# Patient Record
Sex: FEMALE | Race: WHITE | NOT HISPANIC OR LATINO | Employment: OTHER | URBAN - METROPOLITAN AREA
[De-identification: names, ages, dates, MRNs, and addresses within clinical notes are randomized per-mention and may not be internally consistent; named-entity substitution may affect disease eponyms.]

---

## 2017-01-19 PROBLEM — R06.02 SHORTNESS OF BREATH: Status: ACTIVE | Noted: 2017-01-19

## 2017-01-19 PROBLEM — Z85.3 HISTORY OF BREAST CANCER: Status: ACTIVE | Noted: 2017-01-19

## 2017-01-19 PROBLEM — I10 HYPERTENSION: Status: ACTIVE | Noted: 2017-01-19

## 2017-01-29 PROBLEM — I50.32 HEART FAILURE, DIASTOLIC, CHRONIC: Status: ACTIVE | Noted: 2017-01-29

## 2017-02-01 ENCOUNTER — HOSPITAL ENCOUNTER (OUTPATIENT)
Dept: RADIOLOGY | Facility: OTHER | Age: 62
Discharge: HOME | End: 2017-02-01
Attending: INTERNAL MEDICINE
Payer: COMMERCIAL

## 2017-02-01 DIAGNOSIS — I50.32 HEART FAILURE, DIASTOLIC, CHRONIC: ICD-10-CM

## 2017-02-01 PROCEDURE — 71020 XR CHEST PA AND LATERAL: CPT | Mod: 26,,, | Performed by: RADIOLOGY

## 2017-02-01 PROCEDURE — 71020 XR CHEST PA AND LATERAL: CPT | Mod: TC

## 2017-06-13 PROBLEM — E66.3 OVERWEIGHT: Status: ACTIVE | Noted: 2017-06-13

## 2017-11-30 ENCOUNTER — HOSPITAL ENCOUNTER (EMERGENCY)
Facility: OTHER | Age: 62
Discharge: HOME | End: 2017-11-30
Attending: EMERGENCY MEDICINE
Payer: COMMERCIAL

## 2017-11-30 VITALS
RESPIRATION RATE: 23 BRPM | HEART RATE: 58 BPM | TEMPERATURE: 94 F | WEIGHT: 145 LBS | SYSTOLIC BLOOD PRESSURE: 149 MMHG | HEIGHT: 62 IN | BODY MASS INDEX: 26.68 KG/M2 | OXYGEN SATURATION: 100 % | DIASTOLIC BLOOD PRESSURE: 74 MMHG

## 2017-11-30 DIAGNOSIS — I47.10 SVT (SUPRAVENTRICULAR TACHYCARDIA): Primary | ICD-10-CM

## 2017-11-30 DIAGNOSIS — I10 ESSENTIAL HYPERTENSION: ICD-10-CM

## 2017-11-30 LAB
ALBUMIN SERPL BCP-MCNC: 3.9 G/DL
ALP SERPL-CCNC: 102 U/L
ALT SERPL W/O P-5'-P-CCNC: 29 U/L
ANION GAP SERPL CALC-SCNC: 11 MMOL/L
AST SERPL-CCNC: 27 U/L
BASOPHILS # BLD AUTO: 0.03 K/UL
BASOPHILS NFR BLD: 0.2 %
BILIRUB SERPL-MCNC: 1 MG/DL
BNP SERPL-MCNC: 220 PG/ML
BUN SERPL-MCNC: 16 MG/DL
CALCIUM SERPL-MCNC: 9.9 MG/DL
CHLORIDE SERPL-SCNC: 105 MMOL/L
CO2 SERPL-SCNC: 26 MMOL/L
CREAT SERPL-MCNC: 0.7 MG/DL
DIFFERENTIAL METHOD: ABNORMAL
EOSINOPHIL # BLD AUTO: 0.1 K/UL
EOSINOPHIL NFR BLD: 1 %
ERYTHROCYTE [DISTWIDTH] IN BLOOD BY AUTOMATED COUNT: 13.3 %
EST. GFR  (AFRICAN AMERICAN): >60 ML/MIN/1.73 M^2
EST. GFR  (NON AFRICAN AMERICAN): >60 ML/MIN/1.73 M^2
GLUCOSE SERPL-MCNC: 107 MG/DL
HCT VFR BLD AUTO: 47.8 %
HGB BLD-MCNC: 16.3 G/DL
LYMPHOCYTES # BLD AUTO: 3.6 K/UL
LYMPHOCYTES NFR BLD: 28.4 %
MAGNESIUM SERPL-MCNC: 2 MG/DL
MCH RBC QN AUTO: 34.2 PG
MCHC RBC AUTO-ENTMCNC: 34.1 G/DL
MCV RBC AUTO: 100 FL
MONOCYTES # BLD AUTO: 1 K/UL
MONOCYTES NFR BLD: 7.6 %
NEUTROPHILS # BLD AUTO: 7.9 K/UL
NEUTROPHILS NFR BLD: 62.6 %
PLATELET # BLD AUTO: 288 K/UL
PMV BLD AUTO: 10.8 FL
POTASSIUM SERPL-SCNC: 3.8 MMOL/L
PROT SERPL-MCNC: 7.8 G/DL
RBC # BLD AUTO: 4.76 M/UL
SODIUM SERPL-SCNC: 142 MMOL/L
TSH SERPL DL<=0.005 MIU/L-ACNC: 2.1 UIU/ML
WBC # BLD AUTO: 12.69 K/UL

## 2017-11-30 PROCEDURE — 96374 THER/PROPH/DIAG INJ IV PUSH: CPT

## 2017-11-30 PROCEDURE — 85025 COMPLETE CBC W/AUTO DIFF WBC: CPT

## 2017-11-30 PROCEDURE — 83880 ASSAY OF NATRIURETIC PEPTIDE: CPT

## 2017-11-30 PROCEDURE — 84443 ASSAY THYROID STIM HORMONE: CPT

## 2017-11-30 PROCEDURE — 93005 ELECTROCARDIOGRAM TRACING: CPT

## 2017-11-30 PROCEDURE — 83735 ASSAY OF MAGNESIUM: CPT

## 2017-11-30 PROCEDURE — 80053 COMPREHEN METABOLIC PANEL: CPT

## 2017-11-30 PROCEDURE — 99284 EMERGENCY DEPT VISIT MOD MDM: CPT | Mod: 25

## 2017-11-30 PROCEDURE — 63600175 PHARM REV CODE 636 W HCPCS

## 2017-11-30 RX ORDER — ADENOSINE 3 MG/ML
INJECTION, SOLUTION INTRAVENOUS
Status: COMPLETED
Start: 2017-11-30 | End: 2017-11-30

## 2017-11-30 RX ORDER — ADENOSINE 3 MG/ML
6 INJECTION, SOLUTION INTRAVENOUS ONCE
Status: COMPLETED | OUTPATIENT
Start: 2017-11-30 | End: 2017-11-30

## 2017-11-30 RX ORDER — METOPROLOL SUCCINATE 25 MG/1
50 TABLET, EXTENDED RELEASE ORAL DAILY
Qty: 60 TABLET | Refills: 0 | Status: SHIPPED | OUTPATIENT
Start: 2017-11-30 | End: 2017-12-14 | Stop reason: SDUPTHER

## 2017-11-30 RX ADMIN — ADENOSINE 6 MG: 3 INJECTION, SOLUTION INTRAVENOUS at 11:11

## 2017-11-30 NOTE — ED NOTES
AAOx3. Pt has regular tachycardia @ rate of 165 on monitor, c/o sob , denies CP, respirations even and unlabored, lung sounds clear in all fields, no peripheral edema noted, hx of CHF. Pale complexion and diaphoretic upon arrival to ED. Abdomen is soft and non tender, ambulatory w/ steady gait

## 2017-11-30 NOTE — ED PROVIDER NOTES
"Encounter Date: 11/30/2017    SCRIBE #1 NOTE: I, Perry Griffin, am scribing for, and in the presence of, Dr. Aviles.       History     Chief Complaint   Patient presents with    Tachycardia     pt reports HR of 165 and feeling dizzy. + sob denies chest pain     10:53 AM  Patient is a 62 y.o. female with a history of CHF and breast cancer in remission who presents to the ED with complaint of sudden onset palpitations. She reports sudden onset of symptoms two hours ago while sitting at her desk working from home. She reports a "heart racing" sensation, which has been constant since onset. She reports associated shortness of breath and generalized weakness. She denies chest pain, dizziness, or leg swelling. She denies any unusual activity or caffeine intake prior to onset this morning, but does notes "I had a lot going on, I was busy." She reports experiencing a similar sensation in the past, but states "usually it doesn't last this long." She notes recent bronchitis, and states she has finished one week of antibiotic treatment,but denies use of breathing treatments. She reports compliance with metoprolol, losartan, and anastrozole. She reports past surgery for breast cancer, but denies any recent surgeries. She denies use of tobacco, alcohol, or illicit drugs.      The history is provided by the patient.     Review of patient's allergies indicates:  No Known Allergies  Past Medical History:   Diagnosis Date    Heart failure, diastolic, chronic 1/29/2017 1/23/2017: Echo: Normal left ventricular size and systolic function. Mild LVH. Moderate diastolic dysfunction. Severely dilated LA. Mild aortic valve sclerosis - 1.5 m/s. Mild AR.    History of breast cancer 1/19/2017 6/2015: Right lumpectomy. Radiation. No chemotherapy.    Shortness of breath 1/19/2017 7/2016: Began experience shortness of breath.     History reviewed. No pertinent surgical history.  History reviewed. No pertinent family " history.  Social History   Substance Use Topics    Smoking status: Never Smoker    Smokeless tobacco: Never Used    Alcohol use Not on file     Review of Systems   Constitutional: Negative for activity change, chills and fever.   HENT: Negative for congestion and sore throat.    Eyes: Negative for visual disturbance.   Respiratory: Positive for shortness of breath. Negative for cough.    Cardiovascular: Positive for palpitations. Negative for chest pain and leg swelling.   Gastrointestinal: Negative for abdominal pain, diarrhea and vomiting.   Genitourinary: Negative for decreased urine volume, dysuria and vaginal discharge.   Musculoskeletal: Negative for joint swelling, neck pain and neck stiffness.   Skin: Negative for rash and wound.   Neurological: Positive for weakness. Negative for numbness and headaches.   Psychiatric/Behavioral: Negative for confusion.       Physical Exam     Vitals:    11/30/17 1057 11/30/17 1106 11/30/17 1127 11/30/17 1158   BP: (!) 137/103 (!) 183/105 (!) 174/79 (!) 166/81   Pulse: (!) 166 (!) 160 78 66   Resp: (!) 26 (!) 24     Temp:       TempSrc:       SpO2: 100% 100% 100% 100%   Weight:       Height:        11/30/17 1228   BP: (!) 149/74   Pulse: (!) 58   Resp: (!) 23   Temp:    TempSrc:    SpO2: 100%   Weight:    Height:        Physical Exam    Nursing note and vitals reviewed.  Constitutional: She appears well-developed and well-nourished.   HENT:   Head: Normocephalic and atraumatic.   Eyes: Conjunctivae and EOM are normal. Pupils are equal, round, and reactive to light.   Neck: Normal range of motion. Neck supple.   Cardiovascular: Regular rhythm, normal heart sounds and intact distal pulses.   No murmur heard.  Tachycardic.   Pulmonary/Chest: Breath sounds normal. No respiratory distress.   Abdominal: Soft. Bowel sounds are normal. There is no tenderness.   Musculoskeletal: Normal range of motion. She exhibits no edema.   Neurological: She is alert and oriented to person,  place, and time. She has normal strength. No cranial nerve deficit or sensory deficit.   Skin: Skin is warm and dry. No rash noted.   Psychiatric: She has a normal mood and affect. Her behavior is normal.         ED Course   Critical Care  Date/Time: 11/30/2017 11:07 AM  Performed by: YASIR PAIGE  Authorized by: YASIR PAIGE   Direct patient critical care time: 16 minutes  Additional history critical care time: 3 minutes  Ordering / reviewing critical care time: 5 minutes  Documentation critical care time: 5 minutes  Consulting other physicians critical care time: 5 minutes  Total critical care time (exclusive of procedural time) : 34 minutes  Critical care time was exclusive of separately billable procedures and treating other patients.  Critical care was necessary to treat or prevent imminent or life-threatening deterioration of the following conditions: cardiac failure (SVT).  Critical care was time spent personally by me on the following activities: blood draw for specimens, development of treatment plan with patient or surrogate, discussions with consultants, interpretation of cardiac output measurements, evaluation of patient's response to treatment, examination of patient, obtaining history from patient or surrogate, ordering and performing treatments and interventions, ordering and review of laboratory studies, pulse oximetry, re-evaluation of patient's condition and review of old charts.        Labs Reviewed   CBC W/ AUTO DIFFERENTIAL - Abnormal; Notable for the following:        Result Value    Hemoglobin 16.3 (*)      (*)     MCH 34.2 (*)     Gran # 7.9 (*)     All other components within normal limits   B-TYPE NATRIURETIC PEPTIDE - Abnormal; Notable for the following:      (*)     All other components within normal limits   COMPREHENSIVE METABOLIC PANEL   TSH   MAGNESIUM     EKG Readings: (Independently Interpreted)   Initial EKG at 10:47 AM: SVT rate of 163 with ST depressions in  the inferior leads.  Repeat EKG at 11:07 AM: Normal sinus rhythm at a rate or 76. No STEMI. No abnormal T waves.          Medical Decision Making:   Independently Interpreted Test(s):   I have ordered and independently interpreted EKG Reading(s) - see prior notes  Clinical Tests:   Lab Tests: Ordered and Reviewed  Medical Tests: Ordered and Reviewed  ED Management:  11:07 AM - SVT resolved upon administration of adenosine. Patient is now in sinus rhythm at a rate of 70.  12:43 PM - Discussed with Dr. Alvarenga.  He states to increase metoprolol succinate to 50 mg daily and follow-up with him in a few weeks.    Emergent evaluation of 62-year-old female who presents with complaint of palpitations.  Vital signs reveal significant tachycardia.  Physical exam revealed tachycardia, no evidence of fluid overload or overt heart failure.  EKG shows SVT.  We attempted vagal maneuvers with a modified Valsalva without success.  She was then given 6 mg IV adenosine with conversion to normal sinus rhythm.  Patient's labs show mild elevation in BNP but no electrolyte disturbance or renal failure or profound anemia.  I discussed the case with her cardiologist and we will increase her metoprolol and she will follow-up with him.  She is discharged in improved condition and I'm comfortable with discharge home.            Scribe Attestation:   Scribe #1: I performed the above scribed service and the documentation accurately describes the services I performed. I attest to the accuracy of the note.    Attending Attestation:           Physician Attestation for Scribe:  Physician Attestation Statement for Scribe #1: I, Dr. Aviles, reviewed documentation, as scribed by Perry Griffin in my presence, and it is both accurate and complete.                 ED Course      Clinical Impression:     1. SVT (supraventricular tachycardia)    2. Essential hypertension                                 Katina Aviles MD  11/30/17 6540

## 2017-12-14 PROBLEM — I47.10 SUPRAVENTRICULAR TACHYCARDIA: Status: ACTIVE | Noted: 2017-12-14

## 2017-12-28 ENCOUNTER — CLINICAL SUPPORT (OUTPATIENT)
Dept: CARDIOLOGY | Facility: CLINIC | Age: 62
End: 2017-12-28
Payer: COMMERCIAL

## 2017-12-28 DIAGNOSIS — I47.10 SUPRAVENTRICULAR TACHYCARDIA: ICD-10-CM

## 2017-12-28 PROCEDURE — 93224 XTRNL ECG REC UP TO 48 HRS: CPT | Mod: S$GLB,,, | Performed by: INTERNAL MEDICINE

## 2018-02-06 ENCOUNTER — OFFICE VISIT (OUTPATIENT)
Dept: CARDIOLOGY | Facility: CLINIC | Age: 63
End: 2018-02-06
Attending: INTERNAL MEDICINE
Payer: COMMERCIAL

## 2018-02-06 VITALS
DIASTOLIC BLOOD PRESSURE: 62 MMHG | BODY MASS INDEX: 26.5 KG/M2 | SYSTOLIC BLOOD PRESSURE: 128 MMHG | WEIGHT: 144 LBS | HEIGHT: 62 IN | HEART RATE: 47 BPM

## 2018-02-06 DIAGNOSIS — I47.10 SUPRAVENTRICULAR TACHYCARDIA: ICD-10-CM

## 2018-02-06 DIAGNOSIS — I50.32 HEART FAILURE, DIASTOLIC, CHRONIC: ICD-10-CM

## 2018-02-06 DIAGNOSIS — I10 ESSENTIAL HYPERTENSION: ICD-10-CM

## 2018-02-06 DIAGNOSIS — E66.3 OVERWEIGHT: ICD-10-CM

## 2018-02-06 DIAGNOSIS — R06.02 SHORTNESS OF BREATH: ICD-10-CM

## 2018-02-06 DIAGNOSIS — Z85.3 HISTORY OF BREAST CANCER: ICD-10-CM

## 2018-02-06 PROCEDURE — 3008F BODY MASS INDEX DOCD: CPT | Mod: S$GLB,,, | Performed by: INTERNAL MEDICINE

## 2018-02-06 PROCEDURE — 99214 OFFICE O/P EST MOD 30 MIN: CPT | Mod: S$GLB,,, | Performed by: INTERNAL MEDICINE

## 2018-02-06 NOTE — PROGRESS NOTES
Subjective:     Stefany Dobbins is a 62 y.o. female with hypertension. She is overweight. In 7/2016 she began experience some exertional dyspnea. In about 2005 she was told she had a leaky valve. She was diagnosed with breast cancer in 6/2015 and had a lumpectomy followed by radiation. To evaluate her exertional dyspnea she had an Echocardiogram on 1/23/2017 which revealed normal left ventricular size and systolic function with mild LVH. There was moderate diastolic dysfunction with a severely dilated LA. In addition there was mild aortic valve sclerosis with a peak velocity of 1.5 m/s and mild AR. On 2/14/2017 she had a Stress ECG and was able to do 6:00 min without any CP or SOB. The ECG was negative. A CXR was normal on 2/1/2017 and a BNP level was 109. No exertional chest pain. It was felt likely that her moderate diastolic dysfunction was the cause for her exertional dyspnea. She has since begun losartan and is now breathing easier with exertion. She was on atenolol 25 mg Q24 but as that was on backorder it was changed to metoprolol 25 mg Q24. She presented with SVT on 11/30/2017 at a rate of 167 bpm. She converted to sinus with adenosine. Since then she has been taking metoprolol 25 mg Q12 that was later changed to 50 mg Q24. Occasional subtle palpitations. No weak spells. Feeling she is under a lot of stress at present. Feeling well overall.      Congestive Heart Failure   Presents for follow-up visit. Associated symptoms include palpitations and shortness of breath. Pertinent negatives include no abdominal pain, chest pain, chest pressure, claudication, edema, fatigue, muscle weakness, near-syncope, nocturia, paroxysmal nocturnal dyspnea or unexpected weight change. The symptoms have been stable.   Shortness of Breath   This is a chronic problem. The current episode started more than 1 month ago. The problem occurs every several days. The problem has been gradually improving. Pertinent negatives include no  abdominal pain, chest pain, claudication, coryza, ear pain, fever, headaches, hemoptysis, leg pain, leg swelling, neck pain, orthopnea, PND, rash, rhinorrhea, sore throat, sputum production, swollen glands, syncope, vomiting or wheezing. The treatment provided no relief.   Palpitations    This is a new problem. The current episode started more than 1 year ago. Associated symptoms include shortness of breath. Pertinent negatives include no anxiety, chest fullness, chest pain, coughing, dizziness, fever, irregular heartbeat, malaise/fatigue, nausea, near-syncope, numbness, syncope, vomiting or weakness.   Hypertension   This is a chronic problem. The current episode started more than 1 month ago. The problem is unchanged. The problem is controlled (usually 120-130/70-80 mmHg at home). Associated symptoms include palpitations and shortness of breath. Pertinent negatives include no anxiety, blurred vision, chest pain, headaches, malaise/fatigue, neck pain, orthopnea, peripheral edema, PND or sweats.   Hyperlipidemia   Associated symptoms include shortness of breath. Pertinent negatives include no chest pain, focal weakness, leg pain or myalgias.       Review of Systems   Constitution: Negative for chills, fatigue, fever, weakness, malaise/fatigue and unexpected weight change.   HENT: Negative for ear pain, nosebleeds, rhinorrhea and sore throat.    Eyes: Negative for blurred vision, double vision, vision loss in left eye and vision loss in right eye.   Cardiovascular: Positive for palpitations. Negative for chest pain, claudication, dyspnea on exertion, irregular heartbeat, leg swelling, near-syncope, orthopnea, paroxysmal nocturnal dyspnea and syncope.   Respiratory: Positive for shortness of breath. Negative for cough, hemoptysis, sputum production and wheezing.    Endocrine: Negative for cold intolerance and heat intolerance.   Hematologic/Lymphatic: Negative for bleeding problem. Does not bruise/bleed easily.  "  Skin: Negative for color change and rash.   Musculoskeletal: Negative for back pain, falls, muscle weakness, myalgias and neck pain.   Gastrointestinal: Negative for abdominal pain, heartburn, hematemesis, hematochezia, hemorrhoids, jaundice, melena, nausea and vomiting.   Genitourinary: Negative for dysuria, hematuria and nocturia.   Neurological: Negative for dizziness, focal weakness, headaches, light-headedness, loss of balance, numbness and vertigo.   Psychiatric/Behavioral: Negative for altered mental status, depression and memory loss. The patient is not nervous/anxious.    Allergic/Immunologic: Negative for hives and persistent infections.       Current Outpatient Prescriptions on File Prior to Visit   Medication Sig Dispense Refill    anastrozole (ARIMIDEX) 1 mg Tab TK 1 T PO QD  1    losartan (COZAAR) 100 MG tablet Take 1 tablet (100 mg total) by mouth once daily. 90 tablet 3    metoprolol succinate (TOPROL-XL) 25 MG 24 hr tablet Take 1 tablet (25 mg total) by mouth once daily. 90 tablet 3    ropinirole (REQUIP) 0.5 MG tablet TK 1 T PO QD HS  4     No current facility-administered medications on file prior to visit.        /62   Pulse (!) 47   Ht 5' 2" (1.575 m)   Wt 65.3 kg (144 lb)   BMI 26.34 kg/m²       Objective:     Physical Exam   Constitutional: She is oriented to person, place, and time. She appears well-developed and well-nourished.  Non-toxic appearance. No distress.   HENT:   Head: Normocephalic and atraumatic.   Nose: Nose normal.   Eyes: Right eye exhibits no discharge. Left eye exhibits no discharge. Right conjunctiva is not injected. Left conjunctiva is not injected. Right pupil is round. Left pupil is round. Pupils are equal.   Neck: Neck supple. No JVD present. Carotid bruit is not present. No thyromegaly present.   Cardiovascular: Regular rhythm, S1 normal and S2 normal.   No extrasystoles are present. Bradycardia present.  PMI is not displaced.  Exam reveals gallop and " S4. Exam reveals no S3.    Murmur heard.   Midsystolic murmur is present  at the upper right sternal border  High-pitched blowing decrescendo early diastolic murmur is present with a grade of 1/6  at the upper right sternal border radiating to the apex  Pulses:       Radial pulses are 2+ on the right side, and 2+ on the left side.        Femoral pulses are 2+ on the right side, and 2+ on the left side.       Dorsalis pedis pulses are 2+ on the right side, and 2+ on the left side.        Posterior tibial pulses are 2+ on the right side, and 2+ on the left side.   Pulmonary/Chest: Effort normal and breath sounds normal.   Abdominal: Soft. Normal appearance. There is no hepatosplenomegaly. There is no tenderness.   Musculoskeletal:        Right ankle: She exhibits no swelling, no ecchymosis and no deformity.        Left ankle: She exhibits no swelling, no ecchymosis and no deformity.   Lymphadenopathy:        Head (right side): No submandibular adenopathy present.        Head (left side): No submandibular adenopathy present.     She has no cervical adenopathy.   Neurological: She is alert and oriented to person, place, and time. She is not disoriented. No cranial nerve deficit or sensory deficit.   Skin: Skin is warm, dry and intact. No rash noted. She is not diaphoretic. No cyanosis. Nails show no clubbing.   Psychiatric: She has a normal mood and affect. Her speech is normal and behavior is normal. Judgment and thought content normal. Cognition and memory are normal.       Assessment:     1. Heart failure, diastolic, chronic    2. Shortness of breath    3. Supraventricular tachycardia    4. Essential hypertension    5. Overweight    6. History of breast cancer        Plan:     1. Heart Failure, Diastolic, Chronic   7/2016: Began experience shortness of breath.   1/23/2017: Echo: Normal left ventricular size and systolic function. Mild LVH. Moderate diastolic dysfunction. Severely dilated LA. Mild aortic valve  sclerosis - 1.5 m/s. Mild AR.   2017: Stress EC:00 min. No CP or SOB. ECG negative.    2017: CXR: Normal.    2017: .   Appears her moderate diastolic dysfunction is cause for her SOB.   On metoprolol 50 mg Q24 and losartan 100 mg Q24.   Less exertional dyspnea now.       2. Shortness of Breath   2016: Began experience shortness of breath.   2016: Tells me blood tests fine.   As above.    3. Supraventricular Tachycardia   2017:  bpm.   2017: SB 46 bpm.   Reduce metoprolol to 25 mg Q24.   2017: Holter: Sinus rhythm 60 (48-80) bpm. One 10 sec run of SVT at 150 bpm.   On metoprolol 25 mg Q24.    4. Hypertension   : Diagnosed.   On metoprolol 50 mg Q24 and losartan 100 mg Q24.   Keeping log at home.   Well controlled.    5. Overweight   2017: Weight 65 kg. BMI 26.   Encouraged to lose weight.     6. History of Breast Cancer   2015: Right lumpectomy. Radiation. No chemotherapy.     7. Primary Care   Dr. Andrés Paredes.    F/u 4 month.    Robin Rae M.D.

## 2018-06-12 ENCOUNTER — OFFICE VISIT (OUTPATIENT)
Dept: CARDIOLOGY | Facility: CLINIC | Age: 63
End: 2018-06-12
Attending: INTERNAL MEDICINE
Payer: COMMERCIAL

## 2018-06-12 VITALS
HEART RATE: 59 BPM | DIASTOLIC BLOOD PRESSURE: 62 MMHG | HEIGHT: 62 IN | BODY MASS INDEX: 26.5 KG/M2 | WEIGHT: 144 LBS | SYSTOLIC BLOOD PRESSURE: 121 MMHG

## 2018-06-12 DIAGNOSIS — R06.02 SHORTNESS OF BREATH: ICD-10-CM

## 2018-06-12 DIAGNOSIS — Z85.3 HISTORY OF BREAST CANCER: ICD-10-CM

## 2018-06-12 DIAGNOSIS — I47.10 SUPRAVENTRICULAR TACHYCARDIA: ICD-10-CM

## 2018-06-12 DIAGNOSIS — I10 ESSENTIAL HYPERTENSION: ICD-10-CM

## 2018-06-12 DIAGNOSIS — E66.3 OVERWEIGHT: ICD-10-CM

## 2018-06-12 DIAGNOSIS — I50.32 HEART FAILURE, DIASTOLIC, CHRONIC: ICD-10-CM

## 2018-06-12 PROCEDURE — 99214 OFFICE O/P EST MOD 30 MIN: CPT | Mod: S$GLB,,, | Performed by: INTERNAL MEDICINE

## 2018-06-12 PROCEDURE — 3078F DIAST BP <80 MM HG: CPT | Mod: CPTII,S$GLB,, | Performed by: INTERNAL MEDICINE

## 2018-06-12 PROCEDURE — 3074F SYST BP LT 130 MM HG: CPT | Mod: CPTII,S$GLB,, | Performed by: INTERNAL MEDICINE

## 2018-06-12 PROCEDURE — 3008F BODY MASS INDEX DOCD: CPT | Mod: CPTII,S$GLB,, | Performed by: INTERNAL MEDICINE

## 2018-06-12 RX ORDER — LOSARTAN POTASSIUM 100 MG/1
100 TABLET ORAL DAILY
Qty: 90 TABLET | Refills: 3 | Status: SHIPPED | OUTPATIENT
Start: 2018-06-12 | End: 2019-06-06 | Stop reason: SDUPTHER

## 2018-06-12 RX ORDER — CYCLOBENZAPRINE HCL 5 MG
5 TABLET ORAL 3 TIMES DAILY PRN
COMMUNITY
End: 2021-08-24

## 2018-06-12 RX ORDER — AZELASTINE 1 MG/ML
1 SPRAY, METERED NASAL 2 TIMES DAILY
COMMUNITY
End: 2021-08-24

## 2018-06-12 RX ORDER — METOPROLOL SUCCINATE 25 MG/1
25 TABLET, EXTENDED RELEASE ORAL DAILY
Qty: 90 TABLET | Refills: 3 | Status: SHIPPED | OUTPATIENT
Start: 2018-06-12 | End: 2018-12-11

## 2018-06-12 NOTE — PROGRESS NOTES
Subjective:     Stefany Dobbins is a 62 y.o. female with hypertension. She is overweight. In 7/2016 she began experience some exertional dyspnea. In about 2005 she was told she had a leaky valve. She was diagnosed with breast cancer in 6/2015 and had a lumpectomy followed by radiation. To evaluate her exertional dyspnea she had an Echocardiogram on 1/23/2017 which revealed normal left ventricular size and systolic function with mild LVH. There was moderate diastolic dysfunction with a severely dilated LA. In addition there was mild aortic valve sclerosis with a peak velocity of 1.5 m/s and mild AR. On 2/14/2017 she had a Stress ECG and was able to do 6:00 min without any CP or SOB. The ECG was negative. A CXR was normal on 2/1/2017 and a BNP level was 109. No exertional chest pain. It was felt likely that her moderate diastolic dysfunction was the cause for her exertional dyspnea. She has since begun losartan and is since breathing easier with exertion. She was on atenolol 25 mg Q24 but as that was on backorder it was changed to metoprolol 25 mg Q24. She presented with SVT on 11/30/2017 at a rate of 167 bpm. She converted to sinus with adenosine. Since then she has been taking metoprolol 25 mg Q12 that was later changed to 50 mg Q24 and then later reduced back to 25 mg Q24. Occasional subtle palpitations. No weak spells. Feeling she is under a lot of stress. No exertional chest pain. Feeling well overall.      Congestive Heart Failure   Presents for follow-up visit. Associated symptoms include palpitations and shortness of breath. Pertinent negatives include no abdominal pain, chest pain, chest pressure, claudication, edema, fatigue, muscle weakness, near-syncope, nocturia, paroxysmal nocturnal dyspnea or unexpected weight change. The symptoms have been stable.   Shortness of Breath   This is a chronic problem. The current episode started more than 1 year ago. The problem occurs every several days. The problem has  been gradually improving. Pertinent negatives include no abdominal pain, chest pain, claudication, coryza, ear pain, fever, headaches, hemoptysis, leg pain, leg swelling, neck pain, orthopnea, PND, rash, rhinorrhea, sore throat, sputum production, swollen glands, syncope, vomiting or wheezing. The treatment provided no relief.   Palpitations    This is a chronic problem. The current episode started more than 1 year ago. Associated symptoms include shortness of breath. Pertinent negatives include no anxiety, chest fullness, chest pain, coughing, diaphoresis, dizziness, fever, irregular heartbeat, malaise/fatigue, nausea, near-syncope, numbness, syncope, vomiting or weakness.   Hypertension   This is a chronic problem. The current episode started more than 1 year ago. The problem is unchanged. The problem is controlled (usually 120-130/70-80 mmHg at home). Associated symptoms include palpitations and shortness of breath. Pertinent negatives include no anxiety, blurred vision, chest pain, headaches, malaise/fatigue, neck pain, orthopnea, peripheral edema, PND or sweats. There is no history of chronic renal disease.   Hyperlipidemia   She has no history of chronic renal disease, diabetes, hypothyroidism or obesity. Associated symptoms include shortness of breath. Pertinent negatives include no chest pain, focal sensory loss, focal weakness, leg pain or myalgias.       Review of Systems   Constitution: Negative for chills, diaphoresis, fatigue, fever, weakness, malaise/fatigue and unexpected weight change.   HENT: Negative for ear pain, nosebleeds, rhinorrhea and sore throat.    Eyes: Negative for blurred vision, double vision, vision loss in left eye and vision loss in right eye.   Cardiovascular: Positive for palpitations. Negative for chest pain, claudication, dyspnea on exertion, irregular heartbeat, leg swelling, near-syncope, orthopnea, paroxysmal nocturnal dyspnea and syncope.   Respiratory: Positive for shortness  "of breath. Negative for cough, hemoptysis, sputum production and wheezing.    Endocrine: Negative for cold intolerance and heat intolerance.   Hematologic/Lymphatic: Negative for bleeding problem. Does not bruise/bleed easily.   Skin: Negative for color change and rash.   Musculoskeletal: Negative for back pain, falls, muscle weakness, myalgias and neck pain.   Gastrointestinal: Negative for abdominal pain, heartburn, hematemesis, hematochezia, hemorrhoids, jaundice, melena, nausea and vomiting.   Genitourinary: Negative for dysuria, hematuria and nocturia.   Neurological: Negative for dizziness, focal weakness, headaches, light-headedness, loss of balance, numbness and vertigo.   Psychiatric/Behavioral: Negative for altered mental status, depression and memory loss. The patient is not nervous/anxious.    Allergic/Immunologic: Negative for hives and persistent infections.       Current Outpatient Prescriptions on File Prior to Visit   Medication Sig Dispense Refill    anastrozole (ARIMIDEX) 1 mg Tab TK 1 T PO QD  1    losartan (COZAAR) 100 MG tablet Take 1 tablet (100 mg total) by mouth once daily. 90 tablet 3    metoprolol succinate (TOPROL-XL) 25 MG 24 hr tablet Take 1 tablet (25 mg total) by mouth once daily. 90 tablet 3    ropinirole (REQUIP) 0.5 MG tablet TK 1 T PO QD HS  4     No current facility-administered medications on file prior to visit.        /62   Pulse (!) 59   Ht 5' 2" (1.575 m)   Wt 65.3 kg (144 lb)   BMI 26.34 kg/m²       Objective:     Physical Exam   Constitutional: She is oriented to person, place, and time. She appears well-developed and well-nourished.  Non-toxic appearance. No distress.   HENT:   Head: Normocephalic and atraumatic.   Nose: Nose normal.   Eyes: Right eye exhibits no discharge. Left eye exhibits no discharge. Right conjunctiva is not injected. Left conjunctiva is not injected. Right pupil is round. Left pupil is round. Pupils are equal.   Neck: Neck supple. No " JVD present. Carotid bruit is not present. No thyromegaly present.   Scar from thyroidectomy.   Cardiovascular: Regular rhythm, S1 normal and S2 normal.   No extrasystoles are present. Bradycardia present.  PMI is not displaced.  Exam reveals gallop and S4. Exam reveals no S3.    Murmur heard.   Midsystolic murmur is present  at the upper right sternal border  High-pitched blowing decrescendo early diastolic murmur is present with a grade of 1/6  at the upper right sternal border radiating to the apex  Pulses:       Radial pulses are 2+ on the right side, and 2+ on the left side.        Femoral pulses are 2+ on the right side, and 2+ on the left side.       Dorsalis pedis pulses are 2+ on the right side, and 2+ on the left side.        Posterior tibial pulses are 2+ on the right side, and 2+ on the left side.   Pulmonary/Chest: Effort normal and breath sounds normal.   Abdominal: Soft. Normal appearance. There is no hepatosplenomegaly. There is no tenderness.   Musculoskeletal:        Right ankle: She exhibits no swelling, no ecchymosis and no deformity.        Left ankle: She exhibits no swelling, no ecchymosis and no deformity.   Lymphadenopathy:        Head (right side): No submandibular adenopathy present.        Head (left side): No submandibular adenopathy present.     She has no cervical adenopathy.   Neurological: She is alert and oriented to person, place, and time. She is not disoriented. No cranial nerve deficit or sensory deficit.   Skin: Skin is warm, dry and intact. No rash noted. She is not diaphoretic. Nails show no clubbing.   Psychiatric: She has a normal mood and affect. Her speech is normal and behavior is normal. Judgment and thought content normal. Cognition and memory are normal.       Assessment:     1. Heart failure, diastolic, chronic    2. Shortness of breath    3. Supraventricular tachycardia    4. Essential hypertension    5. Overweight    6. History of breast cancer        Plan:     1.  Heart Failure, Diastolic, Chronic   2016: Began experience shortness of breath.   2017: Echo: Normal left ventricular size and systolic function. Mild LVH. Moderate diastolic dysfunction. Severely dilated LA. Mild aortic valve sclerosis - 1.5 m/s. Mild AR.   2017: Stress EC:00 min. No CP or SOB. ECG negative.    2017: CXR: Normal.    2017: .   Appears her moderate diastolic dysfunction is cause for her SOB.   On metoprolol 25 mg Q24 and losartan 100 mg Q24.   Less exertional dyspnea.       2. Shortness of Breath   2016: Began experience shortness of breath.   2016: Told me blood tests fine.   As above.    3. Supraventricular Tachycardia   2017:  bpm.   2017: SB 46 bpm.   Reduce metoprolol to 25 mg Q24.   2017: Holter: Sinus rhythm 60 (48-80) bpm. One 10 sec run of SVT at 150 bpm.   On metoprolol 25 mg Q24.   No clinical recurrence.    4. Hypertension   : Diagnosed.   On metoprolol 25 mg Q24 and losartan 100 mg Q24.   Keeping log at home.   Well controlled.    5. Overweight   2017: Weight 65 kg. BMI 26.   Encouraged to lose weight.     6. History of Breast Cancer   2015: Right lumpectomy. Radiation. No chemotherapy.    7. History of Partial Thyroidectomy   : Partial thyroidectomy.     8 [facility]   Dr. Andrés Paredes.    F/u 6 months.    Robin Rae M.D.

## 2018-12-11 ENCOUNTER — OFFICE VISIT (OUTPATIENT)
Dept: CARDIOLOGY | Facility: CLINIC | Age: 63
End: 2018-12-11
Attending: INTERNAL MEDICINE
Payer: COMMERCIAL

## 2018-12-11 VITALS
BODY MASS INDEX: 26.5 KG/M2 | SYSTOLIC BLOOD PRESSURE: 131 MMHG | DIASTOLIC BLOOD PRESSURE: 78 MMHG | HEIGHT: 62 IN | HEART RATE: 55 BPM | WEIGHT: 144 LBS

## 2018-12-11 DIAGNOSIS — E66.3 OVERWEIGHT: ICD-10-CM

## 2018-12-11 DIAGNOSIS — R06.02 SHORTNESS OF BREATH: ICD-10-CM

## 2018-12-11 DIAGNOSIS — I47.10 SUPRAVENTRICULAR TACHYCARDIA: ICD-10-CM

## 2018-12-11 DIAGNOSIS — Z85.3 HISTORY OF BREAST CANCER: ICD-10-CM

## 2018-12-11 DIAGNOSIS — I50.32 HEART FAILURE, DIASTOLIC, CHRONIC: ICD-10-CM

## 2018-12-11 DIAGNOSIS — I10 ESSENTIAL HYPERTENSION: ICD-10-CM

## 2018-12-11 PROCEDURE — 3075F SYST BP GE 130 - 139MM HG: CPT | Mod: CPTII,S$GLB,, | Performed by: INTERNAL MEDICINE

## 2018-12-11 PROCEDURE — 3008F BODY MASS INDEX DOCD: CPT | Mod: CPTII,S$GLB,, | Performed by: INTERNAL MEDICINE

## 2018-12-11 PROCEDURE — 99214 OFFICE O/P EST MOD 30 MIN: CPT | Mod: S$GLB,,, | Performed by: INTERNAL MEDICINE

## 2018-12-11 PROCEDURE — 3078F DIAST BP <80 MM HG: CPT | Mod: CPTII,S$GLB,, | Performed by: INTERNAL MEDICINE

## 2018-12-11 RX ORDER — DILTIAZEM HYDROCHLORIDE 180 MG/1
180 CAPSULE, EXTENDED RELEASE ORAL DAILY
Qty: 30 CAPSULE | Refills: 11 | Status: SHIPPED | OUTPATIENT
Start: 2018-12-11 | End: 2019-09-26 | Stop reason: SDUPTHER

## 2018-12-11 NOTE — PROGRESS NOTES
Subjective:     Stefany Dobbins is a 63 y.o. female with hypertension. She is overweight. In 7/2016 she began experience some exertional dyspnea. In about 2005 she was told she had a leaky valve. She was diagnosed with breast cancer in 6/2015 and had a lumpectomy followed by radiation. To evaluate her exertional dyspnea she had an Echocardiogram on 1/23/2017 which revealed normal left ventricular size and systolic function with mild LVH. There was moderate diastolic dysfunction with a severely dilated LA. In addition there was mild aortic valve sclerosis with a peak velocity of 1.5 m/s and mild AR. On 2/14/2017 she had a Stress ECG and was able to do 6:00 min without any CP or SOB. The ECG was negative. A CXR was normal on 2/1/2017 and a BNP level was 109. No exertional chest pain. It was felt likely that her moderate diastolic dysfunction was the cause for her exertional dyspnea. She has since begun losartan and is since breathing easier with exertion. She was on atenolol 25 mg Q24 but as that was on backorder it was changed to metoprolol 25 mg Q24. She presented with SVT on 11/30/2017 at a rate of 167 bpm. She converted to sinus with adenosine. Since then she has been taking metoprolol 25 mg Q12 that was later changed to 50 mg Q24 and then later reduced back to 25 mg Q24. She had occasional subtle palpitations for years but in 2018 she has had two spells of palpitations that lasted for a few hours in in 7/2018 and one hour in 11/2018. In 7/2018 she was in an argument with her daughter but in 11/2018 she was resting when it occurred. No weak spells. Feeling she is under a lot of stress. No exertional chest pain. Feeling well overall.      Congestive Heart Failure   Presents for follow-up visit. Associated symptoms include palpitations and shortness of breath. Pertinent negatives include no abdominal pain, chest pain, chest pressure, claudication, edema, fatigue, muscle weakness, near-syncope, nocturia, paroxysmal  nocturnal dyspnea or unexpected weight change. The symptoms have been stable.   Shortness of Breath   This is a chronic problem. The current episode started more than 1 year ago. The problem occurs every several days. The problem has been gradually improving. Pertinent negatives include no abdominal pain, chest pain, claudication, coryza, ear pain, fever, headaches, hemoptysis, leg pain, leg swelling, neck pain, orthopnea, PND, rash, rhinorrhea, sore throat, sputum production, swollen glands, syncope, vomiting or wheezing. The treatment provided no relief.   Palpitations    This is a chronic problem. The current episode started more than 1 year ago. Associated symptoms include shortness of breath. Pertinent negatives include no anxiety, chest fullness, chest pain, coughing, diaphoresis, dizziness, fever, irregular heartbeat, malaise/fatigue, nausea, near-syncope, numbness, syncope, vomiting or weakness.   Hypertension   This is a chronic problem. The current episode started more than 1 year ago. The problem is unchanged. The problem is controlled (usually 120-130/70-80 mmHg at home). Associated symptoms include palpitations and shortness of breath. Pertinent negatives include no anxiety, blurred vision, chest pain, headaches, malaise/fatigue, neck pain, orthopnea, peripheral edema, PND or sweats. There is no history of chronic renal disease.   Hyperlipidemia   She has no history of chronic renal disease, diabetes, hypothyroidism or obesity. Associated symptoms include shortness of breath. Pertinent negatives include no chest pain, focal sensory loss, focal weakness, leg pain or myalgias.       Review of Systems   Constitution: Negative for chills, diaphoresis, fatigue, fever, weakness, malaise/fatigue and unexpected weight change.   HENT: Negative for ear pain, nosebleeds, rhinorrhea and sore throat.    Eyes: Negative for blurred vision, double vision, vision loss in left eye and vision loss in right eye.  "  Cardiovascular: Positive for palpitations. Negative for chest pain, claudication, dyspnea on exertion, irregular heartbeat, leg swelling, near-syncope, orthopnea, paroxysmal nocturnal dyspnea and syncope.   Respiratory: Positive for shortness of breath. Negative for cough, hemoptysis, sputum production and wheezing.    Endocrine: Negative for cold intolerance and heat intolerance.   Hematologic/Lymphatic: Negative for bleeding problem. Does not bruise/bleed easily.   Skin: Negative for color change and rash.   Musculoskeletal: Negative for back pain, falls, muscle weakness, myalgias and neck pain.   Gastrointestinal: Negative for abdominal pain, heartburn, hematemesis, hematochezia, hemorrhoids, jaundice, melena, nausea and vomiting.   Genitourinary: Negative for dysuria, hematuria and nocturia.   Neurological: Negative for dizziness, focal weakness, headaches, light-headedness, loss of balance, numbness and vertigo.   Psychiatric/Behavioral: Negative for altered mental status, depression and memory loss. The patient is not nervous/anxious.    Allergic/Immunologic: Negative for hives and persistent infections.       Current Outpatient Medications on File Prior to Visit   Medication Sig Dispense Refill    anastrozole (ARIMIDEX) 1 mg Tab TK 1 T PO QD  1    azelastine (ASTELIN) 137 mcg (0.1 %) nasal spray 1 spray by Nasal route 2 (two) times daily.      cyclobenzaprine (FLEXERIL) 5 MG tablet Take 5 mg by mouth 3 (three) times daily as needed for Muscle spasms.      losartan (COZAAR) 100 MG tablet Take 1 tablet (100 mg total) by mouth once daily. 90 tablet 3    metoprolol succinate (TOPROL-XL) 25 MG 24 hr tablet Take 1 tablet (25 mg total) by mouth once daily. 90 tablet 3    ropinirole (REQUIP) 0.5 MG tablet TK 1 T PO QD HS  4     No current facility-administered medications on file prior to visit.        /78   Pulse (!) 55   Ht 5' 2" (1.575 m)   Wt 65.3 kg (144 lb)   BMI 26.34 kg/m²       Objective:   "   Physical Exam   Constitutional: She is oriented to person, place, and time. She appears well-developed and well-nourished.  Non-toxic appearance. No distress.   HENT:   Head: Normocephalic and atraumatic.   Nose: Nose normal.   Eyes: Right eye exhibits no discharge. Left eye exhibits no discharge. Right conjunctiva is not injected. Left conjunctiva is not injected. Right pupil is round. Left pupil is round. Pupils are equal.   Neck: Neck supple. No JVD present. Carotid bruit is not present. No thyromegaly present.   Scar from thyroidectomy.   Cardiovascular: Regular rhythm, S1 normal and S2 normal.  No extrasystoles are present. Bradycardia present. PMI is not displaced. Exam reveals gallop and S4. Exam reveals no S3.   Murmur heard.   Midsystolic murmur is present at the upper right sternal border.  High-pitched blowing decrescendo early diastolic murmur is present with a grade of 1/6 at the upper right sternal border radiating to the apex.  Pulses:       Radial pulses are 2+ on the right side, and 2+ on the left side.        Femoral pulses are 2+ on the right side, and 2+ on the left side.       Dorsalis pedis pulses are 2+ on the right side, and 2+ on the left side.        Posterior tibial pulses are 2+ on the right side, and 2+ on the left side.   Pulmonary/Chest: Effort normal and breath sounds normal.   Abdominal: Soft. Normal appearance. There is no hepatosplenomegaly. There is no tenderness.   Musculoskeletal:        Right ankle: She exhibits no swelling, no ecchymosis and no deformity.        Left ankle: She exhibits no swelling, no ecchymosis and no deformity.   Lymphadenopathy:        Head (right side): No submandibular adenopathy present.        Head (left side): No submandibular adenopathy present.     She has no cervical adenopathy.   Neurological: She is alert and oriented to person, place, and time. She is not disoriented. No cranial nerve deficit or sensory deficit.   Skin: Skin is warm, dry and  intact. No rash noted. She is not diaphoretic. Nails show no clubbing.   Psychiatric: She has a normal mood and affect. Her speech is normal and behavior is normal. Judgment and thought content normal. Cognition and memory are normal.       Assessment:     1. Heart failure, diastolic, chronic    2. Shortness of breath    3. Supraventricular tachycardia    4. Essential hypertension    5. Overweight    6. History of breast cancer        Plan:     1. Heart Failure, Diastolic, Chronic   2016: Began experience shortness of breath.   2017: Echo: Normal left ventricular size and systolic function. Mild LVH. Moderate diastolic dysfunction. Severely dilated LA. Mild aortic valve sclerosis - 1.5 m/s. Mild AR.   2017: Stress EC:00 min. No CP or SOB. ECG negative.    2017: CXR: Normal.    2017: .   Appears her moderate diastolic dysfunction is cause for her SOB.   On metoprolol 25 mg Q24 and losartan 100 mg Q24.   Less exertional dyspnea.       2. Shortness of Breath   2016: Began experience shortness of breath.   2016: Told me blood tests fine.   As above.    3. Supraventricular Tachycardia   2017:  bpm.   2017: SB 46 bpm.   Reduce metoprolol to 25 mg Q24.   2017: Holter: Sinus rhythm 60 (48-80) bpm. One 10 sec run of SVT at 150 bpm.   On metoprolol 25 mg Q24.   2018: Symptomatic spell for a few hours.   2018: Symptomatic spell for one hour.   2018: Change metoprolol to diltiazem 180 mg Q24. Discussed EPS which is very reasonable especially if recurrence.     4. Hypertension   : Diagnosed.   On metoprolol 25 mg Q24 and losartan 100 mg Q24.   Keeping log at home.   Well controlled.   As above.    5. Overweight   2017: Weight 65 kg. BMI 26.   Encouraged to lose weight.     6. History of Breast Cancer   2015: Right lumpectomy. Radiation. No chemotherapy.    7. History of Partial Thyroidectomy   1973: Partial thyroidectomy.     8 [facility]     Andrés Paredes.    F/u 2 months.    Robin Rae M.D.

## 2019-06-06 DIAGNOSIS — I10 ESSENTIAL HYPERTENSION: ICD-10-CM

## 2019-06-06 RX ORDER — LOSARTAN POTASSIUM 100 MG/1
TABLET ORAL
Qty: 90 TABLET | Refills: 0 | Status: SHIPPED | OUTPATIENT
Start: 2019-06-06 | End: 2019-09-26 | Stop reason: SDUPTHER

## 2019-09-26 DIAGNOSIS — I10 ESSENTIAL HYPERTENSION: ICD-10-CM

## 2019-09-26 DIAGNOSIS — I47.10 SUPRAVENTRICULAR TACHYCARDIA: ICD-10-CM

## 2019-09-26 RX ORDER — DILTIAZEM HYDROCHLORIDE 180 MG/1
180 CAPSULE, EXTENDED RELEASE ORAL DAILY
Qty: 90 CAPSULE | Refills: 3 | Status: SHIPPED | OUTPATIENT
Start: 2019-09-26 | End: 2019-10-01 | Stop reason: SDUPTHER

## 2019-09-26 RX ORDER — LOSARTAN POTASSIUM 100 MG/1
100 TABLET ORAL DAILY
Qty: 90 TABLET | Refills: 3 | Status: SHIPPED | OUTPATIENT
Start: 2019-09-26 | End: 2019-10-01 | Stop reason: SDUPTHER

## 2019-10-01 DIAGNOSIS — I10 ESSENTIAL HYPERTENSION: ICD-10-CM

## 2019-10-01 DIAGNOSIS — I47.10 SUPRAVENTRICULAR TACHYCARDIA: ICD-10-CM

## 2019-10-01 RX ORDER — LOSARTAN POTASSIUM 100 MG/1
100 TABLET ORAL DAILY
Qty: 90 TABLET | Refills: 3 | Status: SHIPPED | OUTPATIENT
Start: 2019-10-01 | End: 2020-09-02

## 2019-10-01 RX ORDER — DILTIAZEM HYDROCHLORIDE 180 MG/1
180 CAPSULE, EXTENDED RELEASE ORAL DAILY
Qty: 90 CAPSULE | Refills: 3 | Status: SHIPPED | OUTPATIENT
Start: 2019-10-01 | End: 2020-03-24 | Stop reason: SDUPTHER

## 2020-02-01 ENCOUNTER — OFFICE VISIT (OUTPATIENT)
Dept: URGENT CARE | Facility: CLINIC | Age: 65
End: 2020-02-01
Payer: MEDICARE

## 2020-02-01 VITALS
HEIGHT: 62 IN | DIASTOLIC BLOOD PRESSURE: 80 MMHG | OXYGEN SATURATION: 100 % | TEMPERATURE: 98 F | RESPIRATION RATE: 18 BRPM | SYSTOLIC BLOOD PRESSURE: 172 MMHG | BODY MASS INDEX: 25.76 KG/M2 | HEART RATE: 68 BPM | WEIGHT: 140 LBS

## 2020-02-01 DIAGNOSIS — R09.89 CHEST CONGESTION: ICD-10-CM

## 2020-02-01 DIAGNOSIS — J02.9 SORE THROAT: Primary | ICD-10-CM

## 2020-02-01 LAB
CTP QC/QA: YES
MOLECULAR STREP A: NEGATIVE

## 2020-02-01 PROCEDURE — 99203 PR OFFICE/OUTPT VISIT, NEW, LEVL III, 30-44 MIN: ICD-10-PCS | Mod: S$GLB,,, | Performed by: NURSE PRACTITIONER

## 2020-02-01 PROCEDURE — 71046 XR CHEST PA AND LATERAL: ICD-10-PCS | Mod: S$GLB,,, | Performed by: RADIOLOGY

## 2020-02-01 PROCEDURE — 87651 STREP A DNA AMP PROBE: CPT | Mod: QW,S$GLB,, | Performed by: NURSE PRACTITIONER

## 2020-02-01 PROCEDURE — 99203 OFFICE O/P NEW LOW 30 MIN: CPT | Mod: S$GLB,,, | Performed by: NURSE PRACTITIONER

## 2020-02-01 PROCEDURE — 71046 X-RAY EXAM CHEST 2 VIEWS: CPT | Mod: S$GLB,,, | Performed by: RADIOLOGY

## 2020-02-01 PROCEDURE — 87651 POCT STREP A MOLECULAR: ICD-10-PCS | Mod: QW,S$GLB,, | Performed by: NURSE PRACTITIONER

## 2020-02-01 RX ORDER — AMOXICILLIN AND CLAVULANATE POTASSIUM 875; 125 MG/1; MG/1
1 TABLET, FILM COATED ORAL 2 TIMES DAILY
Qty: 28 TABLET | Refills: 0 | Status: SHIPPED | OUTPATIENT
Start: 2020-02-01 | End: 2020-02-11

## 2020-02-01 RX ORDER — LEVOCETIRIZINE DIHYDROCHLORIDE 5 MG/1
5 TABLET, FILM COATED ORAL NIGHTLY
Qty: 30 TABLET | Refills: 0 | Status: SHIPPED | OUTPATIENT
Start: 2020-02-01 | End: 2021-08-24

## 2020-02-01 RX ORDER — BENZONATATE 100 MG/1
200 CAPSULE ORAL 3 TIMES DAILY PRN
Qty: 30 CAPSULE | Refills: 1 | Status: SHIPPED | OUTPATIENT
Start: 2020-02-01 | End: 2021-01-31

## 2020-02-01 RX ORDER — PRAVASTATIN SODIUM 40 MG/1
40 TABLET ORAL DAILY
COMMUNITY
Start: 2019-12-01

## 2020-02-01 NOTE — PROGRESS NOTES
Subjective:       Patient ID: Stefany Dobbins is a 64 y.o. female.    Vitals:  vitals were not taken for this visit.     Chief Complaint: URI    Pt's grand child recently dx with pnumonia     URI    This is a new problem. The current episode started today. The problem has been gradually worsening. There has been no fever. Associated symptoms include congestion, coughing and a sore throat. Pertinent negatives include no ear pain, nausea, rash, sinus pain, vomiting or wheezing. She has tried nothing for the symptoms.       Constitution: Positive for chills. Negative for sweating, fatigue and fever.   HENT: Positive for congestion and sore throat. Negative for ear pain, sinus pain, sinus pressure and voice change.    Neck: Negative for painful lymph nodes.   Eyes: Negative for eye redness.   Respiratory: Positive for chest tightness and cough. Negative for sputum production, bloody sputum, COPD, shortness of breath, stridor, wheezing and asthma.    Gastrointestinal: Negative for nausea and vomiting.   Musculoskeletal: Negative for muscle ache.   Skin: Negative for rash.   Allergic/Immunologic: Negative for seasonal allergies and asthma.   Hematologic/Lymphatic: Negative for swollen lymph nodes.       Objective:      Physical Exam   Constitutional: She is oriented to person, place, and time. She appears well-developed and well-nourished. She is cooperative.  Non-toxic appearance. She does not have a sickly appearance. She does not appear ill. No distress.   HENT:   Head: Normocephalic and atraumatic.   Right Ear: Hearing, tympanic membrane, external ear and ear canal normal.   Left Ear: Hearing, tympanic membrane, external ear and ear canal normal.   Nose: Nose normal. No mucosal edema, rhinorrhea or nasal deformity. No epistaxis. Right sinus exhibits no maxillary sinus tenderness and no frontal sinus tenderness. Left sinus exhibits no maxillary sinus tenderness and no frontal sinus tenderness.   Mouth/Throat: Uvula is  midline, oropharynx is clear and moist and mucous membranes are normal. No trismus in the jaw. Normal dentition. No uvula swelling. No oropharyngeal exudate, posterior oropharyngeal edema or posterior oropharyngeal erythema.   Eyes: Conjunctivae and lids are normal. No scleral icterus.   Neck: Trachea normal, full passive range of motion without pain and phonation normal. Neck supple. No neck rigidity. No edema and no erythema present.   Cardiovascular: Normal rate, regular rhythm, normal heart sounds, intact distal pulses and normal pulses.   Pulmonary/Chest: Effort normal and breath sounds normal. No respiratory distress. She has no decreased breath sounds. She has no rhonchi.   Abdominal: Normal appearance.   Musculoskeletal: Normal range of motion. She exhibits no edema or deformity.   Neurological: She is alert and oriented to person, place, and time. She exhibits normal muscle tone. Coordination normal.   Skin: Skin is warm, dry, intact, not diaphoretic and not pale.   Psychiatric: She has a normal mood and affect. Her speech is normal and behavior is normal. Judgment and thought content normal. Cognition and memory are normal.   Nursing note and vitals reviewed.        Assessment:         URI    Plan:       Results for orders placed or performed in visit on 02/01/20   POCT Strep A, Molecular   Result Value Ref Range    Molecular Strep A, POC Negative Negative     Acceptable Yes           Chestxray:  Cardiac silhouette is normal in size.  Lungs are symmetrically expanded.  No evidence of focal consolidative process, pneumothorax, or significant effusion.  No acute osseous abnormality identified

## 2020-02-02 NOTE — PATIENT INSTRUCTIONS

## 2020-02-03 RX ORDER — LEVOCETIRIZINE DIHYDROCHLORIDE 5 MG/1
TABLET, FILM COATED ORAL
Qty: 90 TABLET | OUTPATIENT
Start: 2020-02-03

## 2020-03-24 DIAGNOSIS — I47.10 SUPRAVENTRICULAR TACHYCARDIA: ICD-10-CM

## 2020-03-24 RX ORDER — DILTIAZEM HYDROCHLORIDE 180 MG/1
180 CAPSULE, EXTENDED RELEASE ORAL DAILY
Qty: 90 CAPSULE | Refills: 3 | Status: SHIPPED | OUTPATIENT
Start: 2020-03-24 | End: 2020-11-04

## 2020-07-30 ENCOUNTER — TELEPHONE (OUTPATIENT)
Dept: CARDIOLOGY | Facility: CLINIC | Age: 65
End: 2020-07-30

## 2020-07-30 NOTE — TELEPHONE ENCOUNTER
Scheduled appointment with Dr. Norman    ----- Message from Néstor Kwong, Patient Care Assistant sent at 7/30/2020  3:38 PM CDT -----  Name of Who is Calling: KEVAN LOVETT [[id number]]    What is the request in detail:Requesting to switch over to Dr. Norman.  Please contact to further discuss and advise      Can the clinic reply by [facility]: No    What Number to Call Back if not in [facility]:   [phone]

## 2020-08-03 ENCOUNTER — OFFICE VISIT (OUTPATIENT)
Dept: CARDIOLOGY | Facility: CLINIC | Age: 65
End: 2020-08-03
Payer: MEDICARE

## 2020-08-03 VITALS
HEART RATE: 62 BPM | HEIGHT: 62 IN | DIASTOLIC BLOOD PRESSURE: 86 MMHG | BODY MASS INDEX: 25.15 KG/M2 | WEIGHT: 136.69 LBS | SYSTOLIC BLOOD PRESSURE: 144 MMHG

## 2020-08-03 DIAGNOSIS — I47.10 SUPRAVENTRICULAR TACHYCARDIA: Primary | ICD-10-CM

## 2020-08-03 DIAGNOSIS — I50.32 HEART FAILURE, DIASTOLIC, CHRONIC: ICD-10-CM

## 2020-08-03 PROCEDURE — 1101F PT FALLS ASSESS-DOCD LE1/YR: CPT | Mod: CPTII,S$GLB,, | Performed by: INTERNAL MEDICINE

## 2020-08-03 PROCEDURE — 99214 PR OFFICE/OUTPT VISIT, EST, LEVL IV, 30-39 MIN: ICD-10-PCS | Mod: 25,S$GLB,, | Performed by: INTERNAL MEDICINE

## 2020-08-03 PROCEDURE — 1101F PR PT FALLS ASSESS DOC 0-1 FALLS W/OUT INJ PAST YR: ICD-10-PCS | Mod: CPTII,S$GLB,, | Performed by: INTERNAL MEDICINE

## 2020-08-03 PROCEDURE — 93005 ELECTROCARDIOGRAM TRACING: CPT

## 2020-08-03 PROCEDURE — 99999 PR PBB SHADOW E&M-EST. PATIENT-LVL IV: CPT | Mod: PBBFAC,,, | Performed by: INTERNAL MEDICINE

## 2020-08-03 PROCEDURE — 3077F PR MOST RECENT SYSTOLIC BLOOD PRESSURE >= 140 MM HG: ICD-10-PCS | Mod: CPTII,S$GLB,, | Performed by: INTERNAL MEDICINE

## 2020-08-03 PROCEDURE — 93010 EKG 12-LEAD: ICD-10-PCS | Mod: S$GLB,,, | Performed by: INTERNAL MEDICINE

## 2020-08-03 PROCEDURE — 3079F PR MOST RECENT DIASTOLIC BLOOD PRESSURE 80-89 MM HG: ICD-10-PCS | Mod: CPTII,S$GLB,, | Performed by: INTERNAL MEDICINE

## 2020-08-03 PROCEDURE — 99214 OFFICE O/P EST MOD 30 MIN: CPT | Mod: 25,S$GLB,, | Performed by: INTERNAL MEDICINE

## 2020-08-03 PROCEDURE — 99999 PR PBB SHADOW E&M-EST. PATIENT-LVL IV: ICD-10-PCS | Mod: PBBFAC,,, | Performed by: INTERNAL MEDICINE

## 2020-08-03 PROCEDURE — 3008F BODY MASS INDEX DOCD: CPT | Mod: CPTII,S$GLB,, | Performed by: INTERNAL MEDICINE

## 2020-08-03 PROCEDURE — 93010 ELECTROCARDIOGRAM REPORT: CPT | Mod: S$GLB,,, | Performed by: INTERNAL MEDICINE

## 2020-08-03 PROCEDURE — 3077F SYST BP >= 140 MM HG: CPT | Mod: CPTII,S$GLB,, | Performed by: INTERNAL MEDICINE

## 2020-08-03 PROCEDURE — 3079F DIAST BP 80-89 MM HG: CPT | Mod: CPTII,S$GLB,, | Performed by: INTERNAL MEDICINE

## 2020-08-03 PROCEDURE — 3008F PR BODY MASS INDEX (BMI) DOCUMENTED: ICD-10-PCS | Mod: CPTII,S$GLB,, | Performed by: INTERNAL MEDICINE

## 2020-08-03 RX ORDER — MULTIVITAMIN
1 TABLET ORAL DAILY
COMMUNITY

## 2020-08-03 NOTE — PROGRESS NOTES
[facility]    Chief Complaint  Chief Complaint   Patient presents with    Palpitations       HPI:    Patient is 5 evaluation of palpitations.  In December 2017, she was diagnosed with SVT after presented to the [facility] Emergency Department.  I reviewed the electrocardiogram.  Appears to be a short RP tachycardia.  She was given a single dose of adenosine after vagal maneuvers did not work.  Sinus rhythm was restored.  She was treated with metoprolol which was then switched to diltiazem.  For some time now, she has been having palpitations at least once a month.  Most recent episode was this past Thursday.  She checks her.  She has noted to be around 170.  Other than feeling anxious, she has no associated chest discomfort, dyspnea, nausea, vomiting, diaphoresis, syncope, or presyncope.  She tries with vagal maneuvers without success.  The heart rate seems to the slowly decrease, not stop suddenly.    Medications  Current Outpatient Medications   Medication Sig Dispense Refill    anastrozole (ARIMIDEX) 1 mg Tab TK 1 T PO QD  1    azelastine (ASTELIN) 137 mcg (0.1 %) nasal spray 1 spray by Nasal route 2 (two) times daily.      Ca comb no.1/vit D3/B6/FA/B12 (VITAMIN D3, CALCIUM CIT-PHOS, ORAL) Take by mouth.      cyclobenzaprine (FLEXERIL) 5 MG tablet Take 5 mg by mouth 3 (three) times daily as needed for Muscle spasms.      diltiaZEM (DILACOR XR) 180 MG CDCR Take 1 capsule (180 mg total) by mouth once daily. 90 capsule 3    losartan (COZAAR) 100 MG tablet Take 1 tablet (100 mg total) by mouth once daily. 90 tablet 3    multivitamin (THERAGRAN) per tablet Take 1 tablet by mouth once daily.      pravastatin (PRAVACHOL) 40 MG tablet       benzonatate (TESSALON PERLES) 100 MG capsule Take 2 capsules (200 mg total) by mouth 3 (three) times daily as needed for Cough. 30 capsule 1    levocetirizine (XYZAL) 5 MG tablet Take 1 tablet (5 mg total) by mouth every evening. 30 tablet 0     No current  facility-administered medications for this visit.       Prior to Admission medications    Medication Sig Start Date End Date Taking? Authorizing Provider   anastrozole (ARIMIDEX) 1 mg Tab TK 1 T PO QD 11/2/16  Yes Historical Provider, MD   azelastine (ASTELIN) 137 mcg (0.1 %) nasal spray 1 spray by Nasal route 2 (two) times daily.   Yes Historical Provider, MD   Ca comb no.1/vit D3/B6/FA/B12 (VITAMIN D3, CALCIUM CIT-PHOS, ORAL) Take by mouth.   Yes Historical Provider, MD   cyclobenzaprine (FLEXERIL) 5 MG tablet Take 5 mg by mouth 3 (three) times daily as needed for Muscle spasms.   Yes Historical Provider, MD   diltiaZEM (DILACOR XR) 180 MG CDCR Take 1 capsule (180 mg total) by mouth once daily. 3/24/20 3/24/21 Yes Robin Rae MD   losartan (COZAAR) 100 MG tablet Take 1 tablet (100 mg total) by mouth once daily. 10/1/19  Yes Robin Rae MD   multivitamin (THERAGRAN) per tablet Take 1 tablet by mouth once daily.   Yes Historical Provider, MD   pravastatin (PRAVACHOL) 40 MG tablet  12/1/19  Yes Historical Provider, MD   benzonatate (TESSALON PERLES) 100 MG capsule Take 2 capsules (200 mg total) by mouth 3 (three) times daily as needed for Cough. 2/1/20 1/31/21  Nila Quinones NP   levocetirizine (XYZAL) 5 MG tablet Take 1 tablet (5 mg total) by mouth every evening. 2/1/20 1/31/21  Nila Quinones NP   ropinirole (REQUIP) 0.5 MG tablet TK 1 T PO QD HS 11/15/16 8/3/20  Historical Provider, MD       History  Past Medical History:   Diagnosis Date    Heart failure, diastolic, chronic 1/29/2017 1/23/2017: Echo: Normal left ventricular size and systolic function. Mild LVH. Moderate diastolic dysfunction. Severely dilated LA. Mild aortic valve sclerosis - 1.5 m/s. Mild AR.    History of breast cancer 1/19/2017 6/2015: Right lumpectomy. Radiation. No chemotherapy.    Shortness of breath 1/19/2017 7/2016: Began experience shortness of breath.     No past surgical history on file.  Social History      Socioeconomic History    Marital status:      Spouse name: Not on file    Number of children: Not on file    Years of education: Not on file    Highest education level: Not on file   Occupational History    Not on file   Social Needs    Financial resource strain: Not on file    Food insecurity     Worry: Not on file     Inability: Not on file    Transportation needs     Medical: Not on file     Non-medical: Not on file   Tobacco Use    Smoking status: Never Smoker    Smokeless tobacco: Never Used   Substance and Sexual Activity    Alcohol use: Not Currently    Drug use: Never    Sexual activity: Not on file   Lifestyle    Physical activity     Days per week: Not on file     Minutes per session: Not on file    Stress: Not on file   Relationships    Social connections     Talks on phone: Not on file     Gets together: Not on file     Attends [employer] service: Not on file     Active member of club or organization: Not on file     Attends meetings of clubs or organizations: Not on file     Relationship status: Not on file   Other Topics Concern    Not on file   Social History Narrative    Not on file       Allergies  Review of patient's allergies indicates:  No Known Allergies    Review of Systems   Review of Systems   Constitution: Negative for malaise/fatigue, weight gain and weight loss.   Eyes: Negative for visual disturbance.   Cardiovascular: Positive for palpitations. Negative for chest pain, claudication, cyanosis, dyspnea on exertion, irregular heartbeat, leg swelling, near-syncope, orthopnea, paroxysmal nocturnal dyspnea and syncope.   Respiratory: Negative for cough, hemoptysis, shortness of breath, sleep disturbances due to breathing and wheezing.    Hematologic/Lymphatic: Negative for bleeding problem. Does not bruise/bleed easily.   Skin: Negative for poor wound healing.   Musculoskeletal: Negative for muscle cramps and myalgias.   Gastrointestinal: Negative for abdominal  pain, anorexia, diarrhea, heartburn, hematemesis, hematochezia, melena, nausea and vomiting.   Genitourinary: Negative for hematuria and nocturia.   Neurological: Negative for excessive daytime sleepiness, dizziness, focal weakness, light-headedness and weakness.   Psychiatric/Behavioral: The patient is nervous/anxious.        Physical Exam  Vitals:    08/03/20 1521   BP: (!) 144/86   Pulse: 62     Wt Readings from Last 1 Encounters:   08/03/20 62 kg (136 lb 11 oz)     Physical Exam   Constitutional: She is oriented to person, place, and time. She is cooperative.  Non-toxic appearance. No distress.   HENT:   Head: Normocephalic and atraumatic.   Eyes: Conjunctivae are normal. No scleral icterus.   Neck: Neck supple. No hepatojugular reflux and no JVD present. Carotid bruit is not present. No tracheal deviation present. No thyromegaly present.   Cardiovascular: Normal rate, regular rhythm, S1 normal and S2 normal.  No extrasystoles are present. PMI is not displaced. Exam reveals no S3 and no S4.   No murmur heard.  Pulses:       Carotid pulses are 2+ on the right side and 2+ on the left side.       Radial pulses are 2+ on the right side and 2+ on the left side.        Dorsalis pedis pulses are 2+ on the right side and 2+ on the left side.        Posterior tibial pulses are 2+ on the right side and 2+ on the left side.   Pulmonary/Chest: No accessory muscle usage. No respiratory distress. She has no decreased breath sounds. She has no wheezes. She has no rhonchi. She has no rales.   Abdominal: Soft. Bowel sounds are normal. She exhibits no pulsatile liver, no abdominal bruit and no pulsatile midline mass. There is no splenomegaly or hepatomegaly. There is no abdominal tenderness.   Musculoskeletal:         General: No tenderness, deformity or edema.   Neurological: She is alert and oriented to person, place, and time. She has normal strength. No cranial nerve deficit or sensory deficit.   Skin: Skin is warm, dry and  intact. She is not diaphoretic. No cyanosis. No pallor. Nails show no clubbing.   Psychiatric: She has a normal mood and affect. Her speech is normal and behavior is normal.       Labs  No visits with results within 6 Month(s) from this visit.   Latest known visit with results is:   Office Visit on 02/01/2020   Component Date Value Ref Range Status    Molecular Strep A, POC 02/01/2020 Negative  Negative Final     Acceptable 02/01/2020 Yes   Final       Imaging  No results found.    Assessment  1. Supraventricular tachycardia  She has a history of a short RP tachycardia, likely AVNRT.  I am not convinced that her most recent symptoms are recurrence or at if she is having something else such as atrial fibrillation.  - IN OFFICE EKG 12-LEAD (to Muse)  - Echo Color Flow Doppler? Yes; Future  - Cardiac event monitor; Future  - CBC Without Differential; Future  - Comprehensive metabolic panel; Future  - TSH; Future    2. Heart failure, diastolic, chronic  Compensated  - IN OFFICE EKG 12-LEAD (to Muse)  - Echo Color Flow Doppler? Yes; Future  - CBC Without Differential; Future  - Comprehensive metabolic panel; Future  - TSH; Future      Plan and Discussion    Will get a 30 day monitor to see what her clinical tachycardia is.  Re-evaluate echocardiogram given left atrial enlargement in the past.  Check labs.    Follow Up  Follow up in about 6 weeks (around 9/14/2020).      Vargas Norman MD

## 2020-08-06 ENCOUNTER — CLINICAL SUPPORT (OUTPATIENT)
Dept: CARDIOLOGY | Facility: HOSPITAL | Age: 65
End: 2020-08-06
Attending: INTERNAL MEDICINE
Payer: MEDICARE

## 2020-08-06 DIAGNOSIS — I47.10 SUPRAVENTRICULAR TACHYCARDIA: ICD-10-CM

## 2020-08-06 PROCEDURE — 93271 ECG/MONITORING AND ANALYSIS: CPT

## 2020-08-06 PROCEDURE — 93272 CARDIAC EVENT MONITOR (CUPID ONLY): ICD-10-PCS | Mod: ,,, | Performed by: INTERNAL MEDICINE

## 2020-08-06 PROCEDURE — 93272 ECG/REVIEW INTERPRET ONLY: CPT | Mod: ,,, | Performed by: INTERNAL MEDICINE

## 2020-08-10 ENCOUNTER — HOSPITAL ENCOUNTER (OUTPATIENT)
Dept: CARDIOLOGY | Facility: OTHER | Age: 65
Discharge: HOME | End: 2020-08-10
Attending: INTERNAL MEDICINE
Payer: MEDICARE

## 2020-08-10 VITALS
HEART RATE: 62 BPM | HEIGHT: 62 IN | WEIGHT: 136 LBS | BODY MASS INDEX: 25.03 KG/M2 | DIASTOLIC BLOOD PRESSURE: 86 MMHG | SYSTOLIC BLOOD PRESSURE: 144 MMHG

## 2020-08-10 DIAGNOSIS — I50.32 HEART FAILURE, DIASTOLIC, CHRONIC: ICD-10-CM

## 2020-08-10 DIAGNOSIS — I47.10 SUPRAVENTRICULAR TACHYCARDIA: ICD-10-CM

## 2020-08-10 LAB
ASCENDING AORTA: 3.18 CM
AV INDEX (PROSTH): 0.88
AV MEAN GRADIENT: 8 MMHG
AV PEAK GRADIENT: 15 MMHG
AV REGURGITATION PRESSURE HALF TIME: 925 MS
AV VALVE AREA: 3.05 CM2
AV VELOCITY RATIO: 0.73
BSA FOR ECHO PROCEDURE: 1.64 M2
CV ECHO LV RWT: 0.29 CM
DOP CALC AO PEAK VEL: 1.91 M/S
DOP CALC AO VTI: 37.09 CM
DOP CALC LVOT AREA: 3.5 CM2
DOP CALC LVOT DIAMETER: 2.1 CM
DOP CALC LVOT PEAK VEL: 1.39 M/S
DOP CALC LVOT STROKE VOLUME: 113.17 CM3
DOP CALCLVOT PEAK VEL VTI: 32.69 CM
E WAVE DECELERATION TIME: 200.65 MSEC
E/A RATIO: 1.17
E/E' RATIO: 10.71 M/S
ECHO LV POSTERIOR WALL: 0.73 CM (ref 0.6–1.1)
FRACTIONAL SHORTENING: 38 % (ref 28–44)
INTERVENTRICULAR SEPTUM: 0.73 CM (ref 0.6–1.1)
IVRT: 125.69 MSEC
LA MAJOR: 4.98 CM
LA MINOR: 5.37 CM
LA WIDTH: 4.62 CM
LEFT ATRIUM SIZE: 3.47 CM
LEFT ATRIUM VOLUME INDEX MOD: 38.2 ML/M2
LEFT ATRIUM VOLUME INDEX: 43.4 ML/M2
LEFT ATRIUM VOLUME MOD: 62 CM3
LEFT ATRIUM VOLUME: 70.42 CM3
LEFT INTERNAL DIMENSION IN SYSTOLE: 3.14 CM (ref 2.1–4)
LEFT VENTRICLE DIASTOLIC VOLUME INDEX: 75.49 ML/M2
LEFT VENTRICLE DIASTOLIC VOLUME: 122.49 ML
LEFT VENTRICLE MASS INDEX: 77 G/M2
LEFT VENTRICLE SYSTOLIC VOLUME INDEX: 24 ML/M2
LEFT VENTRICLE SYSTOLIC VOLUME: 38.98 ML
LEFT VENTRICULAR INTERNAL DIMENSION IN DIASTOLE: 5.08 CM (ref 3.5–6)
LEFT VENTRICULAR MASS: 124.26 G
LV LATERAL E/E' RATIO: 9.38 M/S
LV SEPTAL E/E' RATIO: 12.5 M/S
MV PEAK A VEL: 0.64 M/S
MV PEAK E VEL: 0.75 M/S
MV STENOSIS PRESSURE HALF TIME: 58.19 MS
MV VALVE AREA P 1/2 METHOD: 3.78 CM2
PISA MRMAX VEL: 0.04 M/S
PISA TR MAX VEL: 2.4 M/S
PULM VEIN S/D RATIO: 1.48
PV PEAK D VEL: 0.44 M/S
PV PEAK S VEL: 0.65 M/S
PV PEAK VELOCITY: 1.04 CM/S
RA MAJOR: 4.7 CM
RA PRESSURE: 3 MMHG
RA WIDTH: 3.02 CM
RIGHT VENTRICULAR END-DIASTOLIC DIMENSION: 2.7 CM
RV TISSUE DOPPLER FREE WALL SYSTOLIC VELOCITY 1 (APICAL 4 CHAMBER VIEW): 16.24 CM/S
SINUS: 3.47 CM
STJ: 2.97 CM
TDI LATERAL: 0.08 M/S
TDI SEPTAL: 0.06 M/S
TDI: 0.07 M/S
TR MAX PG: 23 MMHG
TRICUSPID ANNULAR PLANE SYSTOLIC EXCURSION: 3.09 CM
TV REST PULMONARY ARTERY PRESSURE: 26 MMHG

## 2020-08-10 PROCEDURE — 93306 TTE W/DOPPLER COMPLETE: CPT | Mod: 26,,, | Performed by: INTERNAL MEDICINE

## 2020-08-10 PROCEDURE — 93306 TTE W/DOPPLER COMPLETE: CPT

## 2020-08-10 PROCEDURE — 93306 ECHO (CUPID ONLY): ICD-10-PCS | Mod: 26,,, | Performed by: INTERNAL MEDICINE

## 2020-09-14 ENCOUNTER — OFFICE VISIT (OUTPATIENT)
Dept: CARDIOLOGY | Facility: CLINIC | Age: 65
End: 2020-09-14
Payer: MEDICARE

## 2020-09-14 VITALS
HEART RATE: 65 BPM | BODY MASS INDEX: 24.75 KG/M2 | DIASTOLIC BLOOD PRESSURE: 74 MMHG | OXYGEN SATURATION: 97 % | HEIGHT: 62 IN | WEIGHT: 134.5 LBS | SYSTOLIC BLOOD PRESSURE: 130 MMHG

## 2020-09-14 DIAGNOSIS — I50.32 HEART FAILURE, DIASTOLIC, CHRONIC: ICD-10-CM

## 2020-09-14 DIAGNOSIS — I47.10 SUPRAVENTRICULAR TACHYCARDIA: Primary | ICD-10-CM

## 2020-09-14 PROBLEM — I50.30 DIASTOLIC HEART FAILURE: Status: ACTIVE | Noted: 2017-01-29

## 2020-09-14 PROBLEM — R06.02 SHORTNESS OF BREATH: Status: RESOLVED | Noted: 2017-01-19 | Resolved: 2020-09-14

## 2020-09-14 PROCEDURE — 1126F PR PAIN SEVERITY QUANTIFIED, NO PAIN PRESENT: ICD-10-PCS | Mod: S$GLB,,, | Performed by: INTERNAL MEDICINE

## 2020-09-14 PROCEDURE — 1159F PR MEDICATION LIST DOCUMENTED IN MEDICAL RECORD: ICD-10-PCS | Mod: S$GLB,,, | Performed by: INTERNAL MEDICINE

## 2020-09-14 PROCEDURE — 3075F SYST BP GE 130 - 139MM HG: CPT | Mod: CPTII,S$GLB,, | Performed by: INTERNAL MEDICINE

## 2020-09-14 PROCEDURE — 99214 PR OFFICE/OUTPT VISIT, EST, LEVL IV, 30-39 MIN: ICD-10-PCS | Mod: S$GLB,,, | Performed by: INTERNAL MEDICINE

## 2020-09-14 PROCEDURE — 1101F PR PT FALLS ASSESS DOC 0-1 FALLS W/OUT INJ PAST YR: ICD-10-PCS | Mod: CPTII,S$GLB,, | Performed by: INTERNAL MEDICINE

## 2020-09-14 PROCEDURE — 99999 PR PBB SHADOW E&M-EST. PATIENT-LVL IV: ICD-10-PCS | Mod: PBBFAC,,, | Performed by: INTERNAL MEDICINE

## 2020-09-14 PROCEDURE — 1159F MED LIST DOCD IN RCRD: CPT | Mod: S$GLB,,, | Performed by: INTERNAL MEDICINE

## 2020-09-14 PROCEDURE — 99999 PR PBB SHADOW E&M-EST. PATIENT-LVL IV: CPT | Mod: PBBFAC,,, | Performed by: INTERNAL MEDICINE

## 2020-09-14 PROCEDURE — 99214 OFFICE O/P EST MOD 30 MIN: CPT | Mod: S$GLB,,, | Performed by: INTERNAL MEDICINE

## 2020-09-14 PROCEDURE — 3078F DIAST BP <80 MM HG: CPT | Mod: CPTII,S$GLB,, | Performed by: INTERNAL MEDICINE

## 2020-09-14 PROCEDURE — 3008F BODY MASS INDEX DOCD: CPT | Mod: CPTII,S$GLB,, | Performed by: INTERNAL MEDICINE

## 2020-09-14 PROCEDURE — 3078F PR MOST RECENT DIASTOLIC BLOOD PRESSURE < 80 MM HG: ICD-10-PCS | Mod: CPTII,S$GLB,, | Performed by: INTERNAL MEDICINE

## 2020-09-14 PROCEDURE — 3008F PR BODY MASS INDEX (BMI) DOCUMENTED: ICD-10-PCS | Mod: CPTII,S$GLB,, | Performed by: INTERNAL MEDICINE

## 2020-09-14 PROCEDURE — 1101F PT FALLS ASSESS-DOCD LE1/YR: CPT | Mod: CPTII,S$GLB,, | Performed by: INTERNAL MEDICINE

## 2020-09-14 PROCEDURE — 1126F AMNT PAIN NOTED NONE PRSNT: CPT | Mod: S$GLB,,, | Performed by: INTERNAL MEDICINE

## 2020-09-14 PROCEDURE — 3075F PR MOST RECENT SYSTOLIC BLOOD PRESS GE 130-139MM HG: ICD-10-PCS | Mod: CPTII,S$GLB,, | Performed by: INTERNAL MEDICINE

## 2020-09-14 NOTE — PROGRESS NOTES
[facility]    Chief Complaint  Chief Complaint   Patient presents with    Palpitations       HPI:    Some trouble getting her event monitor.  So still in progress.  Had a couple of symptoms.  No reports yet.    Medications  Current Outpatient Medications   Medication Sig Dispense Refill    anastrozole (ARIMIDEX) 1 mg Tab TK 1 T PO QD  1    azelastine (ASTELIN) 137 mcg (0.1 %) nasal spray 1 spray by Nasal route 2 (two) times daily.      benzonatate (TESSALON PERLES) 100 MG capsule Take 2 capsules (200 mg total) by mouth 3 (three) times daily as needed for Cough. 30 capsule 1    Ca comb no.1/vit D3/B6/FA/B12 (VITAMIN D3, CALCIUM CIT-PHOS, ORAL) Take by mouth.      cyclobenzaprine (FLEXERIL) 5 MG tablet Take 5 mg by mouth 3 (three) times daily as needed for Muscle spasms.      diltiaZEM (DILACOR XR) 180 MG CDCR Take 1 capsule (180 mg total) by mouth once daily. 90 capsule 3    levocetirizine (XYZAL) 5 MG tablet Take 1 tablet (5 mg total) by mouth every evening. 30 tablet 0    losartan (COZAAR) 100 MG tablet TAKE 1 TABLET EVERY DAY 90 tablet 3    multivitamin (THERAGRAN) per tablet Take 1 tablet by mouth once daily.      pravastatin (PRAVACHOL) 40 MG tablet        No current facility-administered medications for this visit.       Prior to Admission medications    Medication Sig Start Date End Date Taking? Authorizing Provider   anastrozole (ARIMIDEX) 1 mg Tab TK 1 T PO QD 11/2/16   Historical Provider   azelastine (ASTELIN) 137 mcg (0.1 %) nasal spray 1 spray by Nasal route 2 (two) times daily.    Historical Provider   benzonatate (TESSALON PERLES) 100 MG capsule Take 2 capsules (200 mg total) by mouth 3 (three) times daily as needed for Cough. 2/1/20 1/31/21  Nila Quinones NP   Ca comb no.1/vit D3/B6/FA/B12 (VITAMIN D3, CALCIUM CIT-PHOS, ORAL) Take by mouth.    Historical Provider   cyclobenzaprine (FLEXERIL) 5 MG tablet Take 5 mg by mouth 3 (three) times daily as needed for Muscle  spasms.    Historical Provider   diltiaZEM (DILACOR XR) 180 MG CDCR Take 1 capsule (180 mg total) by mouth once daily. 3/24/20 3/24/21  Robin Rae MD   levocetirizine (XYZAL) 5 MG tablet Take 1 tablet (5 mg total) by mouth every evening. 2/1/20 1/31/21  Nila Quinones NP   losartan (COZAAR) 100 MG tablet TAKE 1 TABLET EVERY DAY 9/2/20   Robin Rae MD   multivitamin (THERAGRAN) per tablet Take 1 tablet by mouth once daily.    Historical Provider   pravastatin (PRAVACHOL) 40 MG tablet  12/1/19   Historical Provider       History  Past Medical History:   Diagnosis Date    Breast cancer 01/19/2017 6/2015: Right lumpectomy. Radiation. No chemotherapy.    Diastolic heart failure     Supraventricular tachycardia      Past Surgical History:   Procedure Laterality Date    BREAST LUMPECTOMY Right      Social History     Socioeconomic History    Marital status:      Spouse name: Not on file    Number of children: Not on file    Years of education: Not on file    Highest education level: Not on file   Occupational History    Not on file   Social Needs    Financial resource strain: Not on file    Food insecurity     Worry: Not on file     Inability: Not on file    Transportation needs     Medical: Not on file     Non-medical: Not on file   Tobacco Use    Smoking status: Never Smoker    Smokeless tobacco: Never Used   Substance and Sexual Activity    Alcohol use: Not Currently    Drug use: Never    Sexual activity: Not on file   Lifestyle    Physical activity     Days per week: Not on file     Minutes per session: Not on file    Stress: Not on file   Relationships    Social connections     Talks on phone: Not on file     Gets together: Not on file     Attends [employer] service: Not on file     Active member of club or organization: Not on file     Attends meetings of clubs or organizations: Not on file     Relationship status: Not on file   Other Topics Concern    Not on file   Social  History Narrative    Not on file       Allergies  Review of patient's allergies indicates:  No Known Allergies    Review of Systems   Review of Systems   Constitution: Negative for malaise/fatigue, weight gain and weight loss.   Eyes: Negative for visual disturbance.   Cardiovascular: Positive for palpitations. Negative for chest pain, claudication, cyanosis, dyspnea on exertion, irregular heartbeat, leg swelling, near-syncope, orthopnea, paroxysmal nocturnal dyspnea and syncope.   Respiratory: Negative for cough, hemoptysis, shortness of breath, sleep disturbances due to breathing and wheezing.    Hematologic/Lymphatic: Negative for bleeding problem. Does not bruise/bleed easily.   Skin: Negative for poor wound healing.   Musculoskeletal: Negative for muscle cramps and myalgias.   Gastrointestinal: Negative for abdominal pain, anorexia, diarrhea, heartburn, hematemesis, hematochezia, melena, nausea and vomiting.   Genitourinary: Negative for hematuria and nocturia.   Neurological: Negative for excessive daytime sleepiness, dizziness, focal weakness, light-headedness and weakness.   Psychiatric/Behavioral: The patient is nervous/anxious.        Physical Exam  Vitals:    09/14/20 1026   BP: 130/74   Pulse: 65     Wt Readings from Last 1 Encounters:   09/14/20 61 kg (134 lb 7.7 oz)     Physical Exam   Constitutional: She is oriented to person, place, and time. She is cooperative.  Non-toxic appearance. No distress.   HENT:   Head: Normocephalic and atraumatic.   Eyes: Conjunctivae are normal. No scleral icterus.   Neck: Neck supple. No hepatojugular reflux and no JVD present. Carotid bruit is not present. No tracheal deviation present. No thyromegaly present.   Cardiovascular: Normal rate, regular rhythm, S1 normal and S2 normal.  No extrasystoles are present. PMI is not displaced. Exam reveals no S3 and no S4.   No murmur heard.  Pulses:       Carotid pulses are 2+ on the right side and 2+ on the left side.        Radial pulses are 2+ on the right side and 2+ on the left side.        Dorsalis pedis pulses are 2+ on the right side and 2+ on the left side.        Posterior tibial pulses are 2+ on the right side and 2+ on the left side.   Pulmonary/Chest: No accessory muscle usage. No respiratory distress. She has no decreased breath sounds. She has no wheezes. She has no rhonchi. She has no rales.   Abdominal: Soft. Bowel sounds are normal. She exhibits no pulsatile liver, no abdominal bruit and no pulsatile midline mass. There is no splenomegaly or hepatomegaly. There is no abdominal tenderness.   Musculoskeletal:         General: No tenderness, deformity or edema.   Neurological: She is alert and oriented to person, place, and time. She has normal strength. No cranial nerve deficit or sensory deficit.   Skin: Skin is warm, dry and intact. She is not diaphoretic. No cyanosis. No pallor. Nails show no clubbing.   Psychiatric: She has a normal mood and affect. Her speech is normal and behavior is normal.       Labs  Hospital Outpatient Visit on 08/10/2020   Component Date Value Ref Range Status    BSA 08/10/2020 1.64  m2 Final    TDI SEPTAL 08/10/2020 0.06  m/s Final    LV LATERAL E/E' RATIO 08/10/2020 9.38  m/s Final    LV SEPTAL E/E' RATIO 08/10/2020 12.50  m/s Final    LA WIDTH 08/10/2020 4.62  cm Final    Right Atrial Pressure (from IVC) 08/10/2020 3  mmHg Final    TDI LATERAL 08/10/2020 0.08  m/s Final    PV PEAK VELOCITY 08/10/2020 1.04  cm/s Final    LVIDD 08/10/2020 5.08  3.5 - 6.0 cm Final    IVS 08/10/2020 0.73  0.6 - 1.1 cm Final    PW 08/10/2020 0.73  0.6 - 1.1 cm Final    LVIDS 08/10/2020 3.14  2.1 - 4.0 cm Final    FS 08/10/2020 38  28 - 44 % Final    LA volume 08/10/2020 70.42  cm3 Final    Sinus 08/10/2020 3.47  cm Final    STJ 08/10/2020 2.97  cm Final    Ascending aorta 08/10/2020 3.18  cm Final    LV mass 08/10/2020 124.26  g Final    LA size 08/10/2020 3.47  cm Final    RVDD 08/10/2020  2.70  cm Final    TAPSE 08/10/2020 3.09  cm Final    RV S' 08/10/2020 16.24  cm/s Final    Left Ventricle Relative Wall Thick* 08/10/2020 0.29  cm Final    AV regurgitation pressure 1/2 time 08/10/2020 925  ms Final    AV mean gradient 08/10/2020 8  mmHg Final    AV valve area 08/10/2020 3.05  cm2 Final    AV Velocity Ratio 08/10/2020 0.73   Final    AV index (prosthetic) 08/10/2020 0.88   Final    MV valve area p 1/2 method 08/10/2020 3.78  cm2 Final    E/A ratio 08/10/2020 1.17   Final    Mean e' 08/10/2020 0.07  m/s Final    E wave decelartion time 08/10/2020 200.65  msec Final    IVRT 08/10/2020 125.69  msec Final    Pulm vein S/D ratio 08/10/2020 1.48   Final    LVOT diameter 08/10/2020 2.10  cm Final    LVOT area 08/10/2020 3.5  cm2 Final    LVOT peak mark 08/10/2020 1.39  m/s Final    LVOT peak VTI 08/10/2020 32.69  cm Final    Ao peak mark 08/10/2020 1.91  m/s Final    Ao VTI 08/10/2020 37.09  cm Final    Mr max mark 08/10/2020 0.04  m/s Final    LVOT stroke volume 08/10/2020 113.17  cm3 Final    AV peak gradient 08/10/2020 15  mmHg Final    TV rest pulmonary artery pressure 08/10/2020 26  mmHg Final    E/E' ratio 08/10/2020 10.71  m/s Final    MV Peak E Mark 08/10/2020 0.75  m/s Final    TR Max Mark 08/10/2020 2.40  m/s Final    MV stenosis pressure 1/2 time 08/10/2020 58.19  ms Final    MV Peak A Mark 08/10/2020 0.64  m/s Final    PV Peak S Mark 08/10/2020 0.65  m/s Final    PV Peak D Mark 08/10/2020 0.44  m/s Final    LV Systolic Volume 08/10/2020 38.98  mL Final    LV Systolic Volume Index 08/10/2020 24.0  mL/m2 Final    LV Diastolic Volume 08/10/2020 122.49  mL Final    LV Diastolic Volume Index 08/10/2020 75.49  mL/m2 Final    LA Volume Index 08/10/2020 43.4  mL/m2 Final    LV Mass Index 08/10/2020 77  g/m2 Final    RA Major Axis 08/10/2020 4.70  cm Final    Left Atrium Minor Axis 08/10/2020 5.37  cm Final    Left Atrium Major Axis 08/10/2020 4.98  cm Final     Triscuspid Valve Regurgitation Pea* 08/10/2020 23  mmHg Final    LA Volume Index (Mod) 08/10/2020 38.2  mL/m2 Final    LA volume (mod) 08/10/2020 62.00  cm3 Final    RA Width 08/10/2020 3.02  cm Final   Lab Visit on 08/10/2020   Component Date Value Ref Range Status    WBC 08/10/2020 7.28  3.90 - 12.70 K/uL Final    RBC 08/10/2020 4.13  4.00 - 5.40 M/uL Final    Hemoglobin 08/10/2020 14.0  12.0 - 16.0 g/dL Final    Hematocrit 08/10/2020 42.2  37.0 - 48.5 % Final    Mean Corpuscular Volume 08/10/2020 102* 82 - 98 fL Final    Mean Corpuscular Hemoglobin 08/10/2020 33.9* 27.0 - 31.0 pg Final    Mean Corpuscular Hemoglobin Conc 08/10/2020 33.2  32.0 - 36.0 g/dL Final    RDW 08/10/2020 12.2  11.5 - 14.5 % Final    Platelets 08/10/2020 258  150 - 350 K/uL Final    MPV 08/10/2020 11.2  9.2 - 12.9 fL Final    Sodium 08/10/2020 141  136 - 145 mmol/L Final    Potassium 08/10/2020 4.0  3.5 - 5.1 mmol/L Final    Chloride 08/10/2020 105  95 - 110 mmol/L Final    CO2 08/10/2020 26  23 - 29 mmol/L Final    Glucose 08/10/2020 89  70 - 110 mg/dL Final    BUN, Bld 08/10/2020 17  8 - 23 mg/dL Final    Creatinine 08/10/2020 0.7  0.5 - 1.4 mg/dL Final    Calcium 08/10/2020 9.6  8.7 - 10.5 mg/dL Final    Total Protein 08/10/2020 6.9  6.0 - 8.4 g/dL Final    Albumin 08/10/2020 4.1  3.5 - 5.2 g/dL Final    Total Bilirubin 08/10/2020 0.7  0.1 - 1.0 mg/dL Final    Comment: For infants and newborns, interpretation of results should be based  on gestational age, weight and in agreement with clinical  observations.  Premature Infant recommended reference ranges:  Up to 24 hours.............<8.0 mg/dL  Up to 48 hours............<12.0 mg/dL  3-5 days..................<15.0 mg/dL  6-29 days.................<15.0 mg/dL      Alkaline Phosphatase 08/10/2020 104  55 - 135 U/L Final    AST 08/10/2020 25  10 - 40 U/L Final    ALT 08/10/2020 14  10 - 44 U/L Final    Anion Gap 08/10/2020 10  8 - 16 mmol/L Final    eGFR if   08/10/2020 >60  >60 mL/min/1.73 m^2 Final    eGFR if non African American 08/10/2020 >60  >60 mL/min/1.73 m^2 Final    Comment: Calculation used to obtain the estimated glomerular filtration  rate (eGFR) is the CKD-EPI equation.       TSH 08/10/2020 1.209  0.400 - 4.000 uIU/mL Final       Imaging  No results found.    Assessment  1. Supraventricular tachycardia  History of AVNRT.  Unclear if this is her current clinical arrhythmia.    2. Heart failure, diastolic, chronic  Compensated      Plan and Discussion    Await results of event monitor to decide about alteration in therapy.  Discussed vagal maneuvers.    Follow Up  Follow up in about 4 months (around 1/14/2021).      Vargas Nomran MD

## 2020-09-25 DIAGNOSIS — I48.0 PAROXYSMAL ATRIAL FIBRILLATION: Primary | ICD-10-CM

## 2020-10-06 ENCOUNTER — TELEPHONE (OUTPATIENT)
Dept: CARDIOLOGY | Facility: CLINIC | Age: 65
End: 2020-10-06

## 2020-10-19 ENCOUNTER — HOSPITAL ENCOUNTER (OUTPATIENT)
Dept: CARDIOLOGY | Facility: OTHER | Age: 65
Discharge: HOME | End: 2020-10-19
Attending: INTERNAL MEDICINE
Payer: MEDICARE

## 2020-10-19 VITALS
HEIGHT: 62 IN | WEIGHT: 134 LBS | HEART RATE: 71 BPM | SYSTOLIC BLOOD PRESSURE: 156 MMHG | DIASTOLIC BLOOD PRESSURE: 72 MMHG | BODY MASS INDEX: 24.66 KG/M2

## 2020-10-19 DIAGNOSIS — I48.0 PAROXYSMAL ATRIAL FIBRILLATION: ICD-10-CM

## 2020-10-19 LAB
CV STRESS BASE HR: 71 BPM
DIASTOLIC BLOOD PRESSURE: 72 MMHG
OHS CV CPX 85 PERCENT MAX PREDICTED HEART RATE MALE: 126
OHS CV CPX ESTIMATED METS: 8.4
OHS CV CPX MAX PREDICTED HEART RATE: 148
OHS CV CPX PATIENT IS FEMALE: 1
OHS CV CPX PATIENT IS MALE: 0
OHS CV CPX PEAK DIASTOLIC BLOOD PRESSURE: 79 MMHG
OHS CV CPX PEAK HEAR RATE: 136 BPM
OHS CV CPX PEAK RATE PRESSURE PRODUCT: NORMAL
OHS CV CPX PEAK SYSTOLIC BLOOD PRESSURE: 188 MMHG
OHS CV CPX PERCENT MAX PREDICTED HEART RATE ACHIEVED: 92
OHS CV CPX RATE PRESSURE PRODUCT PRESENTING: NORMAL
STRESS ECHO POST EXERCISE DUR MIN: 6 MINUTES
STRESS ECHO POST EXERCISE DUR SEC: 29 SECONDS
SYSTOLIC BLOOD PRESSURE: 156 MMHG

## 2020-10-19 PROCEDURE — 93016 CV STRESS TEST SUPVJ ONLY: CPT | Mod: ,,, | Performed by: INTERNAL MEDICINE

## 2020-10-19 PROCEDURE — 93017 CV STRESS TEST TRACING ONLY: CPT

## 2020-10-19 PROCEDURE — 93016 EXERCISE STRESS - EKG (CUPID ONLY): ICD-10-PCS | Mod: ,,, | Performed by: INTERNAL MEDICINE

## 2020-10-19 PROCEDURE — 93018 EXERCISE STRESS - EKG (CUPID ONLY): ICD-10-PCS | Mod: ,,, | Performed by: INTERNAL MEDICINE

## 2020-10-19 PROCEDURE — 93018 CV STRESS TEST I&R ONLY: CPT | Mod: ,,, | Performed by: INTERNAL MEDICINE

## 2020-10-22 ENCOUNTER — OFFICE VISIT (OUTPATIENT)
Dept: CARDIOLOGY | Facility: CLINIC | Age: 65
End: 2020-10-22
Payer: MEDICARE

## 2020-10-22 VITALS — OXYGEN SATURATION: 99 % | HEART RATE: 58 BPM | SYSTOLIC BLOOD PRESSURE: 132 MMHG | DIASTOLIC BLOOD PRESSURE: 86 MMHG

## 2020-10-22 DIAGNOSIS — I50.32 CHRONIC DIASTOLIC HEART FAILURE: ICD-10-CM

## 2020-10-22 DIAGNOSIS — I48.0 PAROXYSMAL ATRIAL FIBRILLATION: Primary | ICD-10-CM

## 2020-10-22 DIAGNOSIS — I10 ESSENTIAL HYPERTENSION: ICD-10-CM

## 2020-10-22 DIAGNOSIS — I47.10 SUPRAVENTRICULAR TACHYCARDIA: ICD-10-CM

## 2020-10-22 PROBLEM — E66.3 OVERWEIGHT: Status: RESOLVED | Noted: 2017-06-13 | Resolved: 2020-10-22

## 2020-10-22 PROCEDURE — 1101F PT FALLS ASSESS-DOCD LE1/YR: CPT | Mod: CPTII,S$GLB,, | Performed by: INTERNAL MEDICINE

## 2020-10-22 PROCEDURE — 1101F PR PT FALLS ASSESS DOC 0-1 FALLS W/OUT INJ PAST YR: ICD-10-PCS | Mod: CPTII,S$GLB,, | Performed by: INTERNAL MEDICINE

## 2020-10-22 PROCEDURE — 99214 OFFICE O/P EST MOD 30 MIN: CPT | Mod: S$GLB,,, | Performed by: INTERNAL MEDICINE

## 2020-10-22 PROCEDURE — 1159F PR MEDICATION LIST DOCUMENTED IN MEDICAL RECORD: ICD-10-PCS | Mod: S$GLB,,, | Performed by: INTERNAL MEDICINE

## 2020-10-22 PROCEDURE — 3079F DIAST BP 80-89 MM HG: CPT | Mod: CPTII,S$GLB,, | Performed by: INTERNAL MEDICINE

## 2020-10-22 PROCEDURE — 3079F PR MOST RECENT DIASTOLIC BLOOD PRESSURE 80-89 MM HG: ICD-10-PCS | Mod: CPTII,S$GLB,, | Performed by: INTERNAL MEDICINE

## 2020-10-22 PROCEDURE — 99214 PR OFFICE/OUTPT VISIT, EST, LEVL IV, 30-39 MIN: ICD-10-PCS | Mod: S$GLB,,, | Performed by: INTERNAL MEDICINE

## 2020-10-22 PROCEDURE — 3075F PR MOST RECENT SYSTOLIC BLOOD PRESS GE 130-139MM HG: ICD-10-PCS | Mod: CPTII,S$GLB,, | Performed by: INTERNAL MEDICINE

## 2020-10-22 PROCEDURE — 99999 PR PBB SHADOW E&M-EST. PATIENT-LVL III: ICD-10-PCS | Mod: PBBFAC,,, | Performed by: INTERNAL MEDICINE

## 2020-10-22 PROCEDURE — 3075F SYST BP GE 130 - 139MM HG: CPT | Mod: CPTII,S$GLB,, | Performed by: INTERNAL MEDICINE

## 2020-10-22 PROCEDURE — 99999 PR PBB SHADOW E&M-EST. PATIENT-LVL III: CPT | Mod: PBBFAC,,, | Performed by: INTERNAL MEDICINE

## 2020-10-22 PROCEDURE — 1159F MED LIST DOCD IN RCRD: CPT | Mod: S$GLB,,, | Performed by: INTERNAL MEDICINE

## 2020-10-22 RX ORDER — FLECAINIDE ACETATE 50 MG/1
50 TABLET ORAL EVERY 12 HOURS
Qty: 60 TABLET | Refills: 11 | Status: SHIPPED | OUTPATIENT
Start: 2020-10-22 | End: 2021-10-04

## 2020-10-22 NOTE — PROGRESS NOTES
[facility]    Chief Complaint  Chief Complaint   Patient presents with    Atrial Fibrillation       HPI:    Continues to have palpitations.  Using the [employer] tyra.  But, has not captured any arrhythmias.    Medications  Current Outpatient Medications   Medication Sig Dispense Refill    anastrozole (ARIMIDEX) 1 mg Tab TK 1 T PO QD  1    azelastine (ASTELIN) 137 mcg (0.1 %) nasal spray 1 spray by Nasal route 2 (two) times daily.      benzonatate (TESSALON PERLES) 100 MG capsule Take 2 capsules (200 mg total) by mouth 3 (three) times daily as needed for Cough. 30 capsule 1    Ca comb no.1/vit D3/B6/FA/B12 (VITAMIN D3, CALCIUM CIT-PHOS, ORAL) Take by mouth.      cyclobenzaprine (FLEXERIL) 5 MG tablet Take 5 mg by mouth 3 (three) times daily as needed for Muscle spasms.      diltiaZEM (DILACOR XR) 180 MG CDCR Take 1 capsule (180 mg total) by mouth once daily. 90 capsule 3    levocetirizine (XYZAL) 5 MG tablet Take 1 tablet (5 mg total) by mouth every evening. 30 tablet 0    losartan (COZAAR) 100 MG tablet TAKE 1 TABLET EVERY DAY 90 tablet 3    multivitamin (THERAGRAN) per tablet Take 1 tablet by mouth once daily.      pravastatin (PRAVACHOL) 40 MG tablet Take 40 mg by mouth once daily.       apixaban (ELIQUIS) 5 mg Tab Take 1 tablet (5 mg total) by mouth 2 (two) times daily. 180 tablet 3    flecainide (TAMBOCOR) 50 MG Tab Take 1 tablet (50 mg total) by mouth every 12 (twelve) hours. 60 tablet 11     No current facility-administered medications for this visit.         History  Past Medical History:   Diagnosis Date    Breast cancer 01/19/2017 6/2015: Right lumpectomy. Radiation. No chemotherapy.    Diastolic heart failure     Supraventricular tachycardia      Past Surgical History:   Procedure Laterality Date    BREAST LUMPECTOMY Right      Social History     Socioeconomic History    Marital status:      Spouse name: Not on file    Number of children: Not on file    Years of  education: Not on file    Highest education level: Not on file   Occupational History    Not on file   Social Needs    Financial resource strain: Not on file    Food insecurity     Worry: Not on file     Inability: Not on file    Transportation needs     Medical: Not on file     Non-medical: Not on file   Tobacco Use    Smoking status: Never Smoker    Smokeless tobacco: Never Used   Substance and Sexual Activity    Alcohol use: Not Currently    Drug use: Never    Sexual activity: Not on file   Lifestyle    Physical activity     Days per week: Not on file     Minutes per session: Not on file    Stress: Not on file   Relationships    Social connections     Talks on phone: Not on file     Gets together: Not on file     Attends Mu-ism service: Not on file     Active member of club or organization: Not on file     Attends meetings of clubs or organizations: Not on file     Relationship status: Not on file   Other Topics Concern    Not on file   Social History Narrative    Not on file     History reviewed. No pertinent family history.     Allergies  Review of patient's allergies indicates:  No Known Allergies    Review of Systems   Review of Systems   Constitution: Negative for malaise/fatigue, weight gain and weight loss.   Eyes: Negative for visual disturbance.   Cardiovascular: Positive for palpitations. Negative for chest pain, claudication, cyanosis, dyspnea on exertion, irregular heartbeat, leg swelling, near-syncope, orthopnea, paroxysmal nocturnal dyspnea and syncope.   Respiratory: Negative for cough, hemoptysis, shortness of breath, sleep disturbances due to breathing and wheezing.    Hematologic/Lymphatic: Negative for bleeding problem. Does not bruise/bleed easily.   Skin: Negative for poor wound healing.   Musculoskeletal: Negative for muscle cramps and myalgias.   Gastrointestinal: Negative for abdominal pain, anorexia, diarrhea, heartburn, hematemesis, hematochezia, melena, nausea and  vomiting.   Genitourinary: Negative for hematuria and nocturia.   Neurological: Negative for excessive daytime sleepiness, dizziness, focal weakness, light-headedness and weakness.   Psychiatric/Behavioral: The patient is nervous/anxious.        Physical Exam  Vitals:    10/22/20 1126   BP: 132/86   Pulse: (!) 58     Wt Readings from Last 1 Encounters:   10/19/20 60.8 kg (134 lb)     Physical Exam   Constitutional: She is oriented to person, place, and time. She is cooperative.  Non-toxic appearance. No distress.   HENT:   Head: Normocephalic and atraumatic.   Eyes: Conjunctivae are normal. No scleral icterus.   Neck: Neck supple. No hepatojugular reflux and no JVD present. Carotid bruit is not present. No tracheal deviation present. No thyromegaly present.   Cardiovascular: Normal rate, regular rhythm, S1 normal and S2 normal.  No extrasystoles are present. PMI is not displaced. Exam reveals no S3 and no S4.   No murmur heard.  Pulses:       Carotid pulses are 2+ on the right side and 2+ on the left side.       Radial pulses are 2+ on the right side and 2+ on the left side.        Dorsalis pedis pulses are 2+ on the right side and 2+ on the left side.        Posterior tibial pulses are 2+ on the right side and 2+ on the left side.   Pulmonary/Chest: No accessory muscle usage. No respiratory distress. She has no decreased breath sounds. She has no wheezes. She has no rhonchi. She has no rales.   Abdominal: Soft. Bowel sounds are normal. She exhibits no pulsatile liver, no abdominal bruit and no pulsatile midline mass. There is no splenomegaly or hepatomegaly. There is no abdominal tenderness.   Musculoskeletal:         General: No tenderness, deformity or edema.   Neurological: She is alert and oriented to person, place, and time. She has normal strength. No cranial nerve deficit or sensory deficit.   Skin: Skin is warm, dry and intact. She is not diaphoretic. No cyanosis. No pallor. Nails show no clubbing.    Psychiatric: She has a normal mood and affect. Her speech is normal and behavior is normal.       Labs  Hospital Outpatient Visit on 10/19/2020   Component Date Value Ref Range Status    Systolic blood pressure 10/19/2020 156.0  mmHg Final    Diastolic blood pressure 10/19/2020 72.0  mmHg Final    HR at rest 10/19/2020 71.0  bpm Final    85% Max Predicted HR 10/19/2020 126   Final    RPP 10/19/2020 11,076   Final    Max Predicted HR 10/19/2020 148   Final    OHS CV CPX PATIENT IS MALE 10/19/2020 0   Final    [facility] CV CPX PATIENT IS FEMALE 10/19/2020 1   Final    Exercise duration (min) 10/19/2020 6  minutes Final    Exercise duration (sec) 10/19/2020 29  seconds Final    Peak Systolic BP 10/19/2020 188.0  mmHg Final    Peak Diatolic BP 10/19/2020 79.0  mmHg Final    Peak HR 10/19/2020 136.0  bpm Final    Estimated METs 10/19/2020 8.4   Final    % Max HR Achieved 10/19/2020 92   Final    Peak RPP 10/19/2020 25,568   Final   Hospital Outpatient Visit on 08/10/2020   Component Date Value Ref Range Status    BSA 08/10/2020 1.64  m2 Final    TDI SEPTAL 08/10/2020 0.06  m/s Final    LV LATERAL E/E' RATIO 08/10/2020 9.38  m/s Final    LV SEPTAL E/E' RATIO 08/10/2020 12.50  m/s Final    LA WIDTH 08/10/2020 4.62  cm Final    Right Atrial Pressure (from IVC) 08/10/2020 3  mmHg Final    TDI LATERAL 08/10/2020 0.08  m/s Final    PV PEAK VELOCITY 08/10/2020 1.04  cm/s Final    LVIDd 08/10/2020 5.08  3.5 - 6.0 cm Final    IVS 08/10/2020 0.73  0.6 - 1.1 cm Final    Posterior Wall 08/10/2020 0.73  0.6 - 1.1 cm Final    LVIDs 08/10/2020 3.14  2.1 - 4.0 cm Final    FS 08/10/2020 38  28 - 44 % Final    LA volume 08/10/2020 70.42  cm3 Final    Sinus 08/10/2020 3.47  cm Final    STJ 08/10/2020 2.97  cm Final    Ascending aorta 08/10/2020 3.18  cm Final    LV mass 08/10/2020 124.26  g Final    LA size 08/10/2020 3.47  cm Final    RVDD 08/10/2020 2.70  cm Final    TAPSE 08/10/2020 3.09  cm Final    RV  S' 08/10/2020 16.24  cm/s Final    Left Ventricle Relative Wall Thick* 08/10/2020 0.29  cm Final    AV regurgitation pressure 1/2 time 08/10/2020 925  ms Final    AV mean gradient 08/10/2020 8  mmHg Final    AV valve area 08/10/2020 3.05  cm2 Final    AV Velocity Ratio 08/10/2020 0.73   Final    AV index (prosthetic) 08/10/2020 0.88   Final    MV valve area p 1/2 method 08/10/2020 3.78  cm2 Final    E/A ratio 08/10/2020 1.17   Final    Mean e' 08/10/2020 0.07  m/s Final    E wave decelartion time 08/10/2020 200.65  msec Final    IVRT 08/10/2020 125.69  msec Final    Pulm vein S/D ratio 08/10/2020 1.48   Final    LVOT diameter 08/10/2020 2.10  cm Final    LVOT area 08/10/2020 3.5  cm2 Final    LVOT peak mark 08/10/2020 1.39  m/s Final    LVOT peak VTI 08/10/2020 32.69  cm Final    Ao peak mark 08/10/2020 1.91  m/s Final    Ao VTI 08/10/2020 37.09  cm Final    Mr max mark 08/10/2020 0.04  m/s Final    LVOT stroke volume 08/10/2020 113.17  cm3 Final    AV peak gradient 08/10/2020 15  mmHg Final    TV rest pulmonary artery pressure 08/10/2020 26  mmHg Final    E/E' ratio 08/10/2020 10.71  m/s Final    MV Peak E Mark 08/10/2020 0.75  m/s Final    TR Max Mark 08/10/2020 2.40  m/s Final    MV stenosis pressure 1/2 time 08/10/2020 58.19  ms Final    MV Peak A Mark 08/10/2020 0.64  m/s Final    PV Peak S Mark 08/10/2020 0.65  m/s Final    PV Peak D Mark 08/10/2020 0.44  m/s Final    LV Systolic Volume 08/10/2020 38.98  mL Final    LV Systolic Volume Index 08/10/2020 24.0  mL/m2 Final    LV Diastolic Volume 08/10/2020 122.49  mL Final    LV Diastolic Volume Index 08/10/2020 75.49  mL/m2 Final    LA Volume Index 08/10/2020 43.4  mL/m2 Final    LV Mass Index 08/10/2020 77  g/m2 Final    RA Major Axis 08/10/2020 4.70  cm Final    Left Atrium Minor Axis 08/10/2020 5.37  cm Final    Left Atrium Major Axis 08/10/2020 4.98  cm Final    Triscuspid Valve Regurgitation Pea* 08/10/2020 23  mmHg Final     LA Volume Index (Mod) 08/10/2020 38.2  mL/m2 Final    LA volume (mod) 08/10/2020 62.00  cm3 Final    RA Width 08/10/2020 3.02  cm Final   Lab Visit on 08/10/2020   Component Date Value Ref Range Status    WBC 08/10/2020 7.28  3.90 - 12.70 K/uL Final    RBC 08/10/2020 4.13  4.00 - 5.40 M/uL Final    Hemoglobin 08/10/2020 14.0  12.0 - 16.0 g/dL Final    Hematocrit 08/10/2020 42.2  37.0 - 48.5 % Final    Mean Corpuscular Volume 08/10/2020 102* 82 - 98 fL Final    Mean Corpuscular Hemoglobin 08/10/2020 33.9* 27.0 - 31.0 pg Final    Mean Corpuscular Hemoglobin Conc 08/10/2020 33.2  32.0 - 36.0 g/dL Final    RDW 08/10/2020 12.2  11.5 - 14.5 % Final    Platelets 08/10/2020 258  150 - 350 K/uL Final    MPV 08/10/2020 11.2  9.2 - 12.9 fL Final    Sodium 08/10/2020 141  136 - 145 mmol/L Final    Potassium 08/10/2020 4.0  3.5 - 5.1 mmol/L Final    Chloride 08/10/2020 105  95 - 110 mmol/L Final    CO2 08/10/2020 26  23 - 29 mmol/L Final    Glucose 08/10/2020 89  70 - 110 mg/dL Final    BUN, Bld 08/10/2020 17  8 - 23 mg/dL Final    Creatinine 08/10/2020 0.7  0.5 - 1.4 mg/dL Final    Calcium 08/10/2020 9.6  8.7 - 10.5 mg/dL Final    Total Protein 08/10/2020 6.9  6.0 - 8.4 g/dL Final    Albumin 08/10/2020 4.1  3.5 - 5.2 g/dL Final    Total Bilirubin 08/10/2020 0.7  0.1 - 1.0 mg/dL Final    Comment: For infants and newborns, interpretation of results should be based  on gestational age, weight and in agreement with clinical  observations.  Premature Infant recommended reference ranges:  Up to 24 hours.............<8.0 mg/dL  Up to 48 hours............<12.0 mg/dL  3-5 days..................<15.0 mg/dL  6-29 days.................<15.0 mg/dL      Alkaline Phosphatase 08/10/2020 104  55 - 135 U/L Final    AST 08/10/2020 25  10 - 40 U/L Final    ALT 08/10/2020 14  10 - 44 U/L Final    Anion Gap 08/10/2020 10  8 - 16 mmol/L Final    eGFR if African American 08/10/2020 >60  >60 mL/min/1.73 m^2 Final    eGFR if  non  08/10/2020 >60  >60 mL/min/1.73 m^2 Final    Comment: Calculation used to obtain the estimated glomerular filtration  rate (eGFR) is the CKD-EPI equation.       TSH 08/10/2020 1.209  0.400 - 4.000 uIU/mL Final       Imaging  No results found.    Assessment  1. Paroxysmal atrial fibrillation  Has symptomatic, rapid, paroxysmal atrial fibrillation.  Will need rhythm control for her symptoms.  - flecainide (TAMBOCOR) 50 MG Tab; Take 1 tablet (50 mg total) by mouth every 12 (twelve) hours.  Dispense: 60 tablet; Refill: 11  - apixaban (ELIQUIS) 5 mg Tab; Take 1 tablet (5 mg total) by mouth 2 (two) times daily.  Dispense: 180 tablet; Refill: 3  - EKG 12-lead; Future  - CBC auto differential; Future  - Basic Metabolic Panel; Future  - Magnesium; Future    2. Supraventricular tachycardia  No evidence that this is playing any role in her symptoms    3. Chronic diastolic heart failure  Compensated    4. Essential hypertension  Controlled      Plan and Discussion    Will start anticoagulation and flecainide for rhythm control.  Discussed risks, benefits, and alternatives to each agent.     Follow Up  Follow up in about 6 weeks (around 12/3/2020).      Vargas Norman MD

## 2020-11-04 DIAGNOSIS — I47.10 SUPRAVENTRICULAR TACHYCARDIA: ICD-10-CM

## 2020-11-04 NOTE — TELEPHONE ENCOUNTER
----- Message from Radha Prater sent at 2020 11:35 AM CST -----  Regarding: new prescription  Can the clinic reply in [facility]: no Please refill the medication(s) listed below. Please call the patient when the prescription(s) is ready for  at this phone number [phone]. Patient stating the insurance states that its  and need a new prescription since she changed doctors.Patient stated the Dr. Robin Rae was her former doctor who filled it last. Medication #1 diltiaZEM (DILACOR XR) 180 MG CDCR Medication #2  Preferred Pharmacy: [facility] - [facility] [id number]

## 2020-11-05 RX ORDER — DILTIAZEM HYDROCHLORIDE 180 MG/1
180 CAPSULE, EXTENDED RELEASE ORAL DAILY
Qty: 90 CAPSULE | Refills: 3 | Status: SHIPPED | OUTPATIENT
Start: 2020-11-05 | End: 2021-08-26

## 2020-11-10 ENCOUNTER — HOSPITAL ENCOUNTER (OUTPATIENT)
Dept: CARDIOLOGY | Facility: OTHER | Age: 65
Discharge: HOME | End: 2020-11-10
Attending: INTERNAL MEDICINE
Payer: MEDICARE

## 2020-11-10 DIAGNOSIS — I48.0 PAROXYSMAL ATRIAL FIBRILLATION: ICD-10-CM

## 2020-11-10 PROCEDURE — 93005 ELECTROCARDIOGRAM TRACING: CPT

## 2020-11-10 PROCEDURE — 93010 EKG 12-LEAD: ICD-10-PCS | Mod: ,,, | Performed by: INTERNAL MEDICINE

## 2020-11-10 PROCEDURE — 93010 ELECTROCARDIOGRAM REPORT: CPT | Mod: ,,, | Performed by: INTERNAL MEDICINE

## 2020-11-24 ENCOUNTER — OFFICE VISIT (OUTPATIENT)
Dept: CARDIOLOGY | Facility: CLINIC | Age: 65
End: 2020-11-24
Payer: MEDICARE

## 2020-11-24 VITALS
SYSTOLIC BLOOD PRESSURE: 132 MMHG | DIASTOLIC BLOOD PRESSURE: 84 MMHG | WEIGHT: 136.44 LBS | BODY MASS INDEX: 24.96 KG/M2

## 2020-11-24 DIAGNOSIS — I50.32 CHRONIC DIASTOLIC HEART FAILURE: ICD-10-CM

## 2020-11-24 DIAGNOSIS — I48.0 PAROXYSMAL ATRIAL FIBRILLATION: Primary | ICD-10-CM

## 2020-11-24 DIAGNOSIS — I47.10 SUPRAVENTRICULAR TACHYCARDIA: ICD-10-CM

## 2020-11-24 DIAGNOSIS — I10 ESSENTIAL HYPERTENSION: ICD-10-CM

## 2020-11-24 PROCEDURE — 1126F AMNT PAIN NOTED NONE PRSNT: CPT | Mod: S$GLB,,, | Performed by: INTERNAL MEDICINE

## 2020-11-24 PROCEDURE — 93010 EKG 12-LEAD: ICD-10-PCS | Mod: S$GLB,,, | Performed by: INTERNAL MEDICINE

## 2020-11-24 PROCEDURE — 99999 PR PBB SHADOW E&M-EST. PATIENT-LVL III: CPT | Mod: PBBFAC,,, | Performed by: INTERNAL MEDICINE

## 2020-11-24 PROCEDURE — 3075F SYST BP GE 130 - 139MM HG: CPT | Mod: CPTII,S$GLB,, | Performed by: INTERNAL MEDICINE

## 2020-11-24 PROCEDURE — 3008F BODY MASS INDEX DOCD: CPT | Mod: CPTII,S$GLB,, | Performed by: INTERNAL MEDICINE

## 2020-11-24 PROCEDURE — 99214 PR OFFICE/OUTPT VISIT, EST, LEVL IV, 30-39 MIN: ICD-10-PCS | Mod: S$GLB,,, | Performed by: INTERNAL MEDICINE

## 2020-11-24 PROCEDURE — 99214 OFFICE O/P EST MOD 30 MIN: CPT | Mod: S$GLB,,, | Performed by: INTERNAL MEDICINE

## 2020-11-24 PROCEDURE — 3008F PR BODY MASS INDEX (BMI) DOCUMENTED: ICD-10-PCS | Mod: CPTII,S$GLB,, | Performed by: INTERNAL MEDICINE

## 2020-11-24 PROCEDURE — 3075F PR MOST RECENT SYSTOLIC BLOOD PRESS GE 130-139MM HG: ICD-10-PCS | Mod: CPTII,S$GLB,, | Performed by: INTERNAL MEDICINE

## 2020-11-24 PROCEDURE — 1159F MED LIST DOCD IN RCRD: CPT | Mod: S$GLB,,, | Performed by: INTERNAL MEDICINE

## 2020-11-24 PROCEDURE — 3079F DIAST BP 80-89 MM HG: CPT | Mod: CPTII,S$GLB,, | Performed by: INTERNAL MEDICINE

## 2020-11-24 PROCEDURE — 1126F PR PAIN SEVERITY QUANTIFIED, NO PAIN PRESENT: ICD-10-PCS | Mod: S$GLB,,, | Performed by: INTERNAL MEDICINE

## 2020-11-24 PROCEDURE — 1159F PR MEDICATION LIST DOCUMENTED IN MEDICAL RECORD: ICD-10-PCS | Mod: S$GLB,,, | Performed by: INTERNAL MEDICINE

## 2020-11-24 PROCEDURE — 99999 PR PBB SHADOW E&M-EST. PATIENT-LVL III: ICD-10-PCS | Mod: PBBFAC,,, | Performed by: INTERNAL MEDICINE

## 2020-11-24 PROCEDURE — 93010 ELECTROCARDIOGRAM REPORT: CPT | Mod: S$GLB,,, | Performed by: INTERNAL MEDICINE

## 2020-11-24 PROCEDURE — 3079F PR MOST RECENT DIASTOLIC BLOOD PRESSURE 80-89 MM HG: ICD-10-PCS | Mod: CPTII,S$GLB,, | Performed by: INTERNAL MEDICINE

## 2020-11-24 PROCEDURE — 93005 ELECTROCARDIOGRAM TRACING: CPT

## 2020-11-24 NOTE — PROGRESS NOTES
[facility]    Chief Complaint  Chief Complaint   Patient presents with    Atrial Fibrillation       HPI:    She has been doing well on her current medical regimen.  Palpitations have essentially resolved.  No bleeding on Eliquis.    Medications  Current Outpatient Medications   Medication Sig Dispense Refill    anastrozole (ARIMIDEX) 1 mg Tab TK 1 T PO QD  1    apixaban (ELIQUIS) 5 mg Tab Take 1 tablet (5 mg total) by mouth 2 (two) times daily. 180 tablet 3    azelastine (ASTELIN) 137 mcg (0.1 %) nasal spray 1 spray by Nasal route 2 (two) times daily.      benzonatate (TESSALON PERLES) 100 MG capsule Take 2 capsules (200 mg total) by mouth 3 (three) times daily as needed for Cough. 30 capsule 1    Ca comb no.1/vit D3/B6/FA/B12 (VITAMIN D3, CALCIUM CIT-PHOS, ORAL) Take by mouth.      cyclobenzaprine (FLEXERIL) 5 MG tablet Take 5 mg by mouth 3 (three) times daily as needed for Muscle spasms.      diltiaZEM (DILACOR XR) 180 MG CDCR Take 1 capsule (180 mg total) by mouth once daily. 90 capsule 3    flecainide (TAMBOCOR) 50 MG Tab Take 1 tablet (50 mg total) by mouth every 12 (twelve) hours. 60 tablet 11    levocetirizine (XYZAL) 5 MG tablet Take 1 tablet (5 mg total) by mouth every evening. 30 tablet 0    losartan (COZAAR) 100 MG tablet TAKE 1 TABLET EVERY DAY 90 tablet 3    multivitamin (THERAGRAN) per tablet Take 1 tablet by mouth once daily.      pravastatin (PRAVACHOL) 40 MG tablet Take 40 mg by mouth once daily.        No current facility-administered medications for this visit.         History  Past Medical History:   Diagnosis Date    Breast cancer 01/19/2017 6/2015: Right lumpectomy. Radiation. No chemotherapy.    Diastolic heart failure     Supraventricular tachycardia      Past Surgical History:   Procedure Laterality Date    BREAST LUMPECTOMY Right      Social History     Socioeconomic History    Marital status:      Spouse name: Not on file    Number of children:  Not on file    Years of education: Not on file    Highest education level: Not on file   Occupational History    Not on file   Social Needs    Financial resource strain: Not on file    Food insecurity     Worry: Not on file     Inability: Not on file    Transportation needs     Medical: Not on file     Non-medical: Not on file   Tobacco Use    Smoking status: Never Smoker    Smokeless tobacco: Never Used   Substance and Sexual Activity    Alcohol use: Not Currently    Drug use: Never    Sexual activity: Not on file   Lifestyle    Physical activity     Days per week: Not on file     Minutes per session: Not on file    Stress: Not on file   Relationships    Social connections     Talks on phone: Not on file     Gets together: Not on file     Attends Restorationism service: Not on file     Active member of club or organization: Not on file     Attends meetings of clubs or organizations: Not on file     Relationship status: Not on file   Other Topics Concern    Not on file   Social History Narrative    Not on file     History reviewed. No pertinent family history.     Allergies  Review of patient's allergies indicates:  No Known Allergies    Review of Systems   Review of Systems   Constitution: Negative for malaise/fatigue, weight gain and weight loss.   Eyes: Negative for visual disturbance.   Cardiovascular: Negative for chest pain, claudication, cyanosis, dyspnea on exertion, irregular heartbeat, leg swelling, near-syncope, orthopnea, palpitations, paroxysmal nocturnal dyspnea and syncope.   Respiratory: Negative for cough, hemoptysis, shortness of breath, sleep disturbances due to breathing and wheezing.    Hematologic/Lymphatic: Negative for bleeding problem. Does not bruise/bleed easily.   Skin: Negative for poor wound healing.   Musculoskeletal: Negative for muscle cramps and myalgias.   Gastrointestinal: Negative for abdominal pain, anorexia, diarrhea, heartburn, hematemesis, hematochezia, melena,  nausea and vomiting.   Genitourinary: Negative for hematuria and nocturia.   Neurological: Negative for excessive daytime sleepiness, dizziness, focal weakness, light-headedness and weakness.       Physical Exam  Vitals:    11/24/20 1126   BP: 132/84     Wt Readings from Last 1 Encounters:   11/24/20 61.9 kg (136 lb 7.4 oz)     Physical Exam   Constitutional: She is oriented to person, place, and time. She is cooperative.  Non-toxic appearance. No distress.   HENT:   Head: Normocephalic and atraumatic.   Eyes: Conjunctivae are normal. No scleral icterus.   Neck: Neck supple. No hepatojugular reflux and no JVD present. Carotid bruit is not present. No tracheal deviation present. No thyromegaly present.   Cardiovascular: Normal rate, regular rhythm, S1 normal and S2 normal.  No extrasystoles are present. PMI is not displaced. Exam reveals no S3 and no S4.   No murmur heard.  Pulses:       Carotid pulses are 2+ on the right side and 2+ on the left side.       Radial pulses are 2+ on the right side and 2+ on the left side.        Dorsalis pedis pulses are 2+ on the right side and 2+ on the left side.        Posterior tibial pulses are 2+ on the right side and 2+ on the left side.   Pulmonary/Chest: No accessory muscle usage. No respiratory distress. She has no decreased breath sounds. She has no wheezes. She has no rhonchi. She has no rales.   Abdominal: Soft. Bowel sounds are normal. She exhibits no pulsatile liver, no abdominal bruit and no pulsatile midline mass. There is no splenomegaly or hepatomegaly. There is no abdominal tenderness.   Musculoskeletal:         General: No tenderness, deformity or edema.   Neurological: She is alert and oriented to person, place, and time. She has normal strength. No cranial nerve deficit or sensory deficit.   Skin: Skin is warm, dry and intact. She is not diaphoretic. No cyanosis. No pallor. Nails show no clubbing.   Psychiatric: She has a normal mood and affect. Her speech is  normal and behavior is normal.       Labs  Lab Visit on 11/10/2020   Component Date Value Ref Range Status    Prothrombin Time 11/10/2020 10.7  9.0 - 12.5 sec Final    INR 11/10/2020 1.0  0.8 - 1.2 Final    Comment: Coumadin Therapy:  2.0 - 3.0 for INR for all indicators except mechanical heart valves  and antiphospholipid syndromes which should use 2.5 - 3.5.      aPTT 11/10/2020 31.4  21.0 - 32.0 sec Final    aPTT therapeutic range = 39-69 seconds    WBC 11/10/2020 8.19  3.90 - 12.70 K/uL Final    RBC 11/10/2020 4.08  4.00 - 5.40 M/uL Final    Hemoglobin 11/10/2020 13.8  12.0 - 16.0 g/dL Final    Hematocrit 11/10/2020 41.6  37.0 - 48.5 % Final    MCV 11/10/2020 102* 82 - 98 fL Final    MCH 11/10/2020 33.8* 27.0 - 31.0 pg Final    MCHC 11/10/2020 33.2  32.0 - 36.0 g/dL Final    RDW 11/10/2020 12.6  11.5 - 14.5 % Final    Platelets 11/10/2020 254  150 - 350 K/uL Final    MPV 11/10/2020 10.9  9.2 - 12.9 fL Final    Immature Granulocytes 11/10/2020 0.4  0.0 - 0.5 % Final    Gran # (ANC) 11/10/2020 5.1  1.8 - 7.7 K/uL Final    Immature Grans (Abs) 11/10/2020 0.03  0.00 - 0.04 K/uL Final    Comment: Mild elevation in immature granulocytes is non specific and   can be seen in a variety of conditions including stress response,   acute inflammation, trauma and pregnancy. Correlation with other   laboratory and clinical findings is essential.      Lymph # 11/10/2020 2.0  1.0 - 4.8 K/uL Final    Mono # 11/10/2020 0.9  0.3 - 1.0 K/uL Final    Eos # 11/10/2020 0.1  0.0 - 0.5 K/uL Final    Baso # 11/10/2020 0.06  0.00 - 0.20 K/uL Final    nRBC 11/10/2020 0  0 /100 WBC Final    Gran % 11/10/2020 62.5  38.0 - 73.0 % Final    Lymph % 11/10/2020 24.5  18.0 - 48.0 % Final    Mono % 11/10/2020 10.9  4.0 - 15.0 % Final    Eosinophil % 11/10/2020 1.0  0.0 - 8.0 % Final    Basophil % 11/10/2020 0.7  0.0 - 1.9 % Final    Differential Method 11/10/2020 Automated   Final    Sodium 11/10/2020 141  136 - 145  mmol/L Final    Potassium 11/10/2020 4.3  3.5 - 5.1 mmol/L Final    Chloride 11/10/2020 106  95 - 110 mmol/L Final    CO2 11/10/2020 25  23 - 29 mmol/L Final    Glucose 11/10/2020 91  70 - 110 mg/dL Final    BUN 11/10/2020 20  8 - 23 mg/dL Final    Creatinine 11/10/2020 0.7  0.5 - 1.4 mg/dL Final    Calcium 11/10/2020 9.5  8.7 - 10.5 mg/dL Final    Anion Gap 11/10/2020 10  8 - 16 mmol/L Final    eGFR if African American 11/10/2020 >60  >60 mL/min/1.73 m^2 Final    eGFR if non African American 11/10/2020 >60  >60 mL/min/1.73 m^2 Final    Comment: Calculation used to obtain the estimated glomerular filtration  rate (eGFR) is the CKD-EPI equation.       Magnesium 11/10/2020 1.9  1.6 - 2.6 mg/dL Final   Hospital Outpatient Visit on 10/19/2020   Component Date Value Ref Range Status    Systolic blood pressure 10/19/2020 156.0  mmHg Final    Diastolic blood pressure 10/19/2020 72.0  mmHg Final    HR at rest 10/19/2020 71.0  bpm Final    85% Max Predicted HR 10/19/2020 126   Final    RPP 10/19/2020 11,076   Final    Max Predicted HR 10/19/2020 148   Final    OHS CV CPX PATIENT IS MALE 10/19/2020 0   Final    [facility] CV CPX PATIENT IS FEMALE 10/19/2020 1   Final    Exercise duration (min) 10/19/2020 6  minutes Final    Exercise duration (sec) 10/19/2020 29  seconds Final    Peak Systolic BP 10/19/2020 188.0  mmHg Final    Peak Diatolic BP 10/19/2020 79.0  mmHg Final    Peak HR 10/19/2020 136.0  bpm Final    Estimated METs 10/19/2020 8.4   Final    % Max HR Achieved 10/19/2020 92   Final    Peak RPP 10/19/2020 25,568   Final   Hospital Outpatient Visit on 08/10/2020   Component Date Value Ref Range Status    BSA 08/10/2020 1.64  m2 Final    TDI SEPTAL 08/10/2020 0.06  m/s Final    LV LATERAL E/E' RATIO 08/10/2020 9.38  m/s Final    LV SEPTAL E/E' RATIO 08/10/2020 12.50  m/s Final    LA WIDTH 08/10/2020 4.62  cm Final    Right Atrial Pressure (from IVC) 08/10/2020 3  mmHg Final    TDI LATERAL  08/10/2020 0.08  m/s Final    PV PEAK VELOCITY 08/10/2020 1.04  cm/s Final    LVIDd 08/10/2020 5.08  3.5 - 6.0 cm Final    IVS 08/10/2020 0.73  0.6 - 1.1 cm Final    Posterior Wall 08/10/2020 0.73  0.6 - 1.1 cm Final    LVIDs 08/10/2020 3.14  2.1 - 4.0 cm Final    FS 08/10/2020 38  28 - 44 % Final    LA volume 08/10/2020 70.42  cm3 Final    Sinus 08/10/2020 3.47  cm Final    STJ 08/10/2020 2.97  cm Final    Ascending aorta 08/10/2020 3.18  cm Final    LV mass 08/10/2020 124.26  g Final    LA size 08/10/2020 3.47  cm Final    RVDD 08/10/2020 2.70  cm Final    TAPSE 08/10/2020 3.09  cm Final    RV S' 08/10/2020 16.24  cm/s Final    Left Ventricle Relative Wall Thick* 08/10/2020 0.29  cm Final    AV regurgitation pressure 1/2 time 08/10/2020 925  ms Final    AV mean gradient 08/10/2020 8  mmHg Final    AV valve area 08/10/2020 3.05  cm2 Final    AV Velocity Ratio 08/10/2020 0.73   Final    AV index (prosthetic) 08/10/2020 0.88   Final    MV valve area p 1/2 method 08/10/2020 3.78  cm2 Final    E/A ratio 08/10/2020 1.17   Final    Mean e' 08/10/2020 0.07  m/s Final    E wave decelartion time 08/10/2020 200.65  msec Final    IVRT 08/10/2020 125.69  msec Final    Pulm vein S/D ratio 08/10/2020 1.48   Final    LVOT diameter 08/10/2020 2.10  cm Final    LVOT area 08/10/2020 3.5  cm2 Final    LVOT peak mark 08/10/2020 1.39  m/s Final    LVOT peak VTI 08/10/2020 32.69  cm Final    Ao peak mark 08/10/2020 1.91  m/s Final    Ao VTI 08/10/2020 37.09  cm Final    Mr max mark 08/10/2020 0.04  m/s Final    LVOT stroke volume 08/10/2020 113.17  cm3 Final    AV peak gradient 08/10/2020 15  mmHg Final    TV rest pulmonary artery pressure 08/10/2020 26  mmHg Final    E/E' ratio 08/10/2020 10.71  m/s Final    MV Peak E Mark 08/10/2020 0.75  m/s Final    TR Max Mark 08/10/2020 2.40  m/s Final    MV stenosis pressure 1/2 time 08/10/2020 58.19  ms Final    MV Peak A Mark 08/10/2020 0.64  m/s Final    PV  Peak S Mark 08/10/2020 0.65  m/s Final    PV Peak D Mark 08/10/2020 0.44  m/s Final    LV Systolic Volume 08/10/2020 38.98  mL Final    LV Systolic Volume Index 08/10/2020 24.0  mL/m2 Final    LV Diastolic Volume 08/10/2020 122.49  mL Final    LV Diastolic Volume Index 08/10/2020 75.49  mL/m2 Final    LA Volume Index 08/10/2020 43.4  mL/m2 Final    LV Mass Index 08/10/2020 77  g/m2 Final    RA Major Axis 08/10/2020 4.70  cm Final    Left Atrium Minor Axis 08/10/2020 5.37  cm Final    Left Atrium Major Axis 08/10/2020 4.98  cm Final    Triscuspid Valve Regurgitation Pea* 08/10/2020 23  mmHg Final    LA Volume Index (Mod) 08/10/2020 38.2  mL/m2 Final    LA volume (mod) 08/10/2020 62.00  cm3 Final    RA Width 08/10/2020 3.02  cm Final   Lab Visit on 08/10/2020   Component Date Value Ref Range Status    WBC 08/10/2020 7.28  3.90 - 12.70 K/uL Final    RBC 08/10/2020 4.13  4.00 - 5.40 M/uL Final    Hemoglobin 08/10/2020 14.0  12.0 - 16.0 g/dL Final    Hematocrit 08/10/2020 42.2  37.0 - 48.5 % Final    MCV 08/10/2020 102* 82 - 98 fL Final    MCH 08/10/2020 33.9* 27.0 - 31.0 pg Final    MCHC 08/10/2020 33.2  32.0 - 36.0 g/dL Final    RDW 08/10/2020 12.2  11.5 - 14.5 % Final    Platelets 08/10/2020 258  150 - 350 K/uL Final    MPV 08/10/2020 11.2  9.2 - 12.9 fL Final    Sodium 08/10/2020 141  136 - 145 mmol/L Final    Potassium 08/10/2020 4.0  3.5 - 5.1 mmol/L Final    Chloride 08/10/2020 105  95 - 110 mmol/L Final    CO2 08/10/2020 26  23 - 29 mmol/L Final    Glucose 08/10/2020 89  70 - 110 mg/dL Final    BUN 08/10/2020 17  8 - 23 mg/dL Final    Creatinine 08/10/2020 0.7  0.5 - 1.4 mg/dL Final    Calcium 08/10/2020 9.6  8.7 - 10.5 mg/dL Final    Total Protein 08/10/2020 6.9  6.0 - 8.4 g/dL Final    Albumin 08/10/2020 4.1  3.5 - 5.2 g/dL Final    Total Bilirubin 08/10/2020 0.7  0.1 - 1.0 mg/dL Final    Comment: For infants and newborns, interpretation of results should be based  on  gestational age, weight and in agreement with clinical  observations.  Premature Infant recommended reference ranges:  Up to 24 hours.............<8.0 mg/dL  Up to 48 hours............<12.0 mg/dL  3-5 days..................<15.0 mg/dL  6-29 days.................<15.0 mg/dL      Alkaline Phosphatase 08/10/2020 104  55 - 135 U/L Final    AST 08/10/2020 25  10 - 40 U/L Final    ALT 08/10/2020 14  10 - 44 U/L Final    Anion Gap 08/10/2020 10  8 - 16 mmol/L Final    eGFR if African American 08/10/2020 >60  >60 mL/min/1.73 m^2 Final    eGFR if non African American 08/10/2020 >60  >60 mL/min/1.73 m^2 Final    Comment: Calculation used to obtain the estimated glomerular filtration  rate (eGFR) is the CKD-EPI equation.       TSH 08/10/2020 1.209  0.400 - 4.000 uIU/mL Final       Imaging  No results found.    Assessment  1. Paroxysmal atrial fibrillation  Controlled    2. Supraventricular tachycardia  Remote history of AVNRT    3. Essential hypertension  Controlled    4. Chronic diastolic heart failure  Compensated      Plan and Discussion    Continue current guideline directed medical therapy.  Encouraged to start exercising again.      Follow Up  Follow up in about 3 months (around 2/24/2021).      Vargas Norman MD

## 2021-02-23 ENCOUNTER — OFFICE VISIT (OUTPATIENT)
Dept: CARDIOLOGY | Facility: CLINIC | Age: 66
End: 2021-02-23
Payer: MEDICARE

## 2021-02-23 VITALS
WEIGHT: 141.56 LBS | BODY MASS INDEX: 25.89 KG/M2 | SYSTOLIC BLOOD PRESSURE: 134 MMHG | DIASTOLIC BLOOD PRESSURE: 72 MMHG

## 2021-02-23 DIAGNOSIS — I10 ESSENTIAL HYPERTENSION: ICD-10-CM

## 2021-02-23 DIAGNOSIS — Z79.01 LONG TERM CURRENT USE OF ANTICOAGULANT: ICD-10-CM

## 2021-02-23 DIAGNOSIS — I50.32 CHRONIC DIASTOLIC HEART FAILURE: ICD-10-CM

## 2021-02-23 DIAGNOSIS — I48.0 PAROXYSMAL ATRIAL FIBRILLATION: Primary | ICD-10-CM

## 2021-02-23 DIAGNOSIS — I47.10 SUPRAVENTRICULAR TACHYCARDIA: ICD-10-CM

## 2021-02-23 PROCEDURE — 1126F AMNT PAIN NOTED NONE PRSNT: CPT | Mod: S$GLB,,, | Performed by: INTERNAL MEDICINE

## 2021-02-23 PROCEDURE — 99999 PR PBB SHADOW E&M-EST. PATIENT-LVL III: ICD-10-PCS | Mod: PBBFAC,,, | Performed by: INTERNAL MEDICINE

## 2021-02-23 PROCEDURE — 99214 OFFICE O/P EST MOD 30 MIN: CPT | Mod: S$GLB,,, | Performed by: INTERNAL MEDICINE

## 2021-02-23 PROCEDURE — 3008F PR BODY MASS INDEX (BMI) DOCUMENTED: ICD-10-PCS | Mod: CPTII,S$GLB,, | Performed by: INTERNAL MEDICINE

## 2021-02-23 PROCEDURE — 99999 PR PBB SHADOW E&M-EST. PATIENT-LVL III: CPT | Mod: PBBFAC,,, | Performed by: INTERNAL MEDICINE

## 2021-02-23 PROCEDURE — 93010 ELECTROCARDIOGRAM REPORT: CPT | Mod: S$GLB,,, | Performed by: INTERNAL MEDICINE

## 2021-02-23 PROCEDURE — 1159F PR MEDICATION LIST DOCUMENTED IN MEDICAL RECORD: ICD-10-PCS | Mod: S$GLB,,, | Performed by: INTERNAL MEDICINE

## 2021-02-23 PROCEDURE — 3075F PR MOST RECENT SYSTOLIC BLOOD PRESS GE 130-139MM HG: ICD-10-PCS | Mod: CPTII,S$GLB,, | Performed by: INTERNAL MEDICINE

## 2021-02-23 PROCEDURE — 3078F DIAST BP <80 MM HG: CPT | Mod: CPTII,S$GLB,, | Performed by: INTERNAL MEDICINE

## 2021-02-23 PROCEDURE — 3078F PR MOST RECENT DIASTOLIC BLOOD PRESSURE < 80 MM HG: ICD-10-PCS | Mod: CPTII,S$GLB,, | Performed by: INTERNAL MEDICINE

## 2021-02-23 PROCEDURE — 1126F PR PAIN SEVERITY QUANTIFIED, NO PAIN PRESENT: ICD-10-PCS | Mod: S$GLB,,, | Performed by: INTERNAL MEDICINE

## 2021-02-23 PROCEDURE — 3075F SYST BP GE 130 - 139MM HG: CPT | Mod: CPTII,S$GLB,, | Performed by: INTERNAL MEDICINE

## 2021-02-23 PROCEDURE — 1159F MED LIST DOCD IN RCRD: CPT | Mod: S$GLB,,, | Performed by: INTERNAL MEDICINE

## 2021-02-23 PROCEDURE — 99214 PR OFFICE/OUTPT VISIT, EST, LEVL IV, 30-39 MIN: ICD-10-PCS | Mod: S$GLB,,, | Performed by: INTERNAL MEDICINE

## 2021-02-23 PROCEDURE — 3008F BODY MASS INDEX DOCD: CPT | Mod: CPTII,S$GLB,, | Performed by: INTERNAL MEDICINE

## 2021-02-23 PROCEDURE — 93010 EKG 12-LEAD: ICD-10-PCS | Mod: S$GLB,,, | Performed by: INTERNAL MEDICINE

## 2021-02-23 PROCEDURE — 93005 ELECTROCARDIOGRAM TRACING: CPT

## 2021-02-23 RX ORDER — TOPIRAMATE 25 MG/1
TABLET ORAL DAILY
COMMUNITY
Start: 2021-02-10

## 2021-04-16 ENCOUNTER — PATIENT MESSAGE (OUTPATIENT)
Dept: RESEARCH | Facility: HOSPITAL | Age: 66
End: 2021-04-16

## 2021-06-02 ENCOUNTER — TELEPHONE (OUTPATIENT)
Dept: CARDIOLOGY | Facility: CLINIC | Age: 66
End: 2021-06-02

## 2021-06-03 ENCOUNTER — TELEPHONE (OUTPATIENT)
Dept: CARDIOLOGY | Facility: CLINIC | Age: 66
End: 2021-06-03

## 2021-08-17 ENCOUNTER — LAB VISIT (OUTPATIENT)
Dept: LAB | Facility: OTHER | Age: 66
End: 2021-08-17
Attending: INTERNAL MEDICINE
Payer: MEDICARE

## 2021-08-17 DIAGNOSIS — Z79.01 LONG TERM CURRENT USE OF ANTICOAGULANT: ICD-10-CM

## 2021-08-17 LAB
BASOPHILS # BLD AUTO: 0.08 K/UL (ref 0–0.2)
BASOPHILS NFR BLD: 0.9 % (ref 0–1.9)
DIFFERENTIAL METHOD: ABNORMAL
EOSINOPHIL # BLD AUTO: 0.1 K/UL (ref 0–0.5)
EOSINOPHIL NFR BLD: 1 % (ref 0–8)
ERYTHROCYTE [DISTWIDTH] IN BLOOD BY AUTOMATED COUNT: 12.5 % (ref 11.5–14.5)
HCT VFR BLD AUTO: 39.5 % (ref 37–48.5)
HGB BLD-MCNC: 13.2 G/DL (ref 12–16)
IMM GRANULOCYTES # BLD AUTO: 0.03 K/UL (ref 0–0.04)
IMM GRANULOCYTES NFR BLD AUTO: 0.3 % (ref 0–0.5)
LYMPHOCYTES # BLD AUTO: 3.2 K/UL (ref 1–4.8)
LYMPHOCYTES NFR BLD: 35.2 % (ref 18–48)
MCH RBC QN AUTO: 34.3 PG (ref 27–31)
MCHC RBC AUTO-ENTMCNC: 33.4 G/DL (ref 32–36)
MCV RBC AUTO: 103 FL (ref 82–98)
MONOCYTES # BLD AUTO: 1 K/UL (ref 0.3–1)
MONOCYTES NFR BLD: 11.1 % (ref 4–15)
NEUTROPHILS # BLD AUTO: 4.8 K/UL (ref 1.8–7.7)
NEUTROPHILS NFR BLD: 51.5 % (ref 38–73)
NRBC BLD-RTO: 0 /100 WBC
PLATELET # BLD AUTO: 258 K/UL (ref 150–450)
PMV BLD AUTO: 11.2 FL (ref 9.2–12.9)
RBC # BLD AUTO: 3.85 M/UL (ref 4–5.4)
WBC # BLD AUTO: 9.21 K/UL (ref 3.9–12.7)

## 2021-08-17 PROCEDURE — 85025 COMPLETE CBC W/AUTO DIFF WBC: CPT | Performed by: INTERNAL MEDICINE

## 2021-08-17 PROCEDURE — 36415 COLL VENOUS BLD VENIPUNCTURE: CPT | Performed by: INTERNAL MEDICINE

## 2021-08-19 ENCOUNTER — TELEPHONE (OUTPATIENT)
Dept: CARDIOLOGY | Facility: CLINIC | Age: 66
End: 2021-08-19

## 2021-08-24 ENCOUNTER — OFFICE VISIT (OUTPATIENT)
Dept: CARDIOLOGY | Facility: CLINIC | Age: 66
End: 2021-08-24
Payer: MEDICARE

## 2021-08-24 VITALS
WEIGHT: 131.69 LBS | DIASTOLIC BLOOD PRESSURE: 70 MMHG | SYSTOLIC BLOOD PRESSURE: 138 MMHG | HEART RATE: 55 BPM | OXYGEN SATURATION: 97 % | BODY MASS INDEX: 24.09 KG/M2

## 2021-08-24 DIAGNOSIS — I10 ESSENTIAL HYPERTENSION: ICD-10-CM

## 2021-08-24 DIAGNOSIS — I50.32 CHRONIC DIASTOLIC HEART FAILURE: ICD-10-CM

## 2021-08-24 DIAGNOSIS — I48.0 PAROXYSMAL ATRIAL FIBRILLATION: Primary | ICD-10-CM

## 2021-08-24 DIAGNOSIS — I47.10 SUPRAVENTRICULAR TACHYCARDIA: ICD-10-CM

## 2021-08-24 PROCEDURE — 1101F PT FALLS ASSESS-DOCD LE1/YR: CPT | Mod: CPTII,S$GLB,, | Performed by: INTERNAL MEDICINE

## 2021-08-24 PROCEDURE — 3288F FALL RISK ASSESSMENT DOCD: CPT | Mod: CPTII,S$GLB,, | Performed by: INTERNAL MEDICINE

## 2021-08-24 PROCEDURE — 93005 ELECTROCARDIOGRAM TRACING: CPT

## 2021-08-24 PROCEDURE — 3075F PR MOST RECENT SYSTOLIC BLOOD PRESS GE 130-139MM HG: ICD-10-PCS | Mod: CPTII,S$GLB,, | Performed by: INTERNAL MEDICINE

## 2021-08-24 PROCEDURE — 3008F PR BODY MASS INDEX (BMI) DOCUMENTED: ICD-10-PCS | Mod: CPTII,S$GLB,, | Performed by: INTERNAL MEDICINE

## 2021-08-24 PROCEDURE — 1101F PR PT FALLS ASSESS DOC 0-1 FALLS W/OUT INJ PAST YR: ICD-10-PCS | Mod: CPTII,S$GLB,, | Performed by: INTERNAL MEDICINE

## 2021-08-24 PROCEDURE — 93010 ELECTROCARDIOGRAM REPORT: CPT | Mod: S$GLB,,, | Performed by: INTERNAL MEDICINE

## 2021-08-24 PROCEDURE — 1159F PR MEDICATION LIST DOCUMENTED IN MEDICAL RECORD: ICD-10-PCS | Mod: CPTII,S$GLB,, | Performed by: INTERNAL MEDICINE

## 2021-08-24 PROCEDURE — 3078F PR MOST RECENT DIASTOLIC BLOOD PRESSURE < 80 MM HG: ICD-10-PCS | Mod: CPTII,S$GLB,, | Performed by: INTERNAL MEDICINE

## 2021-08-24 PROCEDURE — 1126F AMNT PAIN NOTED NONE PRSNT: CPT | Mod: CPTII,S$GLB,, | Performed by: INTERNAL MEDICINE

## 2021-08-24 PROCEDURE — 99999 PR PBB SHADOW E&M-EST. PATIENT-LVL III: CPT | Mod: PBBFAC,,, | Performed by: INTERNAL MEDICINE

## 2021-08-24 PROCEDURE — 3078F DIAST BP <80 MM HG: CPT | Mod: CPTII,S$GLB,, | Performed by: INTERNAL MEDICINE

## 2021-08-24 PROCEDURE — 99214 OFFICE O/P EST MOD 30 MIN: CPT | Mod: 25,S$GLB,, | Performed by: INTERNAL MEDICINE

## 2021-08-24 PROCEDURE — 3075F SYST BP GE 130 - 139MM HG: CPT | Mod: CPTII,S$GLB,, | Performed by: INTERNAL MEDICINE

## 2021-08-24 PROCEDURE — 3008F BODY MASS INDEX DOCD: CPT | Mod: CPTII,S$GLB,, | Performed by: INTERNAL MEDICINE

## 2021-08-24 PROCEDURE — 1159F MED LIST DOCD IN RCRD: CPT | Mod: CPTII,S$GLB,, | Performed by: INTERNAL MEDICINE

## 2021-08-24 PROCEDURE — 1126F PR PAIN SEVERITY QUANTIFIED, NO PAIN PRESENT: ICD-10-PCS | Mod: CPTII,S$GLB,, | Performed by: INTERNAL MEDICINE

## 2021-08-24 PROCEDURE — 99999 PR PBB SHADOW E&M-EST. PATIENT-LVL III: ICD-10-PCS | Mod: PBBFAC,,, | Performed by: INTERNAL MEDICINE

## 2021-08-24 PROCEDURE — 3288F PR FALLS RISK ASSESSMENT DOCUMENTED: ICD-10-PCS | Mod: CPTII,S$GLB,, | Performed by: INTERNAL MEDICINE

## 2021-08-24 PROCEDURE — 93010 EKG 12-LEAD: ICD-10-PCS | Mod: S$GLB,,, | Performed by: INTERNAL MEDICINE

## 2021-08-24 PROCEDURE — 99214 PR OFFICE/OUTPT VISIT, EST, LEVL IV, 30-39 MIN: ICD-10-PCS | Mod: 25,S$GLB,, | Performed by: INTERNAL MEDICINE

## 2022-02-14 DIAGNOSIS — I48.0 PAROXYSMAL ATRIAL FIBRILLATION: ICD-10-CM

## 2022-02-14 NOTE — TELEPHONE ENCOUNTER
----- Message from Tisha Cisco sent at 2/14/2022  8:10 AM CST -----  Contact: KEVAN LOVETT [[id number]]  Type: Call Back    Who called:JABENPRASANNA KEVAN PALMER [[id number]]    What is the request in detail: Patient is requesting a call back in regards to her ELIQUIS 5 mg Tab. She states that she needs an emergency supply sent to the local pharmacy. She states that [employer] will take about 5 days to deliver and she is out of medication. Please advise.     Can the clinic reply by [facility]? No    Would the patient rather a call back or a response via [facility]? Call back     Best call back number: [phone] (mobile)    Additional Information: [facility] - [place], [place] - [address] [address]

## 2022-02-15 ENCOUNTER — TELEPHONE (OUTPATIENT)
Dept: CARDIOLOGY | Facility: CLINIC | Age: 67
End: 2022-02-15
Payer: MEDICARE

## 2022-02-15 NOTE — TELEPHONE ENCOUNTER
Returned call to patient. No answer. Left a message that her Eliquis was not canceled.    ----- Message from Radha Prater sent at 2/15/2022  4:09 PM CST -----  Regarding: medication concerns  Name of Who is Calling: Stefany           What is the request in detail: Patient is requesting a call back to find out why her mail order for apixaban (ELIQUIS) 5 mg Tab was canceled. She only received an emergency supply to last until the mail order arrives.           Can the clinic reply by [facility]: No           What Number to Call Back if not in [facility]: [phone]

## 2022-02-18 ENCOUNTER — OFFICE VISIT (OUTPATIENT)
Dept: CARDIOLOGY | Facility: CLINIC | Age: 67
End: 2022-02-18
Payer: MEDICARE

## 2022-02-18 VITALS
SYSTOLIC BLOOD PRESSURE: 142 MMHG | DIASTOLIC BLOOD PRESSURE: 78 MMHG | HEART RATE: 59 BPM | OXYGEN SATURATION: 98 % | BODY MASS INDEX: 22.34 KG/M2 | WEIGHT: 122.13 LBS

## 2022-02-18 DIAGNOSIS — I47.10 SUPRAVENTRICULAR TACHYCARDIA: Primary | ICD-10-CM

## 2022-02-18 DIAGNOSIS — I10 ESSENTIAL HYPERTENSION: ICD-10-CM

## 2022-02-18 DIAGNOSIS — I48.0 PAROXYSMAL ATRIAL FIBRILLATION: ICD-10-CM

## 2022-02-18 DIAGNOSIS — I50.32 CHRONIC DIASTOLIC HEART FAILURE: ICD-10-CM

## 2022-02-18 PROCEDURE — 1159F PR MEDICATION LIST DOCUMENTED IN MEDICAL RECORD: ICD-10-PCS | Mod: CPTII,S$GLB,, | Performed by: INTERNAL MEDICINE

## 2022-02-18 PROCEDURE — 3008F PR BODY MASS INDEX (BMI) DOCUMENTED: ICD-10-PCS | Mod: CPTII,S$GLB,, | Performed by: INTERNAL MEDICINE

## 2022-02-18 PROCEDURE — 1101F PT FALLS ASSESS-DOCD LE1/YR: CPT | Mod: CPTII,S$GLB,, | Performed by: INTERNAL MEDICINE

## 2022-02-18 PROCEDURE — 99215 PR OFFICE/OUTPT VISIT, EST, LEVL V, 40-54 MIN: ICD-10-PCS | Mod: S$GLB,,, | Performed by: INTERNAL MEDICINE

## 2022-02-18 PROCEDURE — 3288F PR FALLS RISK ASSESSMENT DOCUMENTED: ICD-10-PCS | Mod: CPTII,S$GLB,, | Performed by: INTERNAL MEDICINE

## 2022-02-18 PROCEDURE — 99999 PR PBB SHADOW E&M-EST. PATIENT-LVL III: CPT | Mod: PBBFAC,,, | Performed by: INTERNAL MEDICINE

## 2022-02-18 PROCEDURE — 3008F BODY MASS INDEX DOCD: CPT | Mod: CPTII,S$GLB,, | Performed by: INTERNAL MEDICINE

## 2022-02-18 PROCEDURE — 3077F PR MOST RECENT SYSTOLIC BLOOD PRESSURE >= 140 MM HG: ICD-10-PCS | Mod: CPTII,S$GLB,, | Performed by: INTERNAL MEDICINE

## 2022-02-18 PROCEDURE — 3078F PR MOST RECENT DIASTOLIC BLOOD PRESSURE < 80 MM HG: ICD-10-PCS | Mod: CPTII,S$GLB,, | Performed by: INTERNAL MEDICINE

## 2022-02-18 PROCEDURE — 1159F MED LIST DOCD IN RCRD: CPT | Mod: CPTII,S$GLB,, | Performed by: INTERNAL MEDICINE

## 2022-02-18 PROCEDURE — 1126F PR PAIN SEVERITY QUANTIFIED, NO PAIN PRESENT: ICD-10-PCS | Mod: CPTII,S$GLB,, | Performed by: INTERNAL MEDICINE

## 2022-02-18 PROCEDURE — 3077F SYST BP >= 140 MM HG: CPT | Mod: CPTII,S$GLB,, | Performed by: INTERNAL MEDICINE

## 2022-02-18 PROCEDURE — 99999 PR PBB SHADOW E&M-EST. PATIENT-LVL III: ICD-10-PCS | Mod: PBBFAC,,, | Performed by: INTERNAL MEDICINE

## 2022-02-18 PROCEDURE — 3288F FALL RISK ASSESSMENT DOCD: CPT | Mod: CPTII,S$GLB,, | Performed by: INTERNAL MEDICINE

## 2022-02-18 PROCEDURE — 99215 OFFICE O/P EST HI 40 MIN: CPT | Mod: S$GLB,,, | Performed by: INTERNAL MEDICINE

## 2022-02-18 PROCEDURE — 1101F PR PT FALLS ASSESS DOC 0-1 FALLS W/OUT INJ PAST YR: ICD-10-PCS | Mod: CPTII,S$GLB,, | Performed by: INTERNAL MEDICINE

## 2022-02-18 PROCEDURE — 1126F AMNT PAIN NOTED NONE PRSNT: CPT | Mod: CPTII,S$GLB,, | Performed by: INTERNAL MEDICINE

## 2022-02-18 PROCEDURE — 3078F DIAST BP <80 MM HG: CPT | Mod: CPTII,S$GLB,, | Performed by: INTERNAL MEDICINE

## 2022-02-18 NOTE — PROGRESS NOTES
[facility]    Chief Complaint  Chief Complaint   Patient presents with    Atrial Fibrillation       HPI:    A lot of stress at home.  Feeling more palpitations which she believes is related to stress.  Has done some Kardia tracings.  A lot of artifact but does not appear to be atrial fibrillation.    Medications  Current Outpatient Medications   Medication Sig Dispense Refill    apixaban (ELIQUIS) 5 mg Tab Take 1 tablet (5 mg total) by mouth 2 (two) times daily. 180 tablet 3    apixaban (ELIQUIS) 5 mg Tab Take 1 tablet (5 mg total) by mouth 2 (two) times daily. 28 tablet 0    Ca comb no.1/vit D3/B6/FA/B12 (VITAMIN D3, CALCIUM CIT-PHOS, ORAL) Take by mouth.      DILT- mg CDCR TAKE 1 CAPSULE EVERY DAY 90 capsule 3    flecainide (TAMBOCOR) 50 MG Tab Take 1 tablet (50 mg total) by mouth every 12 (twelve) hours. 180 tablet 3    losartan (COZAAR) 100 MG tablet TAKE 1 TABLET EVERY DAY 90 tablet 3    multivitamin (THERAGRAN) per tablet Take 1 tablet by mouth once daily.      pravastatin (PRAVACHOL) 40 MG tablet Take 40 mg by mouth once daily.       topiramate (TOPAMAX) 25 MG tablet Take by mouth once daily.       No current facility-administered medications for this visit.        History  Past Medical History:   Diagnosis Date    Breast cancer 01/19/2017 6/2015: Right lumpectomy. Radiation. No chemotherapy.    Diastolic heart failure     Familial tremor     Supraventricular tachycardia      Past Surgical History:   Procedure Laterality Date    BREAST LUMPECTOMY Right      Social History     Socioeconomic History    Marital status:    Tobacco Use    Smoking status: Never Smoker    Smokeless tobacco: Never Used   Substance and Sexual Activity    Alcohol use: Not Currently    Drug use: Never     History reviewed. No pertinent family history.     Allergies  Review of patient's allergies indicates:  No Known Allergies    Review of Systems   Review of Systems   Constitutional:  Negative for malaise/fatigue, weight gain and weight loss.   Eyes: Negative for visual disturbance.   Cardiovascular: Positive for palpitations. Negative for chest pain, claudication, cyanosis, dyspnea on exertion, irregular heartbeat, leg swelling, near-syncope, orthopnea, paroxysmal nocturnal dyspnea and syncope.   Respiratory: Negative for cough, hemoptysis, shortness of breath, sleep disturbances due to breathing and wheezing.    Hematologic/Lymphatic: Negative for bleeding problem. Does not bruise/bleed easily.   Skin: Negative for poor wound healing.   Musculoskeletal: Negative for muscle cramps and myalgias.   Gastrointestinal: Negative for abdominal pain, anorexia, diarrhea, heartburn, hematemesis, hematochezia, melena, nausea and vomiting.   Genitourinary: Negative for hematuria and nocturia.   Neurological: Negative for excessive daytime sleepiness, dizziness, focal weakness, light-headedness and weakness.   Psychiatric/Behavioral: The patient is nervous/anxious.        Physical Exam  Vitals:    02/18/22 1150   BP: (!) 142/78   Pulse: (!) 59     Wt Readings from Last 1 Encounters:   02/18/22 55.4 kg (122 lb 2.2 oz)     Physical Exam  Constitutional:       General: She is not in acute distress.     Appearance: She is not toxic-appearing or diaphoretic.   HENT:      Head: Normocephalic and atraumatic.   Eyes:      General: No scleral icterus.     Conjunctiva/sclera: Conjunctivae normal.   Neck:      Thyroid: No thyromegaly.      Vascular: No carotid bruit, hepatojugular reflux or JVD.      Trachea: Trachea normal.   Cardiovascular:      Rate and Rhythm: Normal rate and regular rhythm.  No extrasystoles are present.     Chest Wall: PMI is not displaced.      Pulses:           Carotid pulses are 2+ on the right side and 2+ on the left side.       Radial pulses are 2+ on the right side and 2+ on the left side.        Dorsalis pedis pulses are 2+ on the right side and 2+ on the left side.        Posterior tibial  pulses are 2+ on the right side and 2+ on the left side.      Heart sounds: S1 normal and S2 normal. No murmur heard.  No S3 or S4 sounds.    Pulmonary:      Effort: No accessory muscle usage or respiratory distress.      Breath sounds: No decreased breath sounds, wheezing, rhonchi or rales.   Abdominal:      General: Bowel sounds are normal. There is no abdominal bruit.      Palpations: Abdomen is soft. There is no hepatomegaly, splenomegaly or pulsatile mass.      Tenderness: There is no abdominal tenderness.   Musculoskeletal:         General: No tenderness or deformity.      Right lower leg: No edema.      Left lower leg: No edema.   Skin:     General: Skin is warm and dry.      Coloration: Skin is not cyanotic or pale.      Nails: There is no clubbing.   Neurological:      General: No focal deficit present.      Mental Status: She is alert and oriented to person, place, and time.      Motor: Tremor present.   Psychiatric:         Speech: Speech normal.         Behavior: Behavior normal. Behavior is cooperative.         Labs  No visits with results within 6 Month(s) from this visit.   Latest known visit with results is:   Lab Visit on 08/17/2021   Component Date Value Ref Range Status    WBC 08/17/2021 9.21  3.90 - 12.70 K/uL Final    RBC 08/17/2021 3.85* 4.00 - 5.40 M/uL Final    Hemoglobin 08/17/2021 13.2  12.0 - 16.0 g/dL Final    Hematocrit 08/17/2021 39.5  37.0 - 48.5 % Final    MCV 08/17/2021 103* 82 - 98 fL Final    MCH 08/17/2021 34.3* 27.0 - 31.0 pg Final    MCHC 08/17/2021 33.4  32.0 - 36.0 g/dL Final    RDW 08/17/2021 12.5  11.5 - 14.5 % Final    Platelets 08/17/2021 258  150 - 450 K/uL Final    MPV 08/17/2021 11.2  9.2 - 12.9 fL Final    Immature Granulocytes 08/17/2021 0.3  0.0 - 0.5 % Final    Gran # (ANC) 08/17/2021 4.8  1.8 - 7.7 K/uL Final    Immature Grans (Abs) 08/17/2021 0.03  0.00 - 0.04 K/uL Final    Comment: Mild elevation in immature granulocytes is non specific and   can be  seen in a variety of conditions including stress response,   acute inflammation, trauma and pregnancy. Correlation with other   laboratory and clinical findings is essential.      Lymph # 08/17/2021 3.2  1.0 - 4.8 K/uL Final    Mono # 08/17/2021 1.0  0.3 - 1.0 K/uL Final    Eos # 08/17/2021 0.1  0.0 - 0.5 K/uL Final    Baso # 08/17/2021 0.08  0.00 - 0.20 K/uL Final    nRBC 08/17/2021 0  0 /100 WBC Final    Gran % 08/17/2021 51.5  38.0 - 73.0 % Final    Lymph % 08/17/2021 35.2  18.0 - 48.0 % Final    Mono % 08/17/2021 11.1  4.0 - 15.0 % Final    Eosinophil % 08/17/2021 1.0  0.0 - 8.0 % Final    Basophil % 08/17/2021 0.9  0.0 - 1.9 % Final    Differential Method 08/17/2021 Automated   Final       Imaging  No results found.    Assessment  1. Paroxysmal atrial fibrillation  Tolerating anticoagulation  - apixaban (ELIQUIS) 5 mg Tab; Take 1 tablet (5 mg total) by mouth 2 (two) times daily.  Dispense: 180 tablet; Refill: 3  - apixaban (ELIQUIS) 5 mg Tab; Take 1 tablet (5 mg total) by mouth 2 (two) times daily.  Dispense: 28 tablet; Refill: 0    2. Supraventricular tachycardia  Controlled    3. Chronic diastolic heart failure  Compensated    4. Essential hypertension  Controlled      Plan and Discussion    Continue current guideline directed medical therapy.    She is going to discuss anxiolytics with Dr. Nugent at an upcoming appointment.  I told her that it would be safe.  Need to avoid QT prolonging agents.    Follow Up  Follow up in about 6 months (around 8/18/2022).      Vargas Norman MD

## 2022-05-12 ENCOUNTER — OFFICE VISIT (OUTPATIENT)
Dept: CARDIOLOGY | Facility: CLINIC | Age: 67
End: 2022-05-12
Payer: MEDICARE

## 2022-05-12 VITALS
WEIGHT: 119.81 LBS | SYSTOLIC BLOOD PRESSURE: 132 MMHG | BODY MASS INDEX: 21.91 KG/M2 | HEART RATE: 55 BPM | DIASTOLIC BLOOD PRESSURE: 74 MMHG | OXYGEN SATURATION: 98 %

## 2022-05-12 DIAGNOSIS — I48.0 PAROXYSMAL ATRIAL FIBRILLATION: Primary | ICD-10-CM

## 2022-05-12 DIAGNOSIS — I10 ESSENTIAL HYPERTENSION: ICD-10-CM

## 2022-05-12 DIAGNOSIS — I47.10 SUPRAVENTRICULAR TACHYCARDIA: ICD-10-CM

## 2022-05-12 DIAGNOSIS — I50.32 CHRONIC DIASTOLIC HEART FAILURE: ICD-10-CM

## 2022-05-12 PROCEDURE — 3008F BODY MASS INDEX DOCD: CPT | Mod: CPTII,S$GLB,, | Performed by: INTERNAL MEDICINE

## 2022-05-12 PROCEDURE — 3008F PR BODY MASS INDEX (BMI) DOCUMENTED: ICD-10-PCS | Mod: CPTII,S$GLB,, | Performed by: INTERNAL MEDICINE

## 2022-05-12 PROCEDURE — 4010F PR ACE/ARB THEARPY RXD/TAKEN: ICD-10-PCS | Mod: CPTII,S$GLB,, | Performed by: INTERNAL MEDICINE

## 2022-05-12 PROCEDURE — 99999 PR PBB SHADOW E&M-EST. PATIENT-LVL III: CPT | Mod: PBBFAC,,, | Performed by: INTERNAL MEDICINE

## 2022-05-12 PROCEDURE — 99999 PR PBB SHADOW E&M-EST. PATIENT-LVL III: ICD-10-PCS | Mod: PBBFAC,,, | Performed by: INTERNAL MEDICINE

## 2022-05-12 PROCEDURE — 1101F PR PT FALLS ASSESS DOC 0-1 FALLS W/OUT INJ PAST YR: ICD-10-PCS | Mod: CPTII,S$GLB,, | Performed by: INTERNAL MEDICINE

## 2022-05-12 PROCEDURE — 99214 PR OFFICE/OUTPT VISIT, EST, LEVL IV, 30-39 MIN: ICD-10-PCS | Mod: S$GLB,,, | Performed by: INTERNAL MEDICINE

## 2022-05-12 PROCEDURE — 3078F PR MOST RECENT DIASTOLIC BLOOD PRESSURE < 80 MM HG: ICD-10-PCS | Mod: CPTII,S$GLB,, | Performed by: INTERNAL MEDICINE

## 2022-05-12 PROCEDURE — 3078F DIAST BP <80 MM HG: CPT | Mod: CPTII,S$GLB,, | Performed by: INTERNAL MEDICINE

## 2022-05-12 PROCEDURE — 3075F SYST BP GE 130 - 139MM HG: CPT | Mod: CPTII,S$GLB,, | Performed by: INTERNAL MEDICINE

## 2022-05-12 PROCEDURE — 4010F ACE/ARB THERAPY RXD/TAKEN: CPT | Mod: CPTII,S$GLB,, | Performed by: INTERNAL MEDICINE

## 2022-05-12 PROCEDURE — 99214 OFFICE O/P EST MOD 30 MIN: CPT | Mod: S$GLB,,, | Performed by: INTERNAL MEDICINE

## 2022-05-12 PROCEDURE — 1101F PT FALLS ASSESS-DOCD LE1/YR: CPT | Mod: CPTII,S$GLB,, | Performed by: INTERNAL MEDICINE

## 2022-05-12 PROCEDURE — 1159F PR MEDICATION LIST DOCUMENTED IN MEDICAL RECORD: ICD-10-PCS | Mod: CPTII,S$GLB,, | Performed by: INTERNAL MEDICINE

## 2022-05-12 PROCEDURE — 1160F RVW MEDS BY RX/DR IN RCRD: CPT | Mod: CPTII,S$GLB,, | Performed by: INTERNAL MEDICINE

## 2022-05-12 PROCEDURE — 1126F AMNT PAIN NOTED NONE PRSNT: CPT | Mod: CPTII,S$GLB,, | Performed by: INTERNAL MEDICINE

## 2022-05-12 PROCEDURE — 3075F PR MOST RECENT SYSTOLIC BLOOD PRESS GE 130-139MM HG: ICD-10-PCS | Mod: CPTII,S$GLB,, | Performed by: INTERNAL MEDICINE

## 2022-05-12 PROCEDURE — 1126F PR PAIN SEVERITY QUANTIFIED, NO PAIN PRESENT: ICD-10-PCS | Mod: CPTII,S$GLB,, | Performed by: INTERNAL MEDICINE

## 2022-05-12 PROCEDURE — 3288F PR FALLS RISK ASSESSMENT DOCUMENTED: ICD-10-PCS | Mod: CPTII,S$GLB,, | Performed by: INTERNAL MEDICINE

## 2022-05-12 PROCEDURE — 1160F PR REVIEW ALL MEDS BY PRESCRIBER/CLIN PHARMACIST DOCUMENTED: ICD-10-PCS | Mod: CPTII,S$GLB,, | Performed by: INTERNAL MEDICINE

## 2022-05-12 PROCEDURE — 3288F FALL RISK ASSESSMENT DOCD: CPT | Mod: CPTII,S$GLB,, | Performed by: INTERNAL MEDICINE

## 2022-05-12 PROCEDURE — 1159F MED LIST DOCD IN RCRD: CPT | Mod: CPTII,S$GLB,, | Performed by: INTERNAL MEDICINE

## 2022-05-12 NOTE — PROGRESS NOTES
[facility]    Chief Complaint  Chief Complaint   Patient presents with    Atrial Fibrillation       HPI:    Comes in early because of upcoming procedure for kidney stones.  Sounds like she is going to have cystoscopy not lithotripsy.  Some minor bleeding noted.  No pain.  Continues to have more palpitations than in the past.  Has been prescribed fluoxetine but has not yet started taking it for stress.    Medications  Current Outpatient Medications   Medication Sig Dispense Refill    apixaban (ELIQUIS) 5 mg Tab Take 1 tablet (5 mg total) by mouth 2 (two) times daily. 180 tablet 3    Ca comb no.1/vit D3/B6/FA/B12 (VITAMIN D3, CALCIUM CIT-PHOS, ORAL) Take by mouth.      DILT- mg CDCR TAKE 1 CAPSULE EVERY DAY 90 capsule 3    flecainide (TAMBOCOR) 50 MG Tab Take 1 tablet (50 mg total) by mouth every 12 (twelve) hours. 180 tablet 3    losartan (COZAAR) 100 MG tablet TAKE 1 TABLET EVERY DAY 90 tablet 3    multivitamin (THERAGRAN) per tablet Take 1 tablet by mouth once daily.      pravastatin (PRAVACHOL) 40 MG tablet Take 40 mg by mouth once daily.       topiramate (TOPAMAX) 25 MG tablet Take by mouth once daily.       No current facility-administered medications for this visit.        History  Past Medical History:   Diagnosis Date    Breast cancer 01/19/2017 6/2015: Right lumpectomy. Radiation. No chemotherapy.    Diastolic heart failure     Familial tremor     Paroxysmal atrial fibrillation     Supraventricular tachycardia      Past Surgical History:   Procedure Laterality Date    BREAST LUMPECTOMY Right      Social History     Socioeconomic History    Marital status:    Tobacco Use    Smoking status: Never Smoker    Smokeless tobacco: Never Used   Substance and Sexual Activity    Alcohol use: Not Currently    Drug use: Never     History reviewed. No pertinent family history.     Allergies  Review of patient's allergies indicates:  No Known Allergies    Review of  Systems   Review of Systems   Constitutional: Negative for malaise/fatigue, weight gain and weight loss.   Eyes: Negative for visual disturbance.   Cardiovascular: Positive for palpitations. Negative for chest pain, claudication, cyanosis, dyspnea on exertion, irregular heartbeat, leg swelling, near-syncope, orthopnea, paroxysmal nocturnal dyspnea and syncope.   Respiratory: Negative for cough, hemoptysis, shortness of breath, sleep disturbances due to breathing and wheezing.    Hematologic/Lymphatic: Negative for bleeding problem. Does not bruise/bleed easily.   Skin: Negative for poor wound healing.   Musculoskeletal: Negative for muscle cramps and myalgias.   Gastrointestinal: Negative for abdominal pain, anorexia, diarrhea, heartburn, hematemesis, hematochezia, melena, nausea and vomiting.   Genitourinary: Negative for hematuria and nocturia.   Neurological: Negative for excessive daytime sleepiness, dizziness, focal weakness, light-headedness and weakness.   Psychiatric/Behavioral: The patient is nervous/anxious.        Physical Exam  Vitals:    05/12/22 1151   BP: 132/74   Pulse: (!) 55     Wt Readings from Last 1 Encounters:   05/12/22 54.3 kg (119 lb 12.8 oz)     Physical Exam  Constitutional:       General: She is not in acute distress.     Appearance: She is not toxic-appearing or diaphoretic.   HENT:      Head: Normocephalic and atraumatic.   Eyes:      General: No scleral icterus.     Conjunctiva/sclera: Conjunctivae normal.   Neck:      Thyroid: No thyromegaly.      Vascular: No carotid bruit, hepatojugular reflux or JVD.      Trachea: Trachea normal.   Cardiovascular:      Rate and Rhythm: Normal rate and regular rhythm.  No extrasystoles are present.     Chest Wall: PMI is not displaced.      Pulses:           Carotid pulses are 2+ on the right side and 2+ on the left side.       Radial pulses are 2+ on the right side and 2+ on the left side.        Dorsalis pedis pulses are 2+ on the right side and  2+ on the left side.        Posterior tibial pulses are 2+ on the right side and 2+ on the left side.      Heart sounds: S1 normal and S2 normal. No murmur heard.    No S3 or S4 sounds.   Pulmonary:      Effort: No accessory muscle usage or respiratory distress.      Breath sounds: No decreased breath sounds, wheezing, rhonchi or rales.   Abdominal:      General: Bowel sounds are normal. There is no abdominal bruit.      Palpations: Abdomen is soft. There is no hepatomegaly, splenomegaly or pulsatile mass.      Tenderness: There is no abdominal tenderness.   Musculoskeletal:         General: No tenderness or deformity.      Right lower leg: No edema.      Left lower leg: No edema.   Skin:     General: Skin is warm and dry.      Coloration: Skin is not cyanotic or pale.      Nails: There is no clubbing.   Neurological:      General: No focal deficit present.      Mental Status: She is alert and oriented to person, place, and time.      Motor: Tremor present.   Psychiatric:         Speech: Speech normal.         Behavior: Behavior normal. Behavior is cooperative.         Labs  No visits with results within 6 Month(s) from this visit.   Latest known visit with results is:   Lab Visit on 08/17/2021   Component Date Value Ref Range Status    WBC 08/17/2021 9.21  3.90 - 12.70 K/uL Final    RBC 08/17/2021 3.85 (A) 4.00 - 5.40 M/uL Final    Hemoglobin 08/17/2021 13.2  12.0 - 16.0 g/dL Final    Hematocrit 08/17/2021 39.5  37.0 - 48.5 % Final    MCV 08/17/2021 103 (A) 82 - 98 fL Final    MCH 08/17/2021 34.3 (A) 27.0 - 31.0 pg Final    MCHC 08/17/2021 33.4  32.0 - 36.0 g/dL Final    RDW 08/17/2021 12.5  11.5 - 14.5 % Final    Platelets 08/17/2021 258  150 - 450 K/uL Final    MPV 08/17/2021 11.2  9.2 - 12.9 fL Final    Immature Granulocytes 08/17/2021 0.3  0.0 - 0.5 % Final    Gran # (ANC) 08/17/2021 4.8  1.8 - 7.7 K/uL Final    Immature Grans (Abs) 08/17/2021 0.03  0.00 - 0.04 K/uL Final    Comment: Mild elevation  in immature granulocytes is non specific and   can be seen in a variety of conditions including stress response,   acute inflammation, trauma and pregnancy. Correlation with other   laboratory and clinical findings is essential.      Lymph # 08/17/2021 3.2  1.0 - 4.8 K/uL Final    Mono # 08/17/2021 1.0  0.3 - 1.0 K/uL Final    Eos # 08/17/2021 0.1  0.0 - 0.5 K/uL Final    Baso # 08/17/2021 0.08  0.00 - 0.20 K/uL Final    nRBC 08/17/2021 0  0 /100 WBC Final    Gran % 08/17/2021 51.5  38.0 - 73.0 % Final    Lymph % 08/17/2021 35.2  18.0 - 48.0 % Final    Mono % 08/17/2021 11.1  4.0 - 15.0 % Final    Eosinophil % 08/17/2021 1.0  0.0 - 8.0 % Final    Basophil % 08/17/2021 0.9  0.0 - 1.9 % Final    Differential Method 08/17/2021 Automated   Final       Imaging  No results found.    Assessment  1. Paroxysmal atrial fibrillation  Attributes her symptomatic episodes distress.  Tolerating anticoagulation    2. Supraventricular tachycardia  Controlled    3. Chronic diastolic heart failure  Compensated    4. Essential hypertension  Controlled      Plan and Discussion    Will continue with present management.  Hopefully stress and renal stones can be treated soon.  Will see if her symptoms improve once toes a controlled.  If not, discussed the possibility of altering her medical regimen or referring for ablation.  She will think about that.  Reassured her that is okay to proceed with whatever is needed for her kidney stone.  She will let me know when definitive plans are made so that appropriate clearance can be sent.    The ASCVD Risk score (Deirdre DC Jr., et al., 2013) failed to calculate for the following reasons:    Cannot find a previous HDL lab    Cannot find a previous total cholesterol lab     Follow Up  Follow up in about 3 months (around 8/12/2022).      Vargas Norman MD

## 2022-08-26 ENCOUNTER — OFFICE VISIT (OUTPATIENT)
Dept: CARDIOLOGY | Facility: CLINIC | Age: 67
End: 2022-08-26
Payer: MEDICARE

## 2022-08-26 VITALS
HEART RATE: 52 BPM | SYSTOLIC BLOOD PRESSURE: 124 MMHG | DIASTOLIC BLOOD PRESSURE: 70 MMHG | WEIGHT: 121.06 LBS | BODY MASS INDEX: 22.14 KG/M2 | OXYGEN SATURATION: 98 %

## 2022-08-26 DIAGNOSIS — I72.8 SUPERIOR MESENTERIC ARTERY ANEURYSM: ICD-10-CM

## 2022-08-26 DIAGNOSIS — I10 ESSENTIAL HYPERTENSION: ICD-10-CM

## 2022-08-26 DIAGNOSIS — I48.0 PAROXYSMAL ATRIAL FIBRILLATION: Primary | ICD-10-CM

## 2022-08-26 DIAGNOSIS — I47.10 SUPRAVENTRICULAR TACHYCARDIA: ICD-10-CM

## 2022-08-26 DIAGNOSIS — I50.32 CHRONIC DIASTOLIC HEART FAILURE: ICD-10-CM

## 2022-08-26 PROCEDURE — 1101F PR PT FALLS ASSESS DOC 0-1 FALLS W/OUT INJ PAST YR: ICD-10-PCS | Mod: CPTII,S$GLB,, | Performed by: INTERNAL MEDICINE

## 2022-08-26 PROCEDURE — 1126F PR PAIN SEVERITY QUANTIFIED, NO PAIN PRESENT: ICD-10-PCS | Mod: CPTII,S$GLB,, | Performed by: INTERNAL MEDICINE

## 2022-08-26 PROCEDURE — 3008F PR BODY MASS INDEX (BMI) DOCUMENTED: ICD-10-PCS | Mod: CPTII,S$GLB,, | Performed by: INTERNAL MEDICINE

## 2022-08-26 PROCEDURE — 1160F RVW MEDS BY RX/DR IN RCRD: CPT | Mod: CPTII,S$GLB,, | Performed by: INTERNAL MEDICINE

## 2022-08-26 PROCEDURE — 99215 PR OFFICE/OUTPT VISIT, EST, LEVL V, 40-54 MIN: ICD-10-PCS | Mod: S$GLB,,, | Performed by: INTERNAL MEDICINE

## 2022-08-26 PROCEDURE — 1101F PT FALLS ASSESS-DOCD LE1/YR: CPT | Mod: CPTII,S$GLB,, | Performed by: INTERNAL MEDICINE

## 2022-08-26 PROCEDURE — 99215 OFFICE O/P EST HI 40 MIN: CPT | Mod: S$GLB,,, | Performed by: INTERNAL MEDICINE

## 2022-08-26 PROCEDURE — 3008F BODY MASS INDEX DOCD: CPT | Mod: CPTII,S$GLB,, | Performed by: INTERNAL MEDICINE

## 2022-08-26 PROCEDURE — 3288F FALL RISK ASSESSMENT DOCD: CPT | Mod: CPTII,S$GLB,, | Performed by: INTERNAL MEDICINE

## 2022-08-26 PROCEDURE — 4010F ACE/ARB THERAPY RXD/TAKEN: CPT | Mod: CPTII,S$GLB,, | Performed by: INTERNAL MEDICINE

## 2022-08-26 PROCEDURE — 99999 PR PBB SHADOW E&M-EST. PATIENT-LVL III: ICD-10-PCS | Mod: PBBFAC,,, | Performed by: INTERNAL MEDICINE

## 2022-08-26 PROCEDURE — 1160F PR REVIEW ALL MEDS BY PRESCRIBER/CLIN PHARMACIST DOCUMENTED: ICD-10-PCS | Mod: CPTII,S$GLB,, | Performed by: INTERNAL MEDICINE

## 2022-08-26 PROCEDURE — 4010F PR ACE/ARB THEARPY RXD/TAKEN: ICD-10-PCS | Mod: CPTII,S$GLB,, | Performed by: INTERNAL MEDICINE

## 2022-08-26 PROCEDURE — 3074F PR MOST RECENT SYSTOLIC BLOOD PRESSURE < 130 MM HG: ICD-10-PCS | Mod: CPTII,S$GLB,, | Performed by: INTERNAL MEDICINE

## 2022-08-26 PROCEDURE — 99999 PR PBB SHADOW E&M-EST. PATIENT-LVL III: CPT | Mod: PBBFAC,,, | Performed by: INTERNAL MEDICINE

## 2022-08-26 PROCEDURE — 1126F AMNT PAIN NOTED NONE PRSNT: CPT | Mod: CPTII,S$GLB,, | Performed by: INTERNAL MEDICINE

## 2022-08-26 PROCEDURE — 3074F SYST BP LT 130 MM HG: CPT | Mod: CPTII,S$GLB,, | Performed by: INTERNAL MEDICINE

## 2022-08-26 PROCEDURE — 3078F PR MOST RECENT DIASTOLIC BLOOD PRESSURE < 80 MM HG: ICD-10-PCS | Mod: CPTII,S$GLB,, | Performed by: INTERNAL MEDICINE

## 2022-08-26 PROCEDURE — 1159F MED LIST DOCD IN RCRD: CPT | Mod: CPTII,S$GLB,, | Performed by: INTERNAL MEDICINE

## 2022-08-26 PROCEDURE — 3078F DIAST BP <80 MM HG: CPT | Mod: CPTII,S$GLB,, | Performed by: INTERNAL MEDICINE

## 2022-08-26 PROCEDURE — 1159F PR MEDICATION LIST DOCUMENTED IN MEDICAL RECORD: ICD-10-PCS | Mod: CPTII,S$GLB,, | Performed by: INTERNAL MEDICINE

## 2022-08-26 PROCEDURE — 3288F PR FALLS RISK ASSESSMENT DOCUMENTED: ICD-10-PCS | Mod: CPTII,S$GLB,, | Performed by: INTERNAL MEDICINE

## 2022-08-26 RX ORDER — FLECAINIDE ACETATE 100 MG/1
100 TABLET ORAL EVERY 12 HOURS
Qty: 180 TABLET | Refills: 3 | Status: SHIPPED | OUTPATIENT
Start: 2022-08-26 | End: 2023-08-11

## 2022-08-26 RX ORDER — METOPROLOL TARTRATE 25 MG/1
25 TABLET, FILM COATED ORAL 2 TIMES DAILY PRN
Qty: 60 TABLET | Refills: 11 | Status: SHIPPED | OUTPATIENT
Start: 2022-08-26 | End: 2023-08-26

## 2022-08-26 RX ORDER — DILTIAZEM HYDROCHLORIDE 180 MG/1
180 CAPSULE, EXTENDED RELEASE ORAL DAILY
Qty: 90 CAPSULE | Refills: 3 | Status: SHIPPED | OUTPATIENT
Start: 2022-08-26 | End: 2023-01-06

## 2022-08-26 RX ORDER — FLUOXETINE 10 MG/1
10 CAPSULE ORAL DAILY
COMMUNITY
End: 2023-03-31

## 2022-08-26 NOTE — PROGRESS NOTES
[facility]    Chief Complaint  Chief Complaint   Patient presents with    Atrial Fibrillation       HPI:    Started therapy and fluoxetine.  Better from that aspect.  Still having episodes of atrial fibrillation but not as frequently.  Reports that her heart rate can get up into the 170s.  May last 1-2 hours.  Resolves without any specific intervention.  No symptoms other than the palpitations during these episodes.    Medications  Current Outpatient Medications   Medication Sig Dispense Refill    apixaban (ELIQUIS) 5 mg Tab Take 1 tablet (5 mg total) by mouth 2 (two) times daily. 180 tablet 3    Ca comb no.1/vit D3/B6/FA/B12 (VITAMIN D3, CALCIUM CIT-PHOS, ORAL) Take by mouth.      FLUoxetine 10 MG capsule Take 10 mg by mouth once daily.      losartan (COZAAR) 100 MG tablet TAKE 1 TABLET EVERY DAY 90 tablet 3    multivitamin (THERAGRAN) per tablet Take 1 tablet by mouth once daily.      pravastatin (PRAVACHOL) 40 MG tablet Take 40 mg by mouth once daily.       topiramate (TOPAMAX) 25 MG tablet Take by mouth once daily.      diltiaZEM (DILT-XR) 180 MG CDCR Take 1 capsule (180 mg total) by mouth once daily. 90 capsule 3    flecainide (TAMBOCOR) 100 MG Tab Take 1 tablet (100 mg total) by mouth every 12 (twelve) hours. 180 tablet 3    metoprolol tartrate (LOPRESSOR) 25 MG tablet Take 1 tablet (25 mg total) by mouth 2 (two) times daily as needed (elevated heart rate). 60 tablet 11     No current facility-administered medications for this visit.        History  Past Medical History:   Diagnosis Date    Breast cancer 01/19/2017 6/2015: Right lumpectomy. Radiation. No chemotherapy.    Diastolic heart failure     Familial tremor     Paroxysmal atrial fibrillation     Superior mesenteric artery aneurysm 12/2021    incidentally noted on CT    Supraventricular tachycardia      Past Surgical History:   Procedure Laterality Date    BREAST LUMPECTOMY Right      Social History     Socioeconomic  History    Marital status:    Tobacco Use    Smoking status: Never Smoker    Smokeless tobacco: Never Used   Substance and Sexual Activity    Alcohol use: Not Currently    Drug use: Never     History reviewed. No pertinent family history.     Allergies  Review of patient's allergies indicates:  No Known Allergies    Review of Systems   Review of Systems   Constitutional: Negative for malaise/fatigue, weight gain and weight loss.   Eyes: Negative for visual disturbance.   Cardiovascular: Positive for palpitations. Negative for chest pain, claudication, cyanosis, dyspnea on exertion, irregular heartbeat, leg swelling, near-syncope, orthopnea, paroxysmal nocturnal dyspnea and syncope.   Respiratory: Negative for cough, hemoptysis, shortness of breath, sleep disturbances due to breathing and wheezing.    Hematologic/Lymphatic: Negative for bleeding problem. Does not bruise/bleed easily.   Skin: Negative for poor wound healing.   Musculoskeletal: Negative for muscle cramps and myalgias.   Gastrointestinal: Negative for abdominal pain, anorexia, diarrhea, heartburn, hematemesis, hematochezia, melena, nausea and vomiting.   Genitourinary: Negative for hematuria and nocturia.   Neurological: Negative for excessive daytime sleepiness, dizziness, focal weakness, light-headedness and weakness.   Psychiatric/Behavioral: The patient is nervous/anxious.        Physical Exam  Vitals:    08/26/22 1143   BP: 124/70   Pulse: (!) 52     Wt Readings from Last 1 Encounters:   08/26/22 54.9 kg (121 lb 0.5 oz)     Physical Exam  Constitutional:       General: She is not in acute distress.     Appearance: She is not toxic-appearing or diaphoretic.   HENT:      Head: Normocephalic and atraumatic.   Eyes:      General: No scleral icterus.     Conjunctiva/sclera: Conjunctivae normal.   Neck:      Thyroid: No thyromegaly.      Vascular: No carotid bruit, hepatojugular reflux or JVD.      Trachea: Trachea normal.   Cardiovascular:       Rate and Rhythm: Normal rate and regular rhythm.  No extrasystoles are present.     Chest Wall: PMI is not displaced.      Pulses:           Carotid pulses are 2+ on the right side and 2+ on the left side.       Radial pulses are 2+ on the right side and 2+ on the left side.        Dorsalis pedis pulses are 2+ on the right side and 2+ on the left side.        Posterior tibial pulses are 2+ on the right side and 2+ on the left side.      Heart sounds: S1 normal and S2 normal. No murmur heard.    No S3 or S4 sounds.   Pulmonary:      Effort: No accessory muscle usage or respiratory distress.      Breath sounds: No decreased breath sounds, wheezing, rhonchi or rales.   Abdominal:      General: Bowel sounds are normal. There is no abdominal bruit.      Palpations: Abdomen is soft. There is no hepatomegaly, splenomegaly or pulsatile mass.      Tenderness: There is no abdominal tenderness.   Musculoskeletal:         General: No tenderness or deformity.      Right lower leg: No edema.      Left lower leg: No edema.   Skin:     General: Skin is warm and dry.      Coloration: Skin is not cyanotic or pale.      Nails: There is no clubbing.   Neurological:      General: No focal deficit present.      Mental Status: She is alert and oriented to person, place, and time.      Motor: Tremor present.   Psychiatric:         Speech: Speech normal.         Behavior: Behavior normal. Behavior is cooperative.         Labs  No visits with results within 6 Month(s) from this visit.   Latest known visit with results is:   Lab Visit on 08/17/2021   Component Date Value Ref Range Status    WBC 08/17/2021 9.21  3.90 - 12.70 K/uL Final    RBC 08/17/2021 3.85 (A) 4.00 - 5.40 M/uL Final    Hemoglobin 08/17/2021 13.2  12.0 - 16.0 g/dL Final    Hematocrit 08/17/2021 39.5  37.0 - 48.5 % Final    MCV 08/17/2021 103 (A) 82 - 98 fL Final    MCH 08/17/2021 34.3 (A) 27.0 - 31.0 pg Final    MCHC 08/17/2021 33.4  32.0 - 36.0 g/dL Final    RDW  08/17/2021 12.5  11.5 - 14.5 % Final    Platelets 08/17/2021 258  150 - 450 K/uL Final    MPV 08/17/2021 11.2  9.2 - 12.9 fL Final    Immature Granulocytes 08/17/2021 0.3  0.0 - 0.5 % Final    Gran # (ANC) 08/17/2021 4.8  1.8 - 7.7 K/uL Final    Immature Grans (Abs) 08/17/2021 0.03  0.00 - 0.04 K/uL Final    Comment: Mild elevation in immature granulocytes is non specific and   can be seen in a variety of conditions including stress response,   acute inflammation, trauma and pregnancy. Correlation with other   laboratory and clinical findings is essential.      Lymph # 08/17/2021 3.2  1.0 - 4.8 K/uL Final    Mono # 08/17/2021 1.0  0.3 - 1.0 K/uL Final    Eos # 08/17/2021 0.1  0.0 - 0.5 K/uL Final    Baso # 08/17/2021 0.08  0.00 - 0.20 K/uL Final    nRBC 08/17/2021 0  0 /100 WBC Final    Gran % 08/17/2021 51.5  38.0 - 73.0 % Final    Lymph % 08/17/2021 35.2  18.0 - 48.0 % Final    Mono % 08/17/2021 11.1  4.0 - 15.0 % Final    Eosinophil % 08/17/2021 1.0  0.0 - 8.0 % Final    Basophil % 08/17/2021 0.9  0.0 - 1.9 % Final    Differential Method 08/17/2021 Automated   Final       Imaging  No results found.    Assessment  1. Supraventricular tachycardia  Controlled    2. Paroxysmal atrial fibrillation  Continues to have symptomatic episodes  - Comprehensive Metabolic Panel; Future  - CBC Auto Differential; Future  - TSH; Future  - Magnesium; Future    3. Chronic diastolic heart failure  Compensated  - Lipid Panel; Future    4. Essential hypertension  Controlled    5. Superior mesenteric artery aneurysm  Followed by Dr. Oliveira.  No intervention needed      Plan and Discussion    Will increase flecainide to 100 mg twice daily.  Check electrocardiogram and baseline blood work next week.  Lopressor p.r.n..    The ASCVD Risk score ([place] YAHIR Jr., et al., 2013) failed to calculate for the following reasons:    Cannot find a previous HDL lab    Cannot find a previous total cholesterol lab     Follow Up  Follow  up in about 3 months (around 11/26/2022).      Vargas Norman MD

## 2022-09-28 LAB
ALBUMIN SERPL-MCNC: 3.8 G/DL (ref 3.6–5.1)
ALBUMIN/GLOB SERPL: 1.4 (CALC) (ref 1–2.5)
ALP SERPL-CCNC: 81 U/L (ref 37–153)
ALT SERPL-CCNC: 11 U/L (ref 6–29)
AST SERPL-CCNC: 18 U/L (ref 10–35)
BASOPHILS # BLD AUTO: 50 CELLS/UL (ref 0–200)
BASOPHILS NFR BLD AUTO: 0.9 %
BILIRUB SERPL-MCNC: 0.4 MG/DL (ref 0.2–1.2)
BUN SERPL-MCNC: 21 MG/DL (ref 7–25)
BUN/CREAT SERPL: NORMAL (CALC) (ref 6–22)
CALCIUM SERPL-MCNC: 8.9 MG/DL (ref 8.6–10.4)
CHLORIDE SERPL-SCNC: 108 MMOL/L (ref 98–110)
CHOLEST SERPL-MCNC: 130 MG/DL
CHOLEST/HDLC SERPL: 2.7 (CALC)
CO2 SERPL-SCNC: 26 MMOL/L (ref 20–32)
CREAT SERPL-MCNC: 0.84 MG/DL (ref 0.5–1.05)
EGFR: 76 ML/MIN/1.73M2
EOSINOPHIL # BLD AUTO: 72 CELLS/UL (ref 15–500)
EOSINOPHIL NFR BLD AUTO: 1.3 %
ERYTHROCYTE [DISTWIDTH] IN BLOOD BY AUTOMATED COUNT: 12.1 % (ref 11–15)
GLOBULIN SER CALC-MCNC: 2.8 G/DL (CALC) (ref 1.9–3.7)
GLUCOSE SERPL-MCNC: 89 MG/DL (ref 65–99)
HCT VFR BLD AUTO: 38.7 % (ref 35–45)
HDLC SERPL-MCNC: 48 MG/DL
HGB BLD-MCNC: 12.9 G/DL (ref 11.7–15.5)
LDLC SERPL CALC-MCNC: 68 MG/DL (CALC)
LYMPHOCYTES # BLD AUTO: 1612 CELLS/UL (ref 850–3900)
LYMPHOCYTES NFR BLD AUTO: 29.3 %
MAGNESIUM SERPL-MCNC: 2.1 MG/DL (ref 1.5–2.5)
MCH RBC QN AUTO: 34.1 PG (ref 27–33)
MCHC RBC AUTO-ENTMCNC: 33.3 G/DL (ref 32–36)
MCV RBC AUTO: 102.4 FL (ref 80–100)
MONOCYTES # BLD AUTO: 484 CELLS/UL (ref 200–950)
MONOCYTES NFR BLD AUTO: 8.8 %
NEUTROPHILS # BLD AUTO: 3284 CELLS/UL (ref 1500–7800)
NEUTROPHILS NFR BLD AUTO: 59.7 %
NONHDLC SERPL-MCNC: 82 MG/DL (CALC)
PLATELET # BLD AUTO: 237 THOUSAND/UL (ref 140–400)
PMV BLD REES-ECKER: 10.9 FL (ref 7.5–12.5)
POTASSIUM SERPL-SCNC: 3.8 MMOL/L (ref 3.5–5.3)
PROT SERPL-MCNC: 6.6 G/DL (ref 6.1–8.1)
RBC # BLD AUTO: 3.78 MILLION/UL (ref 3.8–5.1)
SODIUM SERPL-SCNC: 141 MMOL/L (ref 135–146)
TRIGL SERPL-MCNC: 61 MG/DL
TSH SERPL-ACNC: 1.42 MIU/L (ref 0.4–4.5)
WBC # BLD AUTO: 5.5 THOUSAND/UL (ref 3.8–10.8)

## 2022-11-18 ENCOUNTER — LAB VISIT (OUTPATIENT)
Dept: LAB | Facility: OTHER | Age: 67
End: 2022-11-18
Attending: INTERNAL MEDICINE
Payer: MEDICARE

## 2022-11-18 ENCOUNTER — OFFICE VISIT (OUTPATIENT)
Dept: CARDIOLOGY | Facility: CLINIC | Age: 67
End: 2022-11-18
Payer: MEDICARE

## 2022-11-18 VITALS
OXYGEN SATURATION: 97 % | HEART RATE: 55 BPM | WEIGHT: 113 LBS | BODY MASS INDEX: 20.67 KG/M2 | SYSTOLIC BLOOD PRESSURE: 172 MMHG | DIASTOLIC BLOOD PRESSURE: 80 MMHG

## 2022-11-18 DIAGNOSIS — I10 PRIMARY HYPERTENSION: ICD-10-CM

## 2022-11-18 DIAGNOSIS — I47.10 SUPRAVENTRICULAR TACHYCARDIA: ICD-10-CM

## 2022-11-18 DIAGNOSIS — I48.0 PAROXYSMAL ATRIAL FIBRILLATION: Primary | ICD-10-CM

## 2022-11-18 DIAGNOSIS — I48.0 PAROXYSMAL ATRIAL FIBRILLATION: ICD-10-CM

## 2022-11-18 DIAGNOSIS — I72.8 SUPERIOR MESENTERIC ARTERY ANEURYSM: ICD-10-CM

## 2022-11-18 DIAGNOSIS — I50.32 CHRONIC DIASTOLIC HEART FAILURE: ICD-10-CM

## 2022-11-18 LAB
ALBUMIN SERPL BCP-MCNC: 3.8 G/DL (ref 3.5–5.2)
ALP SERPL-CCNC: 81 U/L (ref 55–135)
ALT SERPL W/O P-5'-P-CCNC: 11 U/L (ref 10–44)
ANION GAP SERPL CALC-SCNC: 10 MMOL/L (ref 8–16)
AST SERPL-CCNC: 19 U/L (ref 10–40)
BASOPHILS # BLD AUTO: 0.05 K/UL (ref 0–0.2)
BASOPHILS NFR BLD: 0.8 % (ref 0–1.9)
BILIRUB SERPL-MCNC: 0.4 MG/DL (ref 0.1–1)
BUN SERPL-MCNC: 15 MG/DL (ref 8–23)
CALCIUM SERPL-MCNC: 9.5 MG/DL (ref 8.7–10.5)
CHLORIDE SERPL-SCNC: 111 MMOL/L (ref 95–110)
CO2 SERPL-SCNC: 23 MMOL/L (ref 23–29)
CREAT SERPL-MCNC: 0.8 MG/DL (ref 0.5–1.4)
DIFFERENTIAL METHOD: ABNORMAL
EOSINOPHIL # BLD AUTO: 0.1 K/UL (ref 0–0.5)
EOSINOPHIL NFR BLD: 1 % (ref 0–8)
ERYTHROCYTE [DISTWIDTH] IN BLOOD BY AUTOMATED COUNT: 12.8 % (ref 11.5–14.5)
EST. GFR  (NO RACE VARIABLE): >60 ML/MIN/1.73 M^2
GLUCOSE SERPL-MCNC: 96 MG/DL (ref 70–110)
HCT VFR BLD AUTO: 40 % (ref 37–48.5)
HGB BLD-MCNC: 13.2 G/DL (ref 12–16)
IMM GRANULOCYTES # BLD AUTO: 0.01 K/UL (ref 0–0.04)
IMM GRANULOCYTES NFR BLD AUTO: 0.2 % (ref 0–0.5)
LYMPHOCYTES # BLD AUTO: 1.9 K/UL (ref 1–4.8)
LYMPHOCYTES NFR BLD: 30.5 % (ref 18–48)
MAGNESIUM SERPL-MCNC: 1.9 MG/DL (ref 1.6–2.6)
MCH RBC QN AUTO: 34.2 PG (ref 27–31)
MCHC RBC AUTO-ENTMCNC: 33 G/DL (ref 32–36)
MCV RBC AUTO: 104 FL (ref 82–98)
MONOCYTES # BLD AUTO: 0.6 K/UL (ref 0.3–1)
MONOCYTES NFR BLD: 9.5 % (ref 4–15)
NEUTROPHILS # BLD AUTO: 3.5 K/UL (ref 1.8–7.7)
NEUTROPHILS NFR BLD: 58 % (ref 38–73)
NRBC BLD-RTO: 0 /100 WBC
PLATELET # BLD AUTO: 237 K/UL (ref 150–450)
PMV BLD AUTO: 10.7 FL (ref 9.2–12.9)
POTASSIUM SERPL-SCNC: 4.1 MMOL/L (ref 3.5–5.1)
PROT SERPL-MCNC: 7 G/DL (ref 6–8.4)
RBC # BLD AUTO: 3.86 M/UL (ref 4–5.4)
SODIUM SERPL-SCNC: 144 MMOL/L (ref 136–145)
WBC # BLD AUTO: 6.09 K/UL (ref 3.9–12.7)

## 2022-11-18 PROCEDURE — 3008F BODY MASS INDEX DOCD: CPT | Mod: CPTII,S$GLB,, | Performed by: INTERNAL MEDICINE

## 2022-11-18 PROCEDURE — 99999 PR PBB SHADOW E&M-EST. PATIENT-LVL IV: CPT | Mod: PBBFAC,,, | Performed by: INTERNAL MEDICINE

## 2022-11-18 PROCEDURE — 1159F PR MEDICATION LIST DOCUMENTED IN MEDICAL RECORD: ICD-10-PCS | Mod: CPTII,S$GLB,, | Performed by: INTERNAL MEDICINE

## 2022-11-18 PROCEDURE — 3079F DIAST BP 80-89 MM HG: CPT | Mod: CPTII,S$GLB,, | Performed by: INTERNAL MEDICINE

## 2022-11-18 PROCEDURE — 1101F PT FALLS ASSESS-DOCD LE1/YR: CPT | Mod: CPTII,S$GLB,, | Performed by: INTERNAL MEDICINE

## 2022-11-18 PROCEDURE — 4010F PR ACE/ARB THEARPY RXD/TAKEN: ICD-10-PCS | Mod: CPTII,S$GLB,, | Performed by: INTERNAL MEDICINE

## 2022-11-18 PROCEDURE — 99999 PR PBB SHADOW E&M-EST. PATIENT-LVL IV: ICD-10-PCS | Mod: PBBFAC,,, | Performed by: INTERNAL MEDICINE

## 2022-11-18 PROCEDURE — 3077F SYST BP >= 140 MM HG: CPT | Mod: CPTII,S$GLB,, | Performed by: INTERNAL MEDICINE

## 2022-11-18 PROCEDURE — 1126F AMNT PAIN NOTED NONE PRSNT: CPT | Mod: CPTII,S$GLB,, | Performed by: INTERNAL MEDICINE

## 2022-11-18 PROCEDURE — 3077F PR MOST RECENT SYSTOLIC BLOOD PRESSURE >= 140 MM HG: ICD-10-PCS | Mod: CPTII,S$GLB,, | Performed by: INTERNAL MEDICINE

## 2022-11-18 PROCEDURE — 93005 ELECTROCARDIOGRAM TRACING: CPT

## 2022-11-18 PROCEDURE — 93010 ELECTROCARDIOGRAM REPORT: CPT | Mod: S$GLB,,, | Performed by: INTERNAL MEDICINE

## 2022-11-18 PROCEDURE — 99215 PR OFFICE/OUTPT VISIT, EST, LEVL V, 40-54 MIN: ICD-10-PCS | Mod: S$GLB,,, | Performed by: INTERNAL MEDICINE

## 2022-11-18 PROCEDURE — 4010F ACE/ARB THERAPY RXD/TAKEN: CPT | Mod: CPTII,S$GLB,, | Performed by: INTERNAL MEDICINE

## 2022-11-18 PROCEDURE — 1101F PR PT FALLS ASSESS DOC 0-1 FALLS W/OUT INJ PAST YR: ICD-10-PCS | Mod: CPTII,S$GLB,, | Performed by: INTERNAL MEDICINE

## 2022-11-18 PROCEDURE — 99215 OFFICE O/P EST HI 40 MIN: CPT | Mod: S$GLB,,, | Performed by: INTERNAL MEDICINE

## 2022-11-18 PROCEDURE — 36415 COLL VENOUS BLD VENIPUNCTURE: CPT | Performed by: INTERNAL MEDICINE

## 2022-11-18 PROCEDURE — 3008F PR BODY MASS INDEX (BMI) DOCUMENTED: ICD-10-PCS | Mod: CPTII,S$GLB,, | Performed by: INTERNAL MEDICINE

## 2022-11-18 PROCEDURE — 1160F RVW MEDS BY RX/DR IN RCRD: CPT | Mod: CPTII,S$GLB,, | Performed by: INTERNAL MEDICINE

## 2022-11-18 PROCEDURE — 1160F PR REVIEW ALL MEDS BY PRESCRIBER/CLIN PHARMACIST DOCUMENTED: ICD-10-PCS | Mod: CPTII,S$GLB,, | Performed by: INTERNAL MEDICINE

## 2022-11-18 PROCEDURE — 3079F PR MOST RECENT DIASTOLIC BLOOD PRESSURE 80-89 MM HG: ICD-10-PCS | Mod: CPTII,S$GLB,, | Performed by: INTERNAL MEDICINE

## 2022-11-18 PROCEDURE — 1159F MED LIST DOCD IN RCRD: CPT | Mod: CPTII,S$GLB,, | Performed by: INTERNAL MEDICINE

## 2022-11-18 PROCEDURE — 3288F PR FALLS RISK ASSESSMENT DOCUMENTED: ICD-10-PCS | Mod: CPTII,S$GLB,, | Performed by: INTERNAL MEDICINE

## 2022-11-18 PROCEDURE — 85025 COMPLETE CBC W/AUTO DIFF WBC: CPT | Performed by: INTERNAL MEDICINE

## 2022-11-18 PROCEDURE — 1126F PR PAIN SEVERITY QUANTIFIED, NO PAIN PRESENT: ICD-10-PCS | Mod: CPTII,S$GLB,, | Performed by: INTERNAL MEDICINE

## 2022-11-18 PROCEDURE — 3288F FALL RISK ASSESSMENT DOCD: CPT | Mod: CPTII,S$GLB,, | Performed by: INTERNAL MEDICINE

## 2022-11-18 PROCEDURE — 83735 ASSAY OF MAGNESIUM: CPT | Performed by: INTERNAL MEDICINE

## 2022-11-18 PROCEDURE — 93010 EKG 12-LEAD: ICD-10-PCS | Mod: S$GLB,,, | Performed by: INTERNAL MEDICINE

## 2022-11-18 PROCEDURE — 80053 COMPREHEN METABOLIC PANEL: CPT | Performed by: INTERNAL MEDICINE

## 2022-11-18 RX ORDER — HYDROCHLOROTHIAZIDE 12.5 MG/1
12.5 TABLET ORAL DAILY
Qty: 90 TABLET | Refills: 3 | Status: SHIPPED | OUTPATIENT
Start: 2022-11-18 | End: 2023-03-31

## 2022-11-18 NOTE — PROGRESS NOTES
[facility]    Chief Complaint  Chief Complaint   Patient presents with    Atrial Fibrillation       HPI:    Presents for routine follow-up.  In the 2nd half of October, she had been feeling bad for about 3 days.  She then woke up 1 morning with numbness and clumsiness of her left hand.  She also had a funny feeling in her left leg.  She was able to walk.  She was dropping things when she tried to pick them up with her left hand.  She did not seek medical attention.  Slowly those symptoms have improved.  But she does not feel back to baseline.  Palpitations related atrial fibrillation have been well controlled since increasing flecainide dose.  Has not had to take any metoprolol which she uses on an as-needed basis.    Medications  Current Outpatient Medications   Medication Sig Dispense Refill    apixaban (ELIQUIS) 5 mg Tab Take 1 tablet (5 mg total) by mouth 2 (two) times daily. 180 tablet 3    apixaban (ELIQUIS) 5 mg Tab Take 1 tablet (5 mg total) by mouth 2 (two) times daily. 60 tablet 0    Ca comb no.1/vit D3/B6/FA/B12 (VITAMIN D3, CALCIUM CIT-PHOS, ORAL) Take by mouth.      diltiaZEM (DILT-XR) 180 MG CDCR Take 1 capsule (180 mg total) by mouth once daily. 90 capsule 3    flecainide (TAMBOCOR) 100 MG Tab Take 1 tablet (100 mg total) by mouth every 12 (twelve) hours. 180 tablet 3    FLUoxetine 10 MG capsule Take 10 mg by mouth once daily.      hydroCHLOROthiazide (HYDRODIURIL) 12.5 MG Tab Take 1 tablet (12.5 mg total) by mouth once daily. 90 tablet 3    losartan (COZAAR) 100 MG tablet TAKE 1 TABLET EVERY DAY 90 tablet 3    metoprolol tartrate (LOPRESSOR) 25 MG tablet Take 1 tablet (25 mg total) by mouth 2 (two) times daily as needed (elevated heart rate). 60 tablet 11    multivitamin (THERAGRAN) per tablet Take 1 tablet by mouth once daily.      pravastatin (PRAVACHOL) 40 MG tablet Take 40 mg by mouth once daily.       topiramate (TOPAMAX) 25 MG tablet Take by mouth once daily.       No current  facility-administered medications for this visit.        History  Past Medical History:   Diagnosis Date    Breast cancer 01/19/2017 6/2015: Right lumpectomy. Radiation. No chemotherapy.    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 No pertinent family history.     Allergies  Review of patient's allergies indicates:  No Known Allergies    Review of Systems   Review of Systems   Constitutional: Negative for malaise/fatigue, weight gain and weight loss.   Eyes:  Negative for visual disturbance.   Cardiovascular:  Negative for chest pain, claudication, cyanosis, dyspnea on exertion, irregular heartbeat, leg swelling, near-syncope, orthopnea, palpitations, paroxysmal nocturnal dyspnea and syncope.   Respiratory:  Negative for cough, hemoptysis, shortness of breath, sleep disturbances due to breathing and wheezing.    Hematologic/Lymphatic: Negative for bleeding problem. Does not bruise/bleed easily.   Skin:  Negative for poor wound healing.   Musculoskeletal:  Negative for muscle cramps and myalgias.   Gastrointestinal:  Negative for abdominal pain, anorexia, diarrhea, heartburn, hematemesis, hematochezia, melena, nausea and vomiting.   Genitourinary:  Negative for hematuria and nocturia.   Neurological:  Positive for focal weakness. Negative for excessive daytime sleepiness, dizziness, light-headedness and weakness.   Psychiatric/Behavioral:  The patient is nervous/anxious.      Physical Exam  Vitals:    11/18/22 1133   BP: (!) 172/80   Pulse: (!) 55      Wt Readings from Last 1 Encounters:   11/18/22 51.3 kg (113 lb)     Physical Exam  Constitutional:       General: She is not in acute distress.     Appearance: She is not toxic-appearing or diaphoretic.   HENT:      Head: Normocephalic and atraumatic.   Eyes:      General: No scleral icterus.     Conjunctiva/sclera: Conjunctivae normal.   Neck:      Thyroid: No thyromegaly.      Vascular: No carotid bruit, hepatojugular reflux or JVD.      Trachea: Trachea normal.   Cardiovascular:      Rate and Rhythm: Normal rate and regular rhythm. No extrasystoles are present.     Chest Wall: PMI is not displaced.      Pulses:           Carotid pulses are 2+ on the right side and 2+ on the left side.       Radial pulses are 2+ on the right side and 2+ on the left side.        Dorsalis pedis pulses are 2+ on the right side and 2+ on the left side.        Posterior tibial pulses are 2+ on the right side and 2+ on the left side.      Heart sounds: S1 normal and S2 normal. No murmur heard.    No S3 or S4 sounds.   Pulmonary:      Effort: No accessory muscle usage or respiratory distress.      Breath sounds: No decreased breath sounds, wheezing, rhonchi or rales.   Abdominal:      General: Bowel sounds are normal. There is no abdominal bruit.      Palpations: Abdomen is soft. There is no hepatomegaly, splenomegaly or pulsatile mass.      Tenderness: There is no abdominal tenderness.   Musculoskeletal:         General: No tenderness or deformity.      Right lower leg: No edema.      Left lower leg: No edema.   Skin:     General: Skin is warm and dry.      Coloration: Skin is not cyanotic or pale.      Nails: There is no clubbing.   Neurological:      General: No focal deficit present.      Mental Status: She is alert and oriented to person, place, and time.      Motor: Tremor present.   Psychiatric:         Speech: Speech normal.         Behavior: Behavior normal. Behavior is cooperative.       Labs  Lab Visit on 11/18/2022    Component Date Value Ref Range Status    Sodium 11/18/2022 144  136 - 145 mmol/L Final    Potassium 11/18/2022 4.1  3.5 - 5.1 mmol/L Final    Chloride 11/18/2022 111 (H)  95 - 110 mmol/L Final    CO2 11/18/2022 23  23 - 29 mmol/L Final    Glucose 11/18/2022 96  70 - 110 mg/dL Final    BUN 11/18/2022 15  8 - 23 mg/dL Final    Creatinine 11/18/2022 0.8  0.5 - 1.4 mg/dL Final    Calcium 11/18/2022 9.5  8.7 - 10.5 mg/dL Final    Total Protein 11/18/2022 7.0  6.0 - 8.4 g/dL Final    Albumin 11/18/2022 3.8  3.5 - 5.2 g/dL Final    Total Bilirubin 11/18/2022 0.4  0.1 - 1.0 mg/dL Final    Comment: For infants and newborns, interpretation of results should be based  on gestational age, weight and in agreement with clinical  observations.    Premature Infant recommended reference ranges:  Up to 24 hours.............<8.0 mg/dL  Up to 48 hours............<12.0 mg/dL  3-5 days..................<15.0 mg/dL  6-29 days.................<15.0 mg/dL      Alkaline Phosphatase 11/18/2022 81  55 - 135 U/L Final    AST 11/18/2022 19  10 - 40 U/L Final    ALT 11/18/2022 11  10 - 44 U/L Final    Anion Gap 11/18/2022 10  8 - 16 mmol/L Final    eGFR 11/18/2022 >60  >60 mL/min/1.73 m^2 Final    Magnesium 11/18/2022 1.9  1.6 - 2.6 mg/dL Final    WBC 11/18/2022 6.09  3.90 - 12.70 K/uL Final    RBC 11/18/2022 3.86 (L)  4.00 - 5.40 M/uL Final    Hemoglobin 11/18/2022 13.2  12.0 - 16.0 g/dL Final    Hematocrit 11/18/2022 40.0  37.0 - 48.5 % Final    MCV 11/18/2022 104 (H)  82 - 98 fL Final    MCH 11/18/2022 34.2 (H)  27.0 - 31.0 pg Final    MCHC 11/18/2022 33.0  32.0 - 36.0 g/dL Final    RDW 11/18/2022 12.8  11.5 - 14.5 % Final    Platelets 11/18/2022 237  150 - 450 K/uL Final    MPV 11/18/2022 10.7  9.2 - 12.9 fL Final    Immature Granulocytes 11/18/2022 0.2  0.0 - 0.5 % Final    Gran # (ANC) 11/18/2022 3.5  1.8 - 7.7 K/uL Final    Immature Grans (Abs) 11/18/2022 0.01  0.00 - 0.04 K/uL Final    Comment: Mild elevation in immature granulocytes is  non specific and   can be seen in a variety of conditions including stress response,   acute inflammation, trauma and pregnancy. Correlation with other   laboratory and clinical findings is essential.      Lymph # 11/18/2022 1.9  1.0 - 4.8 K/uL Final    Mono # 11/18/2022 0.6  0.3 - 1.0 K/uL Final    Eos # 11/18/2022 0.1  0.0 - 0.5 K/uL Final    Baso # 11/18/2022 0.05  0.00 - 0.20 K/uL Final    nRBC 11/18/2022 0  0 /100 WBC Final    Gran % 11/18/2022 58.0  38.0 - 73.0 % Final    Lymph % 11/18/2022 30.5  18.0 - 48.0 % Final    Mono % 11/18/2022 9.5  4.0 - 15.0 % Final    Eosinophil % 11/18/2022 1.0  0.0 - 8.0 % Final    Basophil % 11/18/2022 0.8  0.0 - 1.9 % Final    Differential Method 11/18/2022 Automated   Final   Orders Only on 09/27/2022   Component Date Value Ref Range Status    Cholesterol 09/27/2022 130  <200 mg/dL Final    HDL 09/27/2022 48 (L)  > OR = 50 mg/dL Final    Triglycerides 09/27/2022 61  <150 mg/dL Final    LDL Cholesterol 09/27/2022 68  mg/dL (calc) Final    Comment: Reference range: <100     Desirable range <100 mg/dL for primary prevention;    <70 mg/dL for patients with CHD or diabetic patients   with > or = 2 CHD risk factors.     LDL-C is now calculated using the Eren-Rao   calculation, which is a validated novel method providing   better accuracy than the Friedewald equation in the   estimation of LDL-C.   Eren GAUTAM et al. JALEN. 2013;310(19): 2166-7462   (http://education.[employer]/faq/HNG571)      HDL/Cholesterol Ratio 09/27/2022 2.7  <5.0 (calc) Final    Non HDL Chol. (LDL+VLDL) 09/27/2022 82  <130 mg/dL (calc) Final    Comment: For patients with diabetes plus 1 major ASCVD risk   factor, treating to a non-HDL-C goal of <100 mg/dL   (LDL-C of <70 mg/dL) is considered a therapeutic   option.      Magnesium 09/27/2022 2.1  1.5 - 2.5 mg/dL Final    Glucose 09/27/2022 89  65 - 99 mg/dL Final    Comment:               Fasting reference interval         BUN 09/27/2022 21  7 - 25  mg/dL Final    Creatinine 09/27/2022 0.84  0.50 - 1.05 mg/dL Final    eGFR 09/27/2022 76  > OR = 60 mL/min/1.73m2 Final    Comment: The eGFR is based on the CKD-EPI 2021 equation. To calculate   the new eGFR from a previous Creatinine or Cystatin C  result, go to https://www.kidney.org/professionals/  kdoqi/gfr%5Fcalculator      BUN/Creatinine Ratio 09/27/2022 NOT APPLICABLE  6 - 22 (calc) Final    Sodium 09/27/2022 141  135 - 146 mmol/L Final    Potassium 09/27/2022 3.8  3.5 - 5.3 mmol/L Final    Chloride 09/27/2022 108  98 - 110 mmol/L Final    CO2 09/27/2022 26  20 - 32 mmol/L Final    Calcium 09/27/2022 8.9  8.6 - 10.4 mg/dL Final    Total Protein 09/27/2022 6.6  6.1 - 8.1 g/dL Final    Albumin 09/27/2022 3.8  3.6 - 5.1 g/dL Final    Globulin, Total 09/27/2022 2.8  1.9 - 3.7 g/dL (calc) Final    Albumin/Globulin Ratio 09/27/2022 1.4  1.0 - 2.5 (calc) Final    Total Bilirubin 09/27/2022 0.4  0.2 - 1.2 mg/dL Final    Alkaline Phosphatase 09/27/2022 81  37 - 153 U/L Final    AST 09/27/2022 18  10 - 35 U/L Final    ALT 09/27/2022 11  6 - 29 U/L Final    WBC 09/27/2022 5.5  3.8 - 10.8 Thousand/uL Final    RBC 09/27/2022 3.78 (L)  3.80 - 5.10 Million/uL Final    Hemoglobin 09/27/2022 12.9  11.7 - 15.5 g/dL Final    Hematocrit 09/27/2022 38.7  35.0 - 45.0 % Final    MCV 09/27/2022 102.4 (H)  80.0 - 100.0 fL Final    MCH 09/27/2022 34.1 (H)  27.0 - 33.0 pg Final    MCHC 09/27/2022 33.3  32.0 - 36.0 g/dL Final    RDW 09/27/2022 12.1  11.0 - 15.0 % Final    Platelets 09/27/2022 237  140 - 400 Thousand/uL Final    MPV 09/27/2022 10.9  7.5 - 12.5 fL Final    Neutrophils, Abs 09/27/2022 3,284  1,500 - 7,800 cells/uL Final    Lymph # 09/27/2022 1,612  850 - 3,900 cells/uL Final    Mono # 09/27/2022 484  200 - 950 cells/uL Final    Eos # 09/27/2022 72  15 - 500 cells/uL Final    Baso # 09/27/2022 50  0 - 200 cells/uL Final    Neutrophils Relative 09/27/2022 59.7  % Final    Lymph % 09/27/2022 29.3  % Final    Mono % 09/27/2022  8.8  % Final    Eosinophil % 09/27/2022 1.3  % Final    Basophil % 09/27/2022 0.9  % Final    TSH 09/27/2022 1.42  0.40 - 4.50 mIU/L Final       Imaging  No results found.    Assessment  1. Paroxysmal atrial fibrillation  Well controlled and tolerating anticoagulation  - IN OFFICE EKG 12-LEAD (to Muse)  - Comprehensive Metabolic Panel; Future  - Magnesium; Future  - CBC Auto Differential; Future    2. Supraventricular tachycardia  Controlled    3. Chronic diastolic heart failure  Compensated    4. Primary hypertension  Uncontrolled    5. Superior mesenteric artery aneurysm  Followed at [facility]      Plan and Discussion    We discussed that her symptoms are concerning for a TIA.  She also reports that her tremor has gotten worse.  Needs to follow-up with her neurologist.  Will add hydrochlorothiazide 12.5 mg once daily to help with blood pressure control.  She will start monitoring her blood pressure at home.  If it is not improved within the next week or 2, she will let us know and plan to increase hydrochlorothiazide to 25 mg daily.    The 10-year ASCVD risk score (Ange DK, et al., 2019) is: 15.7%    Values used to calculate the score:      Age: 67 years      Sex: Female      Is Non- : No      Diabetic: No      Tobacco smoker: No      Systolic Blood Pressure: 172 mmHg      Is BP treated: Yes      HDL Cholesterol: 48 mg/dL      Total Cholesterol: 130 mg/dL     Follow Up  Follow up in about 6 weeks (around 12/30/2022).      Vargas Norman MD

## 2023-01-06 ENCOUNTER — OFFICE VISIT (OUTPATIENT)
Dept: CARDIOLOGY | Facility: CLINIC | Age: 68
End: 2023-01-06
Payer: MEDICARE

## 2023-01-06 VITALS
SYSTOLIC BLOOD PRESSURE: 150 MMHG | DIASTOLIC BLOOD PRESSURE: 76 MMHG | WEIGHT: 113.38 LBS | BODY MASS INDEX: 20.74 KG/M2

## 2023-01-06 DIAGNOSIS — I47.10 SUPRAVENTRICULAR TACHYCARDIA: ICD-10-CM

## 2023-01-06 DIAGNOSIS — I10 PRIMARY HYPERTENSION: ICD-10-CM

## 2023-01-06 DIAGNOSIS — I50.32 CHRONIC DIASTOLIC HEART FAILURE: ICD-10-CM

## 2023-01-06 DIAGNOSIS — I72.8 SUPERIOR MESENTERIC ARTERY ANEURYSM: ICD-10-CM

## 2023-01-06 DIAGNOSIS — I48.0 PAROXYSMAL ATRIAL FIBRILLATION: Primary | ICD-10-CM

## 2023-01-06 PROCEDURE — 1159F PR MEDICATION LIST DOCUMENTED IN MEDICAL RECORD: ICD-10-PCS | Mod: CPTII,S$GLB,, | Performed by: INTERNAL MEDICINE

## 2023-01-06 PROCEDURE — 3077F PR MOST RECENT SYSTOLIC BLOOD PRESSURE >= 140 MM HG: ICD-10-PCS | Mod: CPTII,S$GLB,, | Performed by: INTERNAL MEDICINE

## 2023-01-06 PROCEDURE — 3078F PR MOST RECENT DIASTOLIC BLOOD PRESSURE < 80 MM HG: ICD-10-PCS | Mod: CPTII,S$GLB,, | Performed by: INTERNAL MEDICINE

## 2023-01-06 PROCEDURE — 3288F FALL RISK ASSESSMENT DOCD: CPT | Mod: CPTII,S$GLB,, | Performed by: INTERNAL MEDICINE

## 2023-01-06 PROCEDURE — 1160F PR REVIEW ALL MEDS BY PRESCRIBER/CLIN PHARMACIST DOCUMENTED: ICD-10-PCS | Mod: CPTII,S$GLB,, | Performed by: INTERNAL MEDICINE

## 2023-01-06 PROCEDURE — 1101F PR PT FALLS ASSESS DOC 0-1 FALLS W/OUT INJ PAST YR: ICD-10-PCS | Mod: CPTII,S$GLB,, | Performed by: INTERNAL MEDICINE

## 2023-01-06 PROCEDURE — 93010 EKG 12-LEAD: ICD-10-PCS | Mod: S$GLB,,, | Performed by: INTERNAL MEDICINE

## 2023-01-06 PROCEDURE — 99214 OFFICE O/P EST MOD 30 MIN: CPT | Mod: S$GLB,,, | Performed by: INTERNAL MEDICINE

## 2023-01-06 PROCEDURE — 1126F AMNT PAIN NOTED NONE PRSNT: CPT | Mod: CPTII,S$GLB,, | Performed by: INTERNAL MEDICINE

## 2023-01-06 PROCEDURE — 3288F PR FALLS RISK ASSESSMENT DOCUMENTED: ICD-10-PCS | Mod: CPTII,S$GLB,, | Performed by: INTERNAL MEDICINE

## 2023-01-06 PROCEDURE — 3078F DIAST BP <80 MM HG: CPT | Mod: CPTII,S$GLB,, | Performed by: INTERNAL MEDICINE

## 2023-01-06 PROCEDURE — 99999 PR PBB SHADOW E&M-EST. PATIENT-LVL III: CPT | Mod: PBBFAC,,, | Performed by: INTERNAL MEDICINE

## 2023-01-06 PROCEDURE — 1101F PT FALLS ASSESS-DOCD LE1/YR: CPT | Mod: CPTII,S$GLB,, | Performed by: INTERNAL MEDICINE

## 2023-01-06 PROCEDURE — 3008F PR BODY MASS INDEX (BMI) DOCUMENTED: ICD-10-PCS | Mod: CPTII,S$GLB,, | Performed by: INTERNAL MEDICINE

## 2023-01-06 PROCEDURE — 99214 PR OFFICE/OUTPT VISIT, EST, LEVL IV, 30-39 MIN: ICD-10-PCS | Mod: S$GLB,,, | Performed by: INTERNAL MEDICINE

## 2023-01-06 PROCEDURE — 99999 PR PBB SHADOW E&M-EST. PATIENT-LVL III: ICD-10-PCS | Mod: PBBFAC,,, | Performed by: INTERNAL MEDICINE

## 2023-01-06 PROCEDURE — 1159F MED LIST DOCD IN RCRD: CPT | Mod: CPTII,S$GLB,, | Performed by: INTERNAL MEDICINE

## 2023-01-06 PROCEDURE — 93010 ELECTROCARDIOGRAM REPORT: CPT | Mod: S$GLB,,, | Performed by: INTERNAL MEDICINE

## 2023-01-06 PROCEDURE — 93005 ELECTROCARDIOGRAM TRACING: CPT

## 2023-01-06 PROCEDURE — 3077F SYST BP >= 140 MM HG: CPT | Mod: CPTII,S$GLB,, | Performed by: INTERNAL MEDICINE

## 2023-01-06 PROCEDURE — 1160F RVW MEDS BY RX/DR IN RCRD: CPT | Mod: CPTII,S$GLB,, | Performed by: INTERNAL MEDICINE

## 2023-01-06 PROCEDURE — 1126F PR PAIN SEVERITY QUANTIFIED, NO PAIN PRESENT: ICD-10-PCS | Mod: CPTII,S$GLB,, | Performed by: INTERNAL MEDICINE

## 2023-01-06 PROCEDURE — 3008F BODY MASS INDEX DOCD: CPT | Mod: CPTII,S$GLB,, | Performed by: INTERNAL MEDICINE

## 2023-01-06 NOTE — PROGRESS NOTES
[facility]    Chief Complaint  Chief Complaint   Patient presents with    Atrial Fibrillation       HPI:    Reports being fatigued at times.  When she checks her blood pressure, always notes her heart rate to be low.  He it seems to be even lower when she is feeling fatigued.  No syncope or presyncope.  Atrial fibrillation remains well controlled with no palpitations.  Tolerating anticoagulation.    Medications  Current Outpatient Medications   Medication Sig Dispense Refill    apixaban (ELIQUIS) 5 mg Tab Take 1 tablet (5 mg total) by mouth 2 (two) times daily. 180 tablet 3    Ca comb no.1/vit D3/B6/FA/B12 (VITAMIN D3, CALCIUM CIT-PHOS, ORAL) Take by mouth.      flecainide (TAMBOCOR) 100 MG Tab Take 1 tablet (100 mg total) by mouth every 12 (twelve) hours. 180 tablet 3    FLUoxetine 10 MG capsule Take 10 mg by mouth once daily.      hydroCHLOROthiazide (HYDRODIURIL) 12.5 MG Tab Take 1 tablet (12.5 mg total) by mouth once daily. 90 tablet 3    losartan (COZAAR) 100 MG tablet TAKE 1 TABLET EVERY DAY 90 tablet 3    metoprolol tartrate (LOPRESSOR) 25 MG tablet Take 1 tablet (25 mg total) by mouth 2 (two) times daily as needed (elevated heart rate). 60 tablet 11    multivitamin (THERAGRAN) per tablet Take 1 tablet by mouth once daily.      pravastatin (PRAVACHOL) 40 MG tablet Take 40 mg by mouth once daily.       topiramate (TOPAMAX) 25 MG tablet Take by mouth once daily.       No current facility-administered medications for this visit.        History  Past Medical History:   Diagnosis Date    Breast cancer 01/19/2017 6/2015: Right lumpectomy. Radiation. No chemotherapy.    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    incidentally noted on CT    Supraventricular tachycardia      Past Surgical History:   Procedure Laterality Date    BREAST LUMPECTOMY Right      Social History     Socioeconomic History    Marital  status:    Tobacco Use    Smoking status: Never    Smokeless tobacco: Never   Substance and Sexual Activity    Alcohol use: Not Currently    Drug use: Never     History reviewed. No pertinent family history.     Allergies  Review of patient's allergies indicates:  No Known Allergies    Review of Systems   Review of Systems   Constitutional: Positive for malaise/fatigue. Negative for weight gain and weight loss.   Eyes:  Negative for visual disturbance.   Cardiovascular:  Negative for chest pain, claudication, cyanosis, dyspnea on exertion, irregular heartbeat, leg swelling, near-syncope, orthopnea, palpitations, paroxysmal nocturnal dyspnea and syncope.   Respiratory:  Negative for cough, hemoptysis, shortness of breath, sleep disturbances due to breathing and wheezing.    Hematologic/Lymphatic: Negative for bleeding problem. Does not bruise/bleed easily.   Skin:  Negative for poor wound healing.   Musculoskeletal:  Negative for muscle cramps and myalgias.   Gastrointestinal:  Negative for abdominal pain, anorexia, diarrhea, heartburn, hematemesis, hematochezia, melena, nausea and vomiting.   Genitourinary:  Negative for hematuria and nocturia.   Neurological:  Negative for excessive daytime sleepiness, dizziness, focal weakness, light-headedness and weakness.   Psychiatric/Behavioral:  The patient is nervous/anxious.      Physical Exam  Vitals:    01/06/23 1046   BP: (!) 150/76     Wt Readings from Last 1 Encounters:   01/06/23 51.4 kg (113 lb 6.4 oz)     Physical Exam  Constitutional:       General: She is not in acute distress.     Appearance: She is not toxic-appearing or diaphoretic.   HENT:      Head: Normocephalic and atraumatic.   Eyes:      General: No scleral icterus.     Conjunctiva/sclera: Conjunctivae normal.   Neck:      Thyroid: No thyromegaly.      Vascular: No carotid bruit, hepatojugular reflux or JVD.      Trachea: Trachea normal.   Cardiovascular:      Rate and Rhythm: Normal rate and  regular rhythm. No extrasystoles are present.     Chest Wall: PMI is not displaced.      Pulses:           Carotid pulses are 2+ on the right side and 2+ on the left side.       Radial pulses are 2+ on the right side and 2+ on the left side.        Dorsalis pedis pulses are 2+ on the right side and 2+ on the left side.        Posterior tibial pulses are 2+ on the right side and 2+ on the left side.      Heart sounds: S1 normal and S2 normal. No murmur heard.    No S3 or S4 sounds.   Pulmonary:      Effort: No accessory muscle usage or respiratory distress.      Breath sounds: No decreased breath sounds, wheezing, rhonchi or rales.   Abdominal:      General: Bowel sounds are normal. There is no abdominal bruit.      Palpations: Abdomen is soft. There is no hepatomegaly, splenomegaly or pulsatile mass.      Tenderness: There is no abdominal tenderness.   Musculoskeletal:         General: No tenderness or deformity.      Right lower leg: No edema.      Left lower leg: No edema.   Skin:     General: Skin is warm and dry.      Coloration: Skin is not cyanotic or pale.      Nails: There is no clubbing.   Neurological:      General: No focal deficit present.      Mental Status: She is alert and oriented to person, place, and time.      Motor: Tremor present.   Psychiatric:         Speech: Speech normal.         Behavior: Behavior normal. Behavior is cooperative.       Labs  Lab Visit on 11/18/2022   Component Date Value Ref Range Status    Sodium 11/18/2022 144  136 - 145 mmol/L Final    Potassium 11/18/2022 4.1  3.5 - 5.1 mmol/L Final    Chloride 11/18/2022 111 (H)  95 - 110 mmol/L Final    CO2 11/18/2022 23  23 - 29 mmol/L Final    Glucose 11/18/2022 96  70 - 110 mg/dL Final    BUN 11/18/2022 15  8 - 23 mg/dL Final    Creatinine 11/18/2022 0.8  0.5 - 1.4 mg/dL Final    Calcium 11/18/2022 9.5  8.7 - 10.5 mg/dL Final    Total Protein 11/18/2022 7.0  6.0 - 8.4 g/dL Final    Albumin 11/18/2022 3.8  3.5 - 5.2 g/dL Final     Total Bilirubin 11/18/2022 0.4  0.1 - 1.0 mg/dL Final    Comment: For infants and newborns, interpretation of results should be based  on gestational age, weight and in agreement with clinical  observations.    Premature Infant recommended reference ranges:  Up to 24 hours.............<8.0 mg/dL  Up to 48 hours............<12.0 mg/dL  3-5 days..................<15.0 mg/dL  6-29 days.................<15.0 mg/dL      Alkaline Phosphatase 11/18/2022 81  55 - 135 U/L Final    AST 11/18/2022 19  10 - 40 U/L Final    ALT 11/18/2022 11  10 - 44 U/L Final    Anion Gap 11/18/2022 10  8 - 16 mmol/L Final    eGFR 11/18/2022 >60  >60 mL/min/1.73 m^2 Final    Magnesium 11/18/2022 1.9  1.6 - 2.6 mg/dL Final    WBC 11/18/2022 6.09  3.90 - 12.70 K/uL Final    RBC 11/18/2022 3.86 (L)  4.00 - 5.40 M/uL Final    Hemoglobin 11/18/2022 13.2  12.0 - 16.0 g/dL Final    Hematocrit 11/18/2022 40.0  37.0 - 48.5 % Final    MCV 11/18/2022 104 (H)  82 - 98 fL Final    MCH 11/18/2022 34.2 (H)  27.0 - 31.0 pg Final    MCHC 11/18/2022 33.0  32.0 - 36.0 g/dL Final    RDW 11/18/2022 12.8  11.5 - 14.5 % Final    Platelets 11/18/2022 237  150 - 450 K/uL Final    MPV 11/18/2022 10.7  9.2 - 12.9 fL Final    Immature Granulocytes 11/18/2022 0.2  0.0 - 0.5 % Final    Gran # (ANC) 11/18/2022 3.5  1.8 - 7.7 K/uL Final    Immature Grans (Abs) 11/18/2022 0.01  0.00 - 0.04 K/uL Final    Comment: Mild elevation in immature granulocytes is non specific and   can be seen in a variety of conditions including stress response,   acute inflammation, trauma and pregnancy. Correlation with other   laboratory and clinical findings is essential.      Lymph # 11/18/2022 1.9  1.0 - 4.8 K/uL Final    Mono # 11/18/2022 0.6  0.3 - 1.0 K/uL Final    Eos # 11/18/2022 0.1  0.0 - 0.5 K/uL Final    Baso # 11/18/2022 0.05  0.00 - 0.20 K/uL Final    nRBC 11/18/2022 0  0 /100 WBC Final    Gran % 11/18/2022 58.0  38.0 - 73.0 % Final    Lymph % 11/18/2022 30.5  18.0 - 48.0 % Final     Mono % 11/18/2022 9.5  4.0 - 15.0 % Final    Eosinophil % 11/18/2022 1.0  0.0 - 8.0 % Final    Basophil % 11/18/2022 0.8  0.0 - 1.9 % Final    Differential Method 11/18/2022 Automated   Final   Orders Only on 09/27/2022   Component Date Value Ref Range Status    Cholesterol 09/27/2022 130  <200 mg/dL Final    HDL 09/27/2022 48 (L)  > OR = 50 mg/dL Final    Triglycerides 09/27/2022 61  <150 mg/dL Final    LDL Cholesterol 09/27/2022 68  mg/dL (calc) Final    Comment: Reference range: <100     Desirable range <100 mg/dL for primary prevention;    <70 mg/dL for patients with CHD or diabetic patients   with > or = 2 CHD risk factors.     LDL-C is now calculated using the Mateo   calculation, which is a validated novel method providing   better accuracy than the Friedewald equation in the   estimation of LDL-C.   Eren SS et al. JALEN. 2013;310(19): 8649-5850   (http://education.Canwest/faq/XBM529)      HDL/Cholesterol Ratio 09/27/2022 2.7  <5.0 (calc) Final    Non HDL Chol. (LDL+VLDL) 09/27/2022 82  <130 mg/dL (calc) Final    Comment: For patients with diabetes plus 1 major ASCVD risk   factor, treating to a non-HDL-C goal of <100 mg/dL   (LDL-C of <70 mg/dL) is considered a therapeutic   option.      Magnesium 09/27/2022 2.1  1.5 - 2.5 mg/dL Final    Glucose 09/27/2022 89  65 - 99 mg/dL Final    Comment:               Fasting reference interval         BUN 09/27/2022 21  7 - 25 mg/dL Final    Creatinine 09/27/2022 0.84  0.50 - 1.05 mg/dL Final    eGFR 09/27/2022 76  > OR = 60 mL/min/1.73m2 Final    Comment: The eGFR is based on the CKD-EPI 2021 equation. To calculate   the new eGFR from a previous Creatinine or Cystatin C  result, go to https://www.kidney.org/professionals/  kdoqi/gfr%5Fcalculator      BUN/Creatinine Ratio 09/27/2022 NOT APPLICABLE  6 - 22 (calc) Final    Sodium 09/27/2022 141  135 - 146 mmol/L Final    Potassium 09/27/2022 3.8  3.5 - 5.3 mmol/L Final    Chloride 09/27/2022 108  98  - 110 mmol/L Final    CO2 09/27/2022 26  20 - 32 mmol/L Final    Calcium 09/27/2022 8.9  8.6 - 10.4 mg/dL Final    Total Protein 09/27/2022 6.6  6.1 - 8.1 g/dL Final    Albumin 09/27/2022 3.8  3.6 - 5.1 g/dL Final    Globulin, Total 09/27/2022 2.8  1.9 - 3.7 g/dL (calc) Final    Albumin/Globulin Ratio 09/27/2022 1.4  1.0 - 2.5 (calc) Final    Total Bilirubin 09/27/2022 0.4  0.2 - 1.2 mg/dL Final    Alkaline Phosphatase 09/27/2022 81  37 - 153 U/L Final    AST 09/27/2022 18  10 - 35 U/L Final    ALT 09/27/2022 11  6 - 29 U/L Final    WBC 09/27/2022 5.5  3.8 - 10.8 Thousand/uL Final    RBC 09/27/2022 3.78 (L)  3.80 - 5.10 Million/uL Final    Hemoglobin 09/27/2022 12.9  11.7 - 15.5 g/dL Final    Hematocrit 09/27/2022 38.7  35.0 - 45.0 % Final    MCV 09/27/2022 102.4 (H)  80.0 - 100.0 fL Final    MCH 09/27/2022 34.1 (H)  27.0 - 33.0 pg Final    MCHC 09/27/2022 33.3  32.0 - 36.0 g/dL Final    RDW 09/27/2022 12.1  11.0 - 15.0 % Final    Platelets 09/27/2022 237  140 - 400 Thousand/uL Final    MPV 09/27/2022 10.9  7.5 - 12.5 fL Final    Neutrophils, Abs 09/27/2022 3,284  1,500 - 7,800 cells/uL Final    Lymph # 09/27/2022 1,612  850 - 3,900 cells/uL Final    Mono # 09/27/2022 484  200 - 950 cells/uL Final    Eos # 09/27/2022 72  15 - 500 cells/uL Final    Baso # 09/27/2022 50  0 - 200 cells/uL Final    Neutrophils Relative 09/27/2022 59.7  % Final    Lymph % 09/27/2022 29.3  % Final    Mono % 09/27/2022 8.8  % Final    Eosinophil % 09/27/2022 1.3  % Final    Basophil % 09/27/2022 0.9  % Final    TSH 09/27/2022 1.42  0.40 - 4.50 mIU/L Final       Imaging  No results found.    Assessment  1. Paroxysmal atrial fibrillation  Controlled and tolerating anticoagulation    2. Chronic diastolic heart failure  Compensated    3. Supraventricular tachycardia  Remote history.  Controlled    4. Superior mesenteric artery aneurysm  Followed at [facility]    5. Primary hypertension  Controlled      Plan and Discussion    Will  stop diltiazem and see if increasing her heart rate helps with her fatigue.  Discussed that her blood pressure may trend up.  If so, will increase hydrochlorothiazide dose.  Also discussed that if she has recurrence of atrial fibrillation, it will go faster off of diltiazem.  She will continue to use metoprolol on an as-needed basis.    The 10-year ASCVD risk score (Ange ROBLES, et al., 2019) is: 12.2%    Values used to calculate the score:      Age: 67 years      Sex: Female      Is Non- : No      Diabetic: No      Tobacco smoker: No      Systolic Blood Pressure: 150 mmHg      Is BP treated: Yes      HDL Cholesterol: 48 mg/dL      Total Cholesterol: 130 mg/dL     Follow Up  Follow up in about 3 months (around 4/6/2023).      aVrgas Norman MD

## 2023-01-10 ENCOUNTER — NURSE TRIAGE (OUTPATIENT)
Dept: ADMINISTRATIVE | Facility: CLINIC | Age: 68
End: 2023-01-10
Payer: MEDICARE

## 2023-01-10 NOTE — TELEPHONE ENCOUNTER
Pt c/o lower back pain to left side 6/10 since yesterday. Pt states that she has a hx of kidney stones and now has flank pain. Care advice to be seen in ED now per protocol. Verbalized understanding. Encounter routed to provider.       Reason for Disposition   Sudden onset of severe flank pain and age > 60 years    Additional Information   Negative: Passed out (i.e., lost consciousness, collapsed and was not responding)   Negative: Shock suspected (e.g., cold/pale/clammy skin, too weak to stand, low BP, rapid pulse)   Negative: Sounds like a life-threatening emergency to the triager   Negative: SEVERE pain (e.g., excruciating, scale 8-10) and present > 1 hour    Protocols used: Flank Pain-A-[place]

## 2023-03-31 ENCOUNTER — OFFICE VISIT (OUTPATIENT)
Dept: CARDIOLOGY | Facility: CLINIC | Age: 68
End: 2023-03-31
Payer: MEDICARE

## 2023-03-31 VITALS
DIASTOLIC BLOOD PRESSURE: 78 MMHG | SYSTOLIC BLOOD PRESSURE: 152 MMHG | HEART RATE: 52 BPM | WEIGHT: 107.06 LBS | BODY MASS INDEX: 19.58 KG/M2 | OXYGEN SATURATION: 98 %

## 2023-03-31 DIAGNOSIS — I48.0 PAROXYSMAL ATRIAL FIBRILLATION: ICD-10-CM

## 2023-03-31 DIAGNOSIS — I50.32 CHRONIC DIASTOLIC HEART FAILURE: Primary | ICD-10-CM

## 2023-03-31 DIAGNOSIS — I10 PRIMARY HYPERTENSION: ICD-10-CM

## 2023-03-31 PROCEDURE — 4010F ACE/ARB THERAPY RXD/TAKEN: CPT | Mod: CPTII,S$GLB,, | Performed by: INTERNAL MEDICINE

## 2023-03-31 PROCEDURE — 99999 PR PBB SHADOW E&M-EST. PATIENT-LVL III: ICD-10-PCS | Mod: PBBFAC,,, | Performed by: INTERNAL MEDICINE

## 2023-03-31 PROCEDURE — 99214 OFFICE O/P EST MOD 30 MIN: CPT | Mod: S$GLB,,, | Performed by: INTERNAL MEDICINE

## 2023-03-31 PROCEDURE — 3008F PR BODY MASS INDEX (BMI) DOCUMENTED: ICD-10-PCS | Mod: CPTII,S$GLB,, | Performed by: INTERNAL MEDICINE

## 2023-03-31 PROCEDURE — 3077F SYST BP >= 140 MM HG: CPT | Mod: CPTII,S$GLB,, | Performed by: INTERNAL MEDICINE

## 2023-03-31 PROCEDURE — 1126F PR PAIN SEVERITY QUANTIFIED, NO PAIN PRESENT: ICD-10-PCS | Mod: CPTII,S$GLB,, | Performed by: INTERNAL MEDICINE

## 2023-03-31 PROCEDURE — 1159F PR MEDICATION LIST DOCUMENTED IN MEDICAL RECORD: ICD-10-PCS | Mod: CPTII,S$GLB,, | Performed by: INTERNAL MEDICINE

## 2023-03-31 PROCEDURE — 3008F BODY MASS INDEX DOCD: CPT | Mod: CPTII,S$GLB,, | Performed by: INTERNAL MEDICINE

## 2023-03-31 PROCEDURE — 99214 PR OFFICE/OUTPT VISIT, EST, LEVL IV, 30-39 MIN: ICD-10-PCS | Mod: S$GLB,,, | Performed by: INTERNAL MEDICINE

## 2023-03-31 PROCEDURE — 3078F DIAST BP <80 MM HG: CPT | Mod: CPTII,S$GLB,, | Performed by: INTERNAL MEDICINE

## 2023-03-31 PROCEDURE — 99999 PR PBB SHADOW E&M-EST. PATIENT-LVL III: CPT | Mod: PBBFAC,,, | Performed by: INTERNAL MEDICINE

## 2023-03-31 PROCEDURE — 3077F PR MOST RECENT SYSTOLIC BLOOD PRESSURE >= 140 MM HG: ICD-10-PCS | Mod: CPTII,S$GLB,, | Performed by: INTERNAL MEDICINE

## 2023-03-31 PROCEDURE — 3078F PR MOST RECENT DIASTOLIC BLOOD PRESSURE < 80 MM HG: ICD-10-PCS | Mod: CPTII,S$GLB,, | Performed by: INTERNAL MEDICINE

## 2023-03-31 PROCEDURE — 1159F MED LIST DOCD IN RCRD: CPT | Mod: CPTII,S$GLB,, | Performed by: INTERNAL MEDICINE

## 2023-03-31 PROCEDURE — 1126F AMNT PAIN NOTED NONE PRSNT: CPT | Mod: CPTII,S$GLB,, | Performed by: INTERNAL MEDICINE

## 2023-03-31 PROCEDURE — 4010F PR ACE/ARB THEARPY RXD/TAKEN: ICD-10-PCS | Mod: CPTII,S$GLB,, | Performed by: INTERNAL MEDICINE

## 2023-03-31 PROCEDURE — 1160F PR REVIEW ALL MEDS BY PRESCRIBER/CLIN PHARMACIST DOCUMENTED: ICD-10-PCS | Mod: CPTII,S$GLB,, | Performed by: INTERNAL MEDICINE

## 2023-03-31 PROCEDURE — 1160F RVW MEDS BY RX/DR IN RCRD: CPT | Mod: CPTII,S$GLB,, | Performed by: INTERNAL MEDICINE

## 2023-03-31 RX ORDER — HYDROCHLOROTHIAZIDE 25 MG/1
25 TABLET ORAL DAILY
Qty: 90 TABLET | Refills: 3 | Status: SHIPPED | OUTPATIENT
Start: 2023-03-31 | End: 2024-03-30

## 2023-03-31 RX ORDER — FLUOXETINE HYDROCHLORIDE 20 MG/1
20 CAPSULE ORAL DAILY
COMMUNITY
Start: 2023-02-08

## 2023-03-31 NOTE — PROGRESS NOTES
[facility]    Chief Complaint  Chief Complaint   Patient presents with    Atrial Fibrillation       HPI:    Not much different with her fatigue off of diltiazem.  Successful kidney stone extraction.  Discussed her upcoming tooth extraction.  I told her that I have no concerns if her dentist wants to withhold Eliquis in advance of the procedure.  It is up to him.    Medications  Current Outpatient Medications   Medication Sig Dispense Refill    Ca comb no.1/vit D3/B6/FA/B12 (VITAMIN D3, CALCIUM CIT-PHOS, ORAL) Take by mouth.      ELIQUIS 5 mg Tab TAKE 1 TABLET TWICE DAILY 180 tablet 3    flecainide (TAMBOCOR) 100 MG Tab Take 1 tablet (100 mg total) by mouth every 12 (twelve) hours. 180 tablet 3    FLUoxetine 20 MG capsule Take 20 mg by mouth Daily.      hydroCHLOROthiazide (HYDRODIURIL) 25 MG tablet Take 1 tablet (25 mg total) by mouth once daily. 90 tablet 3    losartan (COZAAR) 100 MG tablet TAKE 1 TABLET EVERY DAY 90 tablet 3    metoprolol tartrate (LOPRESSOR) 25 MG tablet Take 1 tablet (25 mg total) by mouth 2 (two) times daily as needed (elevated heart rate). 60 tablet 11    multivitamin (THERAGRAN) per tablet Take 1 tablet by mouth once daily.      pravastatin (PRAVACHOL) 40 MG tablet Take 40 mg by mouth once daily.       topiramate (TOPAMAX) 25 MG tablet Take by mouth once daily.       No current facility-administered medications for this visit.        History  Past Medical History:   Diagnosis Date    Breast cancer 01/19/2017 6/2015: Right lumpectomy. Radiation. No chemotherapy.    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 No pertinent family history.     Allergies  Review of patient's allergies indicates:  No Known Allergies    Review of Systems   Review of Systems   Constitutional: Positive for malaise/fatigue. Negative for weight gain and weight loss.   HENT:  Positive for nosebleeds.    Eyes:  Negative for visual disturbance.   Cardiovascular:  Negative for chest pain, claudication, cyanosis, dyspnea on exertion, irregular heartbeat, leg swelling, near-syncope, orthopnea, palpitations, paroxysmal nocturnal dyspnea and syncope.   Respiratory:  Negative for cough, hemoptysis, shortness of breath, sleep disturbances due to breathing and wheezing.    Hematologic/Lymphatic: Negative for bleeding problem. Does not bruise/bleed easily.   Skin:  Negative for poor wound healing.   Musculoskeletal:  Negative for muscle cramps and myalgias.   Gastrointestinal:  Negative for abdominal pain, anorexia, diarrhea, heartburn, hematemesis, hematochezia, melena, nausea and vomiting.   Genitourinary:  Negative for hematuria and nocturia.   Neurological:  Negative for excessive daytime sleepiness, dizziness, focal weakness, light-headedness and weakness.   Psychiatric/Behavioral:  The patient is nervous/anxious.      Physical Exam  Vitals:    03/31/23 1151   BP: (!) 152/78   Pulse: (!) 52     Wt Readings from Last 1 Encounters:   03/31/23 48.5 kg (107 lb 0.5 oz)     Physical Exam  Constitutional:       General: She is not in acute distress.     Appearance: She is not toxic-appearing or diaphoretic.   HENT:      Head: Normocephalic and atraumatic.   Eyes:      General: No scleral icterus.     Conjunctiva/sclera: Conjunctivae normal.   Neck:      Thyroid: No thyromegaly.      Vascular: No carotid bruit, hepatojugular reflux or JVD.      Trachea: Trachea normal.   Cardiovascular:      Rate and Rhythm: Normal rate and regular rhythm. No extrasystoles are  present.     Chest Wall: PMI is not displaced.      Pulses:           Carotid pulses are 2+ on the right side and 2+ on the left side.       Radial pulses are 2+ on the right side and 2+ on the left side.        Dorsalis pedis pulses are 2+ on the right side and 2+ on the left side.        Posterior tibial pulses are 2+ on the right side and 2+ on the left side.      Heart sounds: S1 normal and S2 normal. No murmur heard.    No S3 or S4 sounds.   Pulmonary:      Effort: No accessory muscle usage or respiratory distress.      Breath sounds: No decreased breath sounds, wheezing, rhonchi or rales.   Abdominal:      General: Bowel sounds are normal. There is no abdominal bruit.      Palpations: Abdomen is soft. There is no hepatomegaly, splenomegaly or pulsatile mass.      Tenderness: There is no abdominal tenderness.   Musculoskeletal:         General: No tenderness or deformity.      Right lower leg: No edema.      Left lower leg: No edema.   Skin:     General: Skin is warm and dry.      Coloration: Skin is not cyanotic or pale.      Nails: There is no clubbing.   Neurological:      General: No focal deficit present.      Mental Status: She is alert and oriented to person, place, and time.      Motor: Tremor present.   Psychiatric:         Speech: Speech normal.         Behavior: Behavior normal. Behavior is cooperative.       Labs  Lab Visit on 11/18/2022   Component Date Value Ref Range Status    Sodium 11/18/2022 144  136 - 145 mmol/L Final    Potassium 11/18/2022 4.1  3.5 - 5.1 mmol/L Final    Chloride 11/18/2022 111 (H)  95 - 110 mmol/L Final    CO2 11/18/2022 23  23 - 29 mmol/L Final    Glucose 11/18/2022 96  70 - 110 mg/dL Final    BUN 11/18/2022 15  8 - 23 mg/dL Final    Creatinine 11/18/2022 0.8  0.5 - 1.4 mg/dL Final    Calcium 11/18/2022 9.5  8.7 - 10.5 mg/dL Final    Total Protein 11/18/2022 7.0  6.0 - 8.4 g/dL Final    Albumin 11/18/2022 3.8  3.5 - 5.2 g/dL Final    Total Bilirubin 11/18/2022 0.4  0.1 -  1.0 mg/dL Final    Comment: For infants and newborns, interpretation of results should be based  on gestational age, weight and in agreement with clinical  observations.    Premature Infant recommended reference ranges:  Up to 24 hours.............<8.0 mg/dL  Up to 48 hours............<12.0 mg/dL  3-5 days..................<15.0 mg/dL  6-29 days.................<15.0 mg/dL      Alkaline Phosphatase 11/18/2022 81  55 - 135 U/L Final    AST 11/18/2022 19  10 - 40 U/L Final    ALT 11/18/2022 11  10 - 44 U/L Final    Anion Gap 11/18/2022 10  8 - 16 mmol/L Final    eGFR 11/18/2022 >60  >60 mL/min/1.73 m^2 Final    Magnesium 11/18/2022 1.9  1.6 - 2.6 mg/dL Final    WBC 11/18/2022 6.09  3.90 - 12.70 K/uL Final    RBC 11/18/2022 3.86 (L)  4.00 - 5.40 M/uL Final    Hemoglobin 11/18/2022 13.2  12.0 - 16.0 g/dL Final    Hematocrit 11/18/2022 40.0  37.0 - 48.5 % Final    MCV 11/18/2022 104 (H)  82 - 98 fL Final    MCH 11/18/2022 34.2 (H)  27.0 - 31.0 pg Final    MCHC 11/18/2022 33.0  32.0 - 36.0 g/dL Final    RDW 11/18/2022 12.8  11.5 - 14.5 % Final    Platelets 11/18/2022 237  150 - 450 K/uL Final    MPV 11/18/2022 10.7  9.2 - 12.9 fL Final    Immature Granulocytes 11/18/2022 0.2  0.0 - 0.5 % Final    Gran # (ANC) 11/18/2022 3.5  1.8 - 7.7 K/uL Final    Immature Grans (Abs) 11/18/2022 0.01  0.00 - 0.04 K/uL Final    Comment: Mild elevation in immature granulocytes is non specific and   can be seen in a variety of conditions including stress response,   acute inflammation, trauma and pregnancy. Correlation with other   laboratory and clinical findings is essential.      Lymph # 11/18/2022 1.9  1.0 - 4.8 K/uL Final    Mono # 11/18/2022 0.6  0.3 - 1.0 K/uL Final    Eos # 11/18/2022 0.1  0.0 - 0.5 K/uL Final    Baso # 11/18/2022 0.05  0.00 - 0.20 K/uL Final    nRBC 11/18/2022 0  0 /100 WBC Final    Gran % 11/18/2022 58.0  38.0 - 73.0 % Final    Lymph % 11/18/2022 30.5  18.0 - 48.0 % Final    Mono % 11/18/2022 9.5  4.0 - 15.0 %  Final    Eosinophil % 11/18/2022 1.0  0.0 - 8.0 % Final    Basophil % 11/18/2022 0.8  0.0 - 1.9 % Final    Differential Method 11/18/2022 Automated   Final       Imaging  No results found.    Assessment  1. Chronic diastolic heart failure  Compensated  - Basic Metabolic Panel; Future  - Magnesium; Future    2. Paroxysmal atrial fibrillation  Controlled and tolerating anticoagulation    3. Primary hypertension  Blood pressure has increased off diltiazem.  Will increase hydrochlorothiazide to 25 mg daily.      Plan and Discussion    Check blood work 1-2 weeks after increasing hydrochlorothiazide.  Continue other guideline directed medical therapy.    The 10-year ASCVD risk score (Ange DK, et al., 2019) is: 12.5%    Values used to calculate the score:      Age: 67 years      Sex: Female      Is Non- : No      Diabetic: No      Tobacco smoker: No      Systolic Blood Pressure: 152 mmHg      Is BP treated: Yes      HDL Cholesterol: 48 mg/dL      Total Cholesterol: 130 mg/dL     Follow Up  Follow up in about 6 months (around 9/30/2023).      Vargas Norman MD

## 2023-08-11 RX ORDER — FLECAINIDE ACETATE 100 MG/1
100 TABLET ORAL EVERY 12 HOURS
Qty: 180 TABLET | Refills: 3 | Status: SHIPPED | OUTPATIENT
Start: 2023-08-11

## 2023-10-13 ENCOUNTER — OFFICE VISIT (OUTPATIENT)
Dept: CARDIOLOGY | Facility: CLINIC | Age: 68
End: 2023-10-13
Payer: MEDICARE

## 2023-10-13 VITALS
OXYGEN SATURATION: 98 % | BODY MASS INDEX: 20.67 KG/M2 | SYSTOLIC BLOOD PRESSURE: 140 MMHG | WEIGHT: 113 LBS | HEART RATE: 48 BPM | DIASTOLIC BLOOD PRESSURE: 72 MMHG

## 2023-10-13 DIAGNOSIS — I50.32 CHRONIC DIASTOLIC HEART FAILURE: Primary | ICD-10-CM

## 2023-10-13 DIAGNOSIS — I10 PRIMARY HYPERTENSION: ICD-10-CM

## 2023-10-13 DIAGNOSIS — I48.0 PAROXYSMAL ATRIAL FIBRILLATION: ICD-10-CM

## 2023-10-13 PROCEDURE — 3077F SYST BP >= 140 MM HG: CPT | Mod: CPTII,S$GLB,, | Performed by: INTERNAL MEDICINE

## 2023-10-13 PROCEDURE — 3078F DIAST BP <80 MM HG: CPT | Mod: CPTII,S$GLB,, | Performed by: INTERNAL MEDICINE

## 2023-10-13 PROCEDURE — 1101F PR PT FALLS ASSESS DOC 0-1 FALLS W/OUT INJ PAST YR: ICD-10-PCS | Mod: CPTII,S$GLB,, | Performed by: INTERNAL MEDICINE

## 2023-10-13 PROCEDURE — 1160F RVW MEDS BY RX/DR IN RCRD: CPT | Mod: CPTII,S$GLB,, | Performed by: INTERNAL MEDICINE

## 2023-10-13 PROCEDURE — 3008F PR BODY MASS INDEX (BMI) DOCUMENTED: ICD-10-PCS | Mod: CPTII,S$GLB,, | Performed by: INTERNAL MEDICINE

## 2023-10-13 PROCEDURE — 1160F PR REVIEW ALL MEDS BY PRESCRIBER/CLIN PHARMACIST DOCUMENTED: ICD-10-PCS | Mod: CPTII,S$GLB,, | Performed by: INTERNAL MEDICINE

## 2023-10-13 PROCEDURE — 99999 PR PBB SHADOW E&M-EST. PATIENT-LVL III: CPT | Mod: PBBFAC,,, | Performed by: INTERNAL MEDICINE

## 2023-10-13 PROCEDURE — 3008F BODY MASS INDEX DOCD: CPT | Mod: CPTII,S$GLB,, | Performed by: INTERNAL MEDICINE

## 2023-10-13 PROCEDURE — 99214 OFFICE O/P EST MOD 30 MIN: CPT | Mod: S$GLB,,, | Performed by: INTERNAL MEDICINE

## 2023-10-13 PROCEDURE — 4010F PR ACE/ARB THEARPY RXD/TAKEN: ICD-10-PCS | Mod: CPTII,S$GLB,, | Performed by: INTERNAL MEDICINE

## 2023-10-13 PROCEDURE — 3288F FALL RISK ASSESSMENT DOCD: CPT | Mod: CPTII,S$GLB,, | Performed by: INTERNAL MEDICINE

## 2023-10-13 PROCEDURE — 1126F AMNT PAIN NOTED NONE PRSNT: CPT | Mod: CPTII,S$GLB,, | Performed by: INTERNAL MEDICINE

## 2023-10-13 PROCEDURE — 3078F PR MOST RECENT DIASTOLIC BLOOD PRESSURE < 80 MM HG: ICD-10-PCS | Mod: CPTII,S$GLB,, | Performed by: INTERNAL MEDICINE

## 2023-10-13 PROCEDURE — 3077F PR MOST RECENT SYSTOLIC BLOOD PRESSURE >= 140 MM HG: ICD-10-PCS | Mod: CPTII,S$GLB,, | Performed by: INTERNAL MEDICINE

## 2023-10-13 PROCEDURE — 99999 PR PBB SHADOW E&M-EST. PATIENT-LVL III: ICD-10-PCS | Mod: PBBFAC,,, | Performed by: INTERNAL MEDICINE

## 2023-10-13 PROCEDURE — 1159F MED LIST DOCD IN RCRD: CPT | Mod: CPTII,S$GLB,, | Performed by: INTERNAL MEDICINE

## 2023-10-13 PROCEDURE — 1101F PT FALLS ASSESS-DOCD LE1/YR: CPT | Mod: CPTII,S$GLB,, | Performed by: INTERNAL MEDICINE

## 2023-10-13 PROCEDURE — 1159F PR MEDICATION LIST DOCUMENTED IN MEDICAL RECORD: ICD-10-PCS | Mod: CPTII,S$GLB,, | Performed by: INTERNAL MEDICINE

## 2023-10-13 PROCEDURE — 99214 PR OFFICE/OUTPT VISIT, EST, LEVL IV, 30-39 MIN: ICD-10-PCS | Mod: S$GLB,,, | Performed by: INTERNAL MEDICINE

## 2023-10-13 PROCEDURE — 1126F PR PAIN SEVERITY QUANTIFIED, NO PAIN PRESENT: ICD-10-PCS | Mod: CPTII,S$GLB,, | Performed by: INTERNAL MEDICINE

## 2023-10-13 PROCEDURE — 3288F PR FALLS RISK ASSESSMENT DOCUMENTED: ICD-10-PCS | Mod: CPTII,S$GLB,, | Performed by: INTERNAL MEDICINE

## 2023-10-13 PROCEDURE — 4010F ACE/ARB THERAPY RXD/TAKEN: CPT | Mod: CPTII,S$GLB,, | Performed by: INTERNAL MEDICINE

## 2023-10-13 RX ORDER — LOSARTAN POTASSIUM 100 MG/1
100 TABLET ORAL DAILY
Qty: 90 TABLET | Refills: 3 | Status: SHIPPED | OUTPATIENT
Start: 2023-10-13

## 2023-10-13 NOTE — PROGRESS NOTES
[facility]    Chief Complaint  Chief Complaint   Patient presents with    Atrial Fibrillation       HPI:    She has been doing well.  No complaints.  More active now that the summer heat has abated.  No problems with exertional dyspnea or chest discomfort.  Still has occasional palpitations at night when she lies down after a stressful day.    Medications  Current Outpatient Medications   Medication Sig Dispense Refill    Ca comb no.1/vit D3/B6/FA/B12 (VITAMIN D3, CALCIUM CIT-PHOS, ORAL) Take by mouth.      ELIQUIS 5 mg Tab TAKE 1 TABLET TWICE DAILY 180 tablet 3    flecainide (TAMBOCOR) 100 MG Tab TAKE 1 TABLET EVERY 12 HOURS 180 tablet 3    FLUoxetine 20 MG capsule Take 20 mg by mouth Daily.      hydroCHLOROthiazide (HYDRODIURIL) 25 MG tablet Take 1 tablet (25 mg total) by mouth once daily. 90 tablet 3    losartan (COZAAR) 100 MG tablet TAKE 1 TABLET EVERY DAY 90 tablet 3    metoprolol tartrate (LOPRESSOR) 25 MG tablet Take 1 tablet (25 mg total) by mouth 2 (two) times daily as needed (elevated heart rate). 60 tablet 11    multivitamin (THERAGRAN) per tablet Take 1 tablet by mouth once daily.      pravastatin (PRAVACHOL) 40 MG tablet Take 40 mg by mouth once daily.       topiramate (TOPAMAX) 25 MG tablet Take by mouth once daily.       No current facility-administered medications for this visit.        History  Past Medical History:   Diagnosis Date    Breast cancer 01/19/2017 6/2015: Right lumpectomy. Radiation. No chemotherapy.    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    incidentally noted on CT    Supraventricular tachycardia      Past Surgical History:   Procedure Laterality Date    BREAST LUMPECTOMY Right      Social History     Socioeconomic History    Marital status:    Tobacco Use    Smoking status: Never    Smokeless tobacco: Never   Substance and Sexual Activity    Alcohol use: Not Currently     Drug use: Never     History reviewed. No pertinent family history.     Allergies  Review of patient's allergies indicates:  No Known Allergies    Review of Systems   Review of Systems   Constitutional: Negative for malaise/fatigue, weight gain and weight loss.   HENT:  Negative for nosebleeds.    Eyes:  Negative for visual disturbance.   Cardiovascular:  Negative for chest pain, claudication, cyanosis, dyspnea on exertion, irregular heartbeat, leg swelling, near-syncope, orthopnea, palpitations, paroxysmal nocturnal dyspnea and syncope.   Respiratory:  Negative for cough, hemoptysis, shortness of breath, sleep disturbances due to breathing and wheezing.    Hematologic/Lymphatic: Negative for bleeding problem. Does not bruise/bleed easily.   Skin:  Negative for poor wound healing.   Musculoskeletal:  Negative for muscle cramps and myalgias.   Gastrointestinal:  Negative for abdominal pain, anorexia, diarrhea, heartburn, hematemesis, hematochezia, melena, nausea and vomiting.   Genitourinary:  Negative for hematuria and nocturia.   Neurological:  Negative for excessive daytime sleepiness, dizziness, focal weakness, light-headedness and weakness.   Psychiatric/Behavioral:  The patient is nervous/anxious.        Physical Exam  Vitals:    10/13/23 1131   BP: (!) 140/72   Pulse: (!) 48     Wt Readings from Last 1 Encounters:   10/13/23 51.3 kg (113 lb)     Physical Exam  Constitutional:       General: She is not in acute distress.     Appearance: She is not toxic-appearing or diaphoretic.   HENT:      Head: Normocephalic and atraumatic.   Eyes:      General: No scleral icterus.     Conjunctiva/sclera: Conjunctivae normal.   Neck:      Thyroid: No thyromegaly.      Vascular: No carotid bruit, hepatojugular reflux or JVD.      Trachea: Trachea normal.   Cardiovascular:      Rate and Rhythm: Normal rate and regular rhythm. No extrasystoles are present.     Chest Wall: PMI is not displaced.      Pulses:           Carotid  pulses are 2+ on the right side and 2+ on the left side.       Radial pulses are 2+ on the right side and 2+ on the left side.        Dorsalis pedis pulses are 2+ on the right side and 2+ on the left side.        Posterior tibial pulses are 2+ on the right side and 2+ on the left side.      Heart sounds: S1 normal and S2 normal. No murmur heard.     No S3 or S4 sounds.   Pulmonary:      Effort: No accessory muscle usage or respiratory distress.      Breath sounds: No decreased breath sounds, wheezing, rhonchi or rales.   Abdominal:      General: Bowel sounds are normal. There is no abdominal bruit.      Palpations: Abdomen is soft. There is no hepatomegaly, splenomegaly or pulsatile mass.      Tenderness: There is no abdominal tenderness.   Musculoskeletal:         General: No tenderness or deformity.      Right lower leg: No edema.      Left lower leg: No edema.   Skin:     General: Skin is warm and dry.      Coloration: Skin is not cyanotic or pale.      Nails: There is no clubbing.   Neurological:      General: No focal deficit present.      Mental Status: She is alert and oriented to person, place, and time.      Motor: Tremor present.   Psychiatric:         Speech: Speech normal.         Behavior: Behavior normal. Behavior is cooperative.         Labs  No visits with results within 6 Month(s) from this visit.   Latest known visit with results is:   Lab Visit on 11/18/2022   Component Date Value Ref Range Status    Sodium 11/18/2022 144  136 - 145 mmol/L Final    Potassium 11/18/2022 4.1  3.5 - 5.1 mmol/L Final    Chloride 11/18/2022 111 (H)  95 - 110 mmol/L Final    CO2 11/18/2022 23  23 - 29 mmol/L Final    Glucose 11/18/2022 96  70 - 110 mg/dL Final    BUN 11/18/2022 15  8 - 23 mg/dL Final    Creatinine 11/18/2022 0.8  0.5 - 1.4 mg/dL Final    Calcium 11/18/2022 9.5  8.7 - 10.5 mg/dL Final    Total Protein 11/18/2022 7.0  6.0 - 8.4 g/dL Final    Albumin 11/18/2022 3.8  3.5 - 5.2 g/dL Final    Total Bilirubin  11/18/2022 0.4  0.1 - 1.0 mg/dL Final    Comment: For infants and newborns, interpretation of results should be based  on gestational age, weight and in agreement with clinical  observations.    Premature Infant recommended reference ranges:  Up to 24 hours.............<8.0 mg/dL  Up to 48 hours............<12.0 mg/dL  3-5 days..................<15.0 mg/dL  6-29 days.................<15.0 mg/dL      Alkaline Phosphatase 11/18/2022 81  55 - 135 U/L Final    AST 11/18/2022 19  10 - 40 U/L Final    ALT 11/18/2022 11  10 - 44 U/L Final    Anion Gap 11/18/2022 10  8 - 16 mmol/L Final    eGFR 11/18/2022 >60  >60 mL/min/1.73 m^2 Final    Magnesium 11/18/2022 1.9  1.6 - 2.6 mg/dL Final    WBC 11/18/2022 6.09  3.90 - 12.70 K/uL Final    RBC 11/18/2022 3.86 (L)  4.00 - 5.40 M/uL Final    Hemoglobin 11/18/2022 13.2  12.0 - 16.0 g/dL Final    Hematocrit 11/18/2022 40.0  37.0 - 48.5 % Final    MCV 11/18/2022 104 (H)  82 - 98 fL Final    MCH 11/18/2022 34.2 (H)  27.0 - 31.0 pg Final    MCHC 11/18/2022 33.0  32.0 - 36.0 g/dL Final    RDW 11/18/2022 12.8  11.5 - 14.5 % Final    Platelets 11/18/2022 237  150 - 450 K/uL Final    MPV 11/18/2022 10.7  9.2 - 12.9 fL Final    Immature Granulocytes 11/18/2022 0.2  0.0 - 0.5 % Final    Gran # (ANC) 11/18/2022 3.5  1.8 - 7.7 K/uL Final    Immature Grans (Abs) 11/18/2022 0.01  0.00 - 0.04 K/uL Final    Comment: Mild elevation in immature granulocytes is non specific and   can be seen in a variety of conditions including stress response,   acute inflammation, trauma and pregnancy. Correlation with other   laboratory and clinical findings is essential.      Lymph # 11/18/2022 1.9  1.0 - 4.8 K/uL Final    Mono # 11/18/2022 0.6  0.3 - 1.0 K/uL Final    Eos # 11/18/2022 0.1  0.0 - 0.5 K/uL Final    Baso # 11/18/2022 0.05  0.00 - 0.20 K/uL Final    nRBC 11/18/2022 0  0 /100 WBC Final    Gran % 11/18/2022 58.0  38.0 - 73.0 % Final    Lymph % 11/18/2022 30.5  18.0 - 48.0 % Final    Mono % 11/18/2022  9.5  4.0 - 15.0 % Final    Eosinophil % 11/18/2022 1.0  0.0 - 8.0 % Final    Basophil % 11/18/2022 0.8  0.0 - 1.9 % Final    Differential Method 11/18/2022 Automated   Final       Imaging  No results found.    Assessment  1. Chronic diastolic heart failure  Compensated    2. Paroxysmal atrial fibrillation  Controlled and tolerating anticoagulation    3. Primary hypertension  Controlled      Plan and Discussion    No change to current guideline directed medical therapy.  She is scheduled to have blood work in the next couple of weeks with Dr. Burgos.  Will get copies.    The 10-year ASCVD risk score (Ange DK, et al., 2019) is: 12%    Values used to calculate the score:      Age: 68 years      Sex: Female      Is Non- : No      Diabetic: No      Tobacco smoker: No      Systolic Blood Pressure: 140 mmHg      Is BP treated: Yes      HDL Cholesterol: 48 mg/dL      Total Cholesterol: 130 mg/dL     Follow Up  Follow up in about 6 months (around 4/13/2024).      Vargas Norman MD

## 2024-03-17 DIAGNOSIS — I48.0 PAROXYSMAL ATRIAL FIBRILLATION: ICD-10-CM

## 2024-03-18 RX ORDER — APIXABAN 5 MG/1
TABLET, FILM COATED ORAL
Qty: 180 TABLET | Refills: 3 | Status: SHIPPED | OUTPATIENT
Start: 2024-03-18

## 2024-04-12 ENCOUNTER — OFFICE VISIT (OUTPATIENT)
Dept: CARDIOLOGY | Facility: CLINIC | Age: 69
End: 2024-04-12
Payer: MEDICARE

## 2024-04-12 VITALS
WEIGHT: 117.38 LBS | OXYGEN SATURATION: 100 % | HEIGHT: 62 IN | SYSTOLIC BLOOD PRESSURE: 149 MMHG | HEART RATE: 60 BPM | BODY MASS INDEX: 21.6 KG/M2 | DIASTOLIC BLOOD PRESSURE: 66 MMHG

## 2024-04-12 DIAGNOSIS — I72.8 SUPERIOR MESENTERIC ARTERY ANEURYSM: ICD-10-CM

## 2024-04-12 DIAGNOSIS — I48.0 PAROXYSMAL ATRIAL FIBRILLATION: ICD-10-CM

## 2024-04-12 DIAGNOSIS — I50.32 CHRONIC DIASTOLIC HEART FAILURE: ICD-10-CM

## 2024-04-12 PROCEDURE — 3078F DIAST BP <80 MM HG: CPT | Mod: CPTII,S$GLB,, | Performed by: INTERNAL MEDICINE

## 2024-04-12 PROCEDURE — 3008F BODY MASS INDEX DOCD: CPT | Mod: CPTII,S$GLB,, | Performed by: INTERNAL MEDICINE

## 2024-04-12 PROCEDURE — 99999 PR PBB SHADOW E&M-EST. PATIENT-LVL IV: CPT | Mod: PBBFAC,,, | Performed by: INTERNAL MEDICINE

## 2024-04-12 PROCEDURE — 99214 OFFICE O/P EST MOD 30 MIN: CPT | Mod: S$GLB,,, | Performed by: INTERNAL MEDICINE

## 2024-04-12 PROCEDURE — 3288F FALL RISK ASSESSMENT DOCD: CPT | Mod: CPTII,S$GLB,, | Performed by: INTERNAL MEDICINE

## 2024-04-12 PROCEDURE — 93000 ELECTROCARDIOGRAM COMPLETE: CPT | Mod: S$GLB,,, | Performed by: INTERNAL MEDICINE

## 2024-04-12 PROCEDURE — 1126F AMNT PAIN NOTED NONE PRSNT: CPT | Mod: CPTII,S$GLB,, | Performed by: INTERNAL MEDICINE

## 2024-04-12 PROCEDURE — 4010F ACE/ARB THERAPY RXD/TAKEN: CPT | Mod: CPTII,S$GLB,, | Performed by: INTERNAL MEDICINE

## 2024-04-12 PROCEDURE — 1159F MED LIST DOCD IN RCRD: CPT | Mod: CPTII,S$GLB,, | Performed by: INTERNAL MEDICINE

## 2024-04-12 PROCEDURE — 1101F PT FALLS ASSESS-DOCD LE1/YR: CPT | Mod: CPTII,S$GLB,, | Performed by: INTERNAL MEDICINE

## 2024-04-12 PROCEDURE — 3077F SYST BP >= 140 MM HG: CPT | Mod: CPTII,S$GLB,, | Performed by: INTERNAL MEDICINE

## 2024-04-12 NOTE — PROGRESS NOTES
[facility]    Chief Complaint  Chief Complaint   Patient presents with    Atrial Fibrillation       HPI:    Noting more exertional dyspnea.  No associated chest discomfort.  Resolves quickly with rest.  Occasional palpitations but nothing sustained.    Medications  Current Outpatient Medications   Medication Sig Dispense Refill    Ca comb no.1/vit D3/B6/FA/B12 (VITAMIN D3, CALCIUM CIT-PHOS, ORAL) Take by mouth.      ELIQUIS 5 mg Tab TAKE 1 TABLET TWICE DAILY 180 tablet 3    flecainide (TAMBOCOR) 100 MG Tab TAKE 1 TABLET EVERY 12 HOURS 180 tablet 3    FLUoxetine 20 MG capsule Take 20 mg by mouth Daily.      losartan (COZAAR) 100 MG tablet Take 1 tablet (100 mg total) by mouth once daily. 90 tablet 3    multivitamin (THERAGRAN) per tablet Take 1 tablet by mouth once daily.      pravastatin (PRAVACHOL) 40 MG tablet Take 40 mg by mouth once daily.       topiramate (TOPAMAX) 25 MG tablet Take by mouth once daily.      hydroCHLOROthiazide (HYDRODIURIL) 25 MG tablet Take 1 tablet (25 mg total) by mouth once daily. 90 tablet 3    metoprolol tartrate (LOPRESSOR) 25 MG tablet Take 1 tablet (25 mg total) by mouth 2 (two) times daily as needed (elevated heart rate). 60 tablet 11     No current facility-administered medications for this visit.        History  Past Medical History:   Diagnosis Date    Breast cancer 01/19/2017 6/2015: Right lumpectomy. Radiation. No chemotherapy.    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 No pertinent family  history.     Allergies  Review of patient's allergies indicates:  No Known Allergies    Review of Systems   Review of Systems   Constitutional: Negative for malaise/fatigue, weight gain and weight loss.   HENT:  Negative for nosebleeds.    Eyes:  Negative for visual disturbance.   Cardiovascular:  Positive for dyspnea on exertion. Negative for chest pain, claudication, cyanosis, irregular heartbeat, leg swelling, near-syncope, orthopnea, palpitations, paroxysmal nocturnal dyspnea and syncope.   Respiratory:  Negative for cough, hemoptysis, shortness of breath, sleep disturbances due to breathing and wheezing.    Hematologic/Lymphatic: Negative for bleeding problem. Does not bruise/bleed easily.   Skin:  Negative for poor wound healing.   Musculoskeletal:  Negative for muscle cramps and myalgias.   Gastrointestinal:  Negative for abdominal pain, anorexia, diarrhea, heartburn, hematemesis, hematochezia, melena, nausea and vomiting.   Genitourinary:  Negative for hematuria and nocturia.   Neurological:  Negative for excessive daytime sleepiness, dizziness, focal weakness, light-headedness and weakness.       Physical Exam  Vitals:    04/12/24 1101   BP: (!) 149/66   Pulse: 60     Wt Readings from Last 1 Encounters:   04/12/24 53.3 kg (117 lb 6.3 oz)     Physical Exam  Constitutional:       General: She is not in acute distress.     Appearance: She is not toxic-appearing or diaphoretic.   HENT:      Head: Normocephalic and atraumatic.   Eyes:      General: No scleral icterus.     Conjunctiva/sclera: Conjunctivae normal.   Neck:      Thyroid: No thyromegaly.      Vascular: No carotid bruit, hepatojugular reflux or JVD.      Trachea: Trachea normal.   Cardiovascular:      Rate and Rhythm: Normal rate and regular rhythm. No extrasystoles are present.     Chest Wall: PMI is not displaced.      Pulses:           Carotid pulses are 2+ on the right side and 2+ on the left side.       Radial pulses are 2+ on the right side and  2+ on the left side.        Dorsalis pedis pulses are 2+ on the right side and 2+ on the left side.        Posterior tibial pulses are 2+ on the right side and 2+ on the left side.      Heart sounds: S1 normal and S2 normal. No murmur heard.     No S3 or S4 sounds.   Pulmonary:      Effort: No accessory muscle usage or respiratory distress.      Breath sounds: No decreased breath sounds, wheezing, rhonchi or rales.   Abdominal:      General: Bowel sounds are normal. There is no abdominal bruit.      Palpations: Abdomen is soft. There is no hepatomegaly, splenomegaly or pulsatile mass.      Tenderness: There is no abdominal tenderness.   Musculoskeletal:         General: No tenderness or deformity.      Right lower leg: No edema.      Left lower leg: No edema.   Skin:     General: Skin is warm and dry.      Coloration: Skin is not cyanotic or pale.      Nails: There is no clubbing.   Neurological:      General: No focal deficit present.      Mental Status: She is alert and oriented to person, place, and time.      Motor: Tremor present.   Psychiatric:         Speech: Speech normal.         Behavior: Behavior normal. Behavior is cooperative.         Labs  No visits with results within 6 Month(s) from this visit.   Latest known visit with results is:   Lab Visit on 11/18/2022   Component Date Value Ref Range Status    Sodium 11/18/2022 144  136 - 145 mmol/L Final    Potassium 11/18/2022 4.1  3.5 - 5.1 mmol/L Final    Chloride 11/18/2022 111 (H)  95 - 110 mmol/L Final    CO2 11/18/2022 23  23 - 29 mmol/L Final    Glucose 11/18/2022 96  70 - 110 mg/dL Final    BUN 11/18/2022 15  8 - 23 mg/dL Final    Creatinine 11/18/2022 0.8  0.5 - 1.4 mg/dL Final    Calcium 11/18/2022 9.5  8.7 - 10.5 mg/dL Final    Total Protein 11/18/2022 7.0  6.0 - 8.4 g/dL Final    Albumin 11/18/2022 3.8  3.5 - 5.2 g/dL Final    Total Bilirubin 11/18/2022 0.4  0.1 - 1.0 mg/dL Final    Comment: For infants and newborns, interpretation of results  should be based  on gestational age, weight and in agreement with clinical  observations.    Premature Infant recommended reference ranges:  Up to 24 hours.............<8.0 mg/dL  Up to 48 hours............<12.0 mg/dL  3-5 days..................<15.0 mg/dL  6-29 days.................<15.0 mg/dL      Alkaline Phosphatase 11/18/2022 81  55 - 135 U/L Final    AST 11/18/2022 19  10 - 40 U/L Final    ALT 11/18/2022 11  10 - 44 U/L Final    Anion Gap 11/18/2022 10  8 - 16 mmol/L Final    eGFR 11/18/2022 >60  >60 mL/min/1.73 m^2 Final    Magnesium 11/18/2022 1.9  1.6 - 2.6 mg/dL Final    WBC 11/18/2022 6.09  3.90 - 12.70 K/uL Final    RBC 11/18/2022 3.86 (L)  4.00 - 5.40 M/uL Final    Hemoglobin 11/18/2022 13.2  12.0 - 16.0 g/dL Final    Hematocrit 11/18/2022 40.0  37.0 - 48.5 % Final    MCV 11/18/2022 104 (H)  82 - 98 fL Final    MCH 11/18/2022 34.2 (H)  27.0 - 31.0 pg Final    MCHC 11/18/2022 33.0  32.0 - 36.0 g/dL Final    RDW 11/18/2022 12.8  11.5 - 14.5 % Final    Platelets 11/18/2022 237  150 - 450 K/uL Final    MPV 11/18/2022 10.7  9.2 - 12.9 fL Final    Immature Granulocytes 11/18/2022 0.2  0.0 - 0.5 % Final    Gran # (ANC) 11/18/2022 3.5  1.8 - 7.7 K/uL Final    Immature Grans (Abs) 11/18/2022 0.01  0.00 - 0.04 K/uL Final    Comment: Mild elevation in immature granulocytes is non specific and   can be seen in a variety of conditions including stress response,   acute inflammation, trauma and pregnancy. Correlation with other   laboratory and clinical findings is essential.      Lymph # 11/18/2022 1.9  1.0 - 4.8 K/uL Final    Mono # 11/18/2022 0.6  0.3 - 1.0 K/uL Final    Eos # 11/18/2022 0.1  0.0 - 0.5 K/uL Final    Baso # 11/18/2022 0.05  0.00 - 0.20 K/uL Final    nRBC 11/18/2022 0  0 /100 WBC Final    Gran % 11/18/2022 58.0  38.0 - 73.0 % Final    Lymph % 11/18/2022 30.5  18.0 - 48.0 % Final    Mono % 11/18/2022 9.5  4.0 - 15.0 % Final    Eosinophil % 11/18/2022 1.0  0.0 - 8.0 % Final    Basophil % 11/18/2022  0.8  0.0 - 1.9 % Final    Differential Method 11/18/2022 Automated   Final       Imaging  No results found.    Assessment  1. Chronic diastolic heart failure  Compensated  - Comprehensive Metabolic Panel; Future  - CBC Auto Differential; Future  - TSH; Future  - Stress Echo Color Flow Doppler? No; Which stress agent will be used? Treadmill Exercise; Future  - Lipid Panel; Future    2. Paroxysmal atrial fibrillation  Controlled and tolerating anticoagulation  - Comprehensive Metabolic Panel; Future  - CBC Auto Differential; Future  - TSH; Future  - Stress Echo Color Flow Doppler? No; Which stress agent will be used? Treadmill Exercise; Future  - Lipid Panel; Future    3. Superior mesenteric artery aneurysm  No therapy needed      Plan and Discussion    Will plan for stress test to assess blood pressure control, heart rate response to activities, and for ischemia.  Check blood work.  Otherwise, continue current guideline directed medical therapy.    The 10-year ASCVD risk score (Ange DK, et al., 2019) is: 13.5%    Values used to calculate the score:      Age: 68 years      Sex: Female      Is Non- : No      Diabetic: No      Tobacco smoker: No      Systolic Blood Pressure: 149 mmHg      Is BP treated: Yes      HDL Cholesterol: 48 mg/dL      Total Cholesterol: 130 mg/dL     Follow Up  Follow up in about 6 months (around 10/12/2024).      Vargas Norman MD

## 2024-04-15 LAB
OHS QRS DURATION: 100 MS
OHS QTC CALCULATION: 457 MS

## 2024-07-03 ENCOUNTER — HOSPITAL ENCOUNTER (EMERGENCY)
Facility: OTHER | Age: 69
Discharge: HOME | End: 2024-07-03
Attending: EMERGENCY MEDICINE
Payer: MEDICARE

## 2024-07-03 VITALS
TEMPERATURE: 98 F | OXYGEN SATURATION: 98 % | HEART RATE: 52 BPM | DIASTOLIC BLOOD PRESSURE: 67 MMHG | WEIGHT: 120 LBS | BODY MASS INDEX: 22.08 KG/M2 | SYSTOLIC BLOOD PRESSURE: 143 MMHG | HEIGHT: 62 IN | RESPIRATION RATE: 18 BRPM

## 2024-07-03 DIAGNOSIS — S52.531A CLOSED COLLES' FRACTURE OF RIGHT RADIUS, INITIAL ENCOUNTER: Primary | ICD-10-CM

## 2024-07-03 DIAGNOSIS — S69.91XA RIGHT WRIST INJURY: ICD-10-CM
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
## 2024-07-03 DIAGNOSIS — M25.531 WRIST PAIN, ACUTE, RIGHT: ICD-10-CM

## 2024-07-03 PROCEDURE — 63600175 PHARM REV CODE 636 W HCPCS: Performed by: EMERGENCY MEDICINE

## 2024-07-03 PROCEDURE — 25605 CLTX DST RDL FX/EPHYS SEP W/: CPT | Mod: RT

## 2024-07-03 PROCEDURE — 99285 EMERGENCY DEPT VISIT HI MDM: CPT | Mod: 25

## 2024-07-03 PROCEDURE — 96372 THER/PROPH/DIAG INJ SC/IM: CPT | Performed by: EMERGENCY MEDICINE

## 2024-07-03 PROCEDURE — 25000003 PHARM REV CODE 250: Performed by: EMERGENCY MEDICINE

## 2024-07-03 RX ORDER — KETOROLAC TROMETHAMINE 30 MG/ML
10 INJECTION, SOLUTION INTRAMUSCULAR; INTRAVENOUS
Status: COMPLETED | OUTPATIENT
Start: 2024-07-03 | End: 2024-07-03

## 2024-07-03 RX ORDER — ACETAMINOPHEN 325 MG/1
650 TABLET ORAL
Status: DISCONTINUED | OUTPATIENT
Start: 2024-07-03 | End: 2024-07-03 | Stop reason: HOSPADM

## 2024-07-03 RX ORDER — LIDOCAINE HYDROCHLORIDE 10 MG/ML
1 INJECTION INFILTRATION; PERINEURAL ONCE
Status: COMPLETED | OUTPATIENT
Start: 2024-07-03 | End: 2024-07-03

## 2024-07-03 RX ORDER — HYDROCODONE BITARTRATE AND ACETAMINOPHEN 5; 325 MG/1; MG/1
1 TABLET ORAL EVERY 4 HOURS PRN
Qty: 12 TABLET | Refills: 0 | Status: SHIPPED | OUTPATIENT
Start: 2024-07-03

## 2024-07-03 RX ADMIN — KETOROLAC TROMETHAMINE 10 MG: 30 INJECTION, SOLUTION INTRAMUSCULAR; INTRAVENOUS at 06:07

## 2024-07-03 RX ADMIN — LIDOCAINE HYDROCHLORIDE 1 ML: 10 INJECTION, SOLUTION INFILTRATION; PERINEURAL at 06:07

## 2024-07-03 NOTE — ED PROVIDER NOTES
Encounter Date: 7/3/2024       History     Chief Complaint   Patient presents with    Wrist Injury     Right hand, wrist and forearm pain after falling on Monday. Sent by Dr. Claude Williams for eval. Bruising and swelling noted.      68-year-old female presents emergency department complaining of right forearm and wrist pain.  Patient reports she was in [place] on Monday and was walking down steps and grasp the hand rail which was hot which caused her to fall onto her right hand with subsequent pain and swelling of her right forearm.  Patient attempted to follow up with Orthopedics and was unable to get an appointment.  She was referred by primary care Dr. Flores.  Denies other injury, no headaches or head trauma.     The history is provided by the patient.     Review of patient's allergies indicates:  No Known Allergies  Past Medical History:   Diagnosis Date    Breast cancer 01/19/2017 6/2015: Right lumpectomy. Radiation. No chemotherapy.    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  Social History     Tobacco Use    Smoking status: Never    Smokeless tobacco: Never   Substance Use Topics    Alcohol use: Not Currently    Drug use: Never     Review of Systems   Constitutional:  Negative for fever.   HENT:  Negative for sore throat.    Respiratory:  Negative for shortness of breath.    Cardiovascular:  Negative for chest pain.   Gastrointestinal:  Negative for nausea.   Genitourinary:  Negative for dysuria.   Musculoskeletal:  Negative for back pain.        Right wrist pain   Skin:  Negative for color change, rash and wound.   Neurological:  Negative for weakness.   Hematological:  Does not bruise/bleed easily.   All other systems reviewed and are  negative.      Physical Exam     Initial Vitals [07/03/24 1444]   BP Pulse Resp Temp SpO2   (!) 143/64 (!) 57 18 98.6 °F (37 °C) 98 %      MAP       --         Physical Exam    Nursing note and vitals reviewed.  Constitutional: She appears well-developed and well-nourished. No distress.   HENT:   Head: Normocephalic and atraumatic.   Right Ear: External ear normal.   Left Ear: External ear normal.   Eyes: Conjunctivae and EOM are normal. Pupils are equal, round, and reactive to light. Right eye exhibits no discharge. Left eye exhibits no discharge. No scleral icterus.   Neck: Neck supple.   Normal range of motion.  Cardiovascular:  Normal rate, regular rhythm and normal heart sounds.     Exam reveals no gallop and no friction rub.       No murmur heard.  Pulmonary/Chest: Breath sounds normal. No stridor. No respiratory distress. She has no wheezes. She has no rhonchi. She has no rales.   Abdominal: Abdomen is soft. Bowel sounds are normal. She exhibits no distension and no mass. There is no abdominal tenderness. There is no rebound and no guarding.   Musculoskeletal:         General: No edema. Normal range of motion.      Cervical back: Normal range of motion and neck supple.     Neurological: She is alert and oriented to person, place, and time. She has normal strength. No cranial nerve deficit or sensory deficit. GCS score is 15. GCS eye subscore is 4. GCS verbal subscore is 5. GCS motor subscore is 6.   Skin: Skin is warm and dry. Capillary refill takes less than 2 seconds.   Psychiatric: She has a normal mood and affect. Her behavior is normal. Judgment and thought content normal.         ED Course   Orthopedic Injury    Date/Time: 7/12/2024 2:05 PM    Performed by: Janeen Campoverde MD  Authorized by: Janeen Campoverde MD    Location procedure was performed:  [facility], [place] EMERGENCY DEPARTMENT  Injury:     Injury location:  Wrist    Location details:  Right wrist    Injury type:  Fracture    Fracture type: distal radius,  ulnar styloid and distal radius and ulnar styloid      Fracture type: distal radius, ulnar styloid and distal radius and ulnar styloid        Pre-procedure assessment:     Neurovascular status: Neurovascularly intact      Distal perfusion: normal      Neurological function: normal      Range of motion: normal      Local anesthesia used?: Yes      Anesthesia:  Local infiltration    Local anesthetic:  Lidocaine 1% without epinephrine    Anesthetic total (ml):  5    Patient sedated?: No        Selections made in this section will also lock the Injury type section above.:     Manipulation performed?: Yes      Skeletal traction used?: Yes      Reduction successful?: Yes      Confirmation: Reduction confirmed by x-ray      Splint type:  Sugar tong    Supplies used:  Ortho-Glass    Complications: No      Specimens: No      Implants: No    Post-procedure assessment:     Neurovascular status: Neurovascularly intact      Distal perfusion: normal      Neurological function: normal      Range of motion: normal      Patient tolerance:  Patient tolerated the procedure well with no immediate complications    Labs Reviewed - No data to display       Imaging Results              X-Ray Forearm Right (Final result)  Result time 07/03/24 19:48:12      Final result by Carmelita Limon MD (07/03/24 19:48:12)                   Impression:      As above described.      Electronically signed by: Carmelita Limon  Date:    07/03/2024  Time:    19:48               Narrative:    EXAMINATION:  TWO VIEWS OF THE RIGHT FOREARM    CLINICAL HISTORY:  Pain in right wrist    TECHNIQUE:  AP and lateral view of the right forearm    COMPARISON:  Left wrist 07/03/2024    FINDINGS:  There is been interval placement of cast like material, which obscures bony detail.  Two views of the right forearm demonstrate distal radial and ulnar styloid fractures, which are not significantly changed in positioning or alignment.                                        X-Ray Wrist Complete Right (Final result)  Result time 07/03/24 15:56:37      Final result by Jose Alberto Zaman MD (07/03/24 15:56:37)                   Impression:      Acute displaced intra-articular fracture of the right distal radius.    Acute displaced fracture of the ulna styloid process.    Marked soft tissue swelling of the right wrist.      Electronically signed by: Jose Alberto Zaman MD  Date:    07/03/2024  Time:    15:56               Narrative:    EXAMINATION:  XR FOREARM RIGHT; XR WRIST COMPLETE 3 VIEWS RIGHT; XR HAND COMPLETE 3 VIEW RIGHT    CLINICAL HISTORY:  Fall;Unspecified fall, initial encounter; Unspecified injury of right wrist, hand and finger(s), initial encounter    TECHNIQUE:  AP and lateral views of the right forearm were performed.    Three x-ray views of the right hand and the right wrist.    COMPARISON:  None    FINDINGS:  The bone mineralization is within normal limits.  There is an acute displaced intra-articular fracture of the right distal radius.  There is dorsal angulation of the distal fracture fragment.  There is also acute displaced fracture of the ulna styloid process.    The joint spaces are maintained.  There is diffuse soft tissue swelling at the level of the wrist.  No radiopaque foreign body is identified.                                       X-Ray Hand 3 view Right (Final result)  Result time 07/03/24 15:56:37      Final result by Jose Alberto Zaman MD (07/03/24 15:56:37)                   Impression:      Acute displaced intra-articular fracture of the right distal radius.    Acute displaced fracture of the ulna styloid process.    Marked soft tissue swelling of the right wrist.      Electronically signed by: Jose Alberto Zaman MD  Date:    07/03/2024  Time:    15:56               Narrative:    EXAMINATION:  XR FOREARM RIGHT; XR WRIST COMPLETE 3 VIEWS RIGHT; XR HAND COMPLETE 3 VIEW RIGHT    CLINICAL HISTORY:  Fall;Unspecified fall, initial encounter; Unspecified injury of right wrist, hand  and finger(s), initial encounter    TECHNIQUE:  AP and lateral views of the right forearm were performed.    Three x-ray views of the right hand and the right wrist.    COMPARISON:  None    FINDINGS:  The bone mineralization is within normal limits.  There is an acute displaced intra-articular fracture of the right distal radius.  There is dorsal angulation of the distal fracture fragment.  There is also acute displaced fracture of the ulna styloid process.    The joint spaces are maintained.  There is diffuse soft tissue swelling at the level of the wrist.  No radiopaque foreign body is identified.                                       X-Ray Forearm Right (Final result)  Result time 07/03/24 15:56:37      Final result by Jose Alberto Zaman MD (07/03/24 15:56:37)                   Impression:      Acute displaced intra-articular fracture of the right distal radius.    Acute displaced fracture of the ulna styloid process.    Marked soft tissue swelling of the right wrist.      Electronically signed by: Jose Alberto Zaman MD  Date:    07/03/2024  Time:    15:56               Narrative:    EXAMINATION:  XR FOREARM RIGHT; XR WRIST COMPLETE 3 VIEWS RIGHT; XR HAND COMPLETE 3 VIEW RIGHT    CLINICAL HISTORY:  Fall;Unspecified fall, initial encounter; Unspecified injury of right wrist, hand and finger(s), initial encounter    TECHNIQUE:  AP and lateral views of the right forearm were performed.    Three x-ray views of the right hand and the right wrist.    COMPARISON:  None    FINDINGS:  The bone mineralization is within normal limits.  There is an acute displaced intra-articular fracture of the right distal radius.  There is dorsal angulation of the distal fracture fragment.  There is also acute displaced fracture of the ulna styloid process.    The joint spaces are maintained.  There is diffuse soft tissue swelling at the level of the wrist.  No radiopaque foreign body is identified.                                    X-Rays:    Independently Interpreted Readings:   Other Readings:  Distal radius fracture with impaction and intra-articular extension.  Ulnar styloid fracture.  Soft tissue swelling.     Medications   LIDOcaine HCL 10 mg/ml (1%) injection 1 mL (1 mL Intradermal Given by Other 7/3/24 1804)   ketorolac injection 9.999 mg (9.999 mg Intramuscular Given 7/3/24 1802)     Medical Decision Making  68-year-old female with right wrist injury after mechanical fall on Monday.  X-ray reveals distal radius fracture and ulnar styloid fracture.  Plan hematoma block, closed reduction and splint.     Amount and/or Complexity of Data Reviewed  Radiology: ordered.    Risk  Prescription drug management.                                      Clinical Impression:  Final diagnoses:  [S69.91XA] Right wrist injury  [W19.XXXA] Fall  [M25.531] Wrist pain, acute, right  [S52.531A] Closed Colles' fracture of right radius, initial encounter (Primary)          ED Disposition Condition    Discharge Stable          ED Prescriptions       Medication Sig Dispense Start Date End Date Auth. Provider    HYDROcodone-acetaminophen (NORCO) 5-325 mg per tablet Take 1 tablet by mouth every 4 (four) hours as needed for Pain. 12 tablet 7/3/2024 -- Janeen Campoverde MD          Follow-up Information       Follow up With Specialties Details Why Contact Info    [facility], [facility]   [address]  [facility] [address]  [phone]               Janeen Campoverde MD  07/12/24 3052

## 2024-07-03 NOTE — FIRST PROVIDER EVALUATION
Emergency Department TeleTriage Encounter Note      CHIEF COMPLAINT    Chief Complaint   Patient presents with    Wrist Injury     Right hand, wrist and forearm pain after falling on Monday. Sent by Dr. Claude Williams for eval. Bruising and swelling noted.        VITAL SIGNS   Initial Vitals [07/03/24 1444]   BP Pulse Resp Temp SpO2   (!) 143/64 (!) 57 18 98.6 °F (37 °C) 98 %      MAP       --            ALLERGIES    Review of patient's allergies indicates:  No Known Allergies    PROVIDER TRIAGE NOTE  This is a teletriage evaluation of a 68 y.o. female presenting to the ED with c/o Right hand/wrist/forearm pain 2/2 fall. Limited physical exam via telehealth: The patient is awake, alert, answering questions appropriately and is not in respiratory distress. Initial orders will be placed and care will be transferred to an alternate provider when patient is roomed for a full evaluation. Any additional orders and the final disposition will be determined by that provider.         ORDERS  Labs Reviewed - No data to display    ED Orders (720h ago, onward)      Start Ordered     Status Ordering Provider    07/03/24 1500 07/03/24 1447  acetaminophen tablet 650 mg  ED 1 Time         Ordered KAYLA PEREZ    07/03/24 1448 07/03/24 1447  X-Ray Wrist Complete Right  1 time imaging         Ordered KAYLA PEREZ    07/03/24 1448 07/03/24 1447  X-Ray Forearm Right  1 time imaging         Ordered KAYLA PEREZ    07/03/24 1447 07/03/24 1447  X-Ray Hand 3 view Right  1 time imaging         Ordered KAYLA PEREZ              Virtual Visit Note: The provider triage portion of this emergency department evaluation and documentation was performed via [employer], a HIPAA-compliant telemedicine application, in concert with a tele-presenter in the room. A face to face patient evaluation with one of my colleagues will occur once the patient is placed in an emergency department room.      DISCLAIMER: This note was prepared with  M*Modal voice recognition transcription software. Garbled syntax, mangled pronouns, and other bizarre constructions may be attributed to that software system.

## 2024-07-05 ENCOUNTER — PATIENT MESSAGE (OUTPATIENT)
Dept: ORTHOPEDICS | Facility: CLINIC | Age: 69
End: 2024-07-05
Payer: MEDICARE

## 2024-07-05 ENCOUNTER — TELEPHONE (OUTPATIENT)
Dept: ORTHOPEDICS | Facility: CLINIC | Age: 69
End: 2024-07-05
Payer: MEDICARE

## 2024-07-05 NOTE — TELEPHONE ENCOUNTER
LVANAI c pt. Attempting to offer appt location & time page Beth 07/08/24  with XR of right wrist. Requested a call back to the [facility] at [phone] with any questions, concerns or need for a different appointment time.

## 2024-08-19 RX ORDER — FLECAINIDE ACETATE 100 MG/1
100 TABLET ORAL EVERY 12 HOURS
Qty: 180 TABLET | Refills: 3 | Status: SHIPPED | OUTPATIENT
Start: 2024-08-19

## 2024-10-18 ENCOUNTER — OFFICE VISIT (OUTPATIENT)
Dept: CARDIOLOGY | Facility: CLINIC | Age: 69
End: 2024-10-18
Payer: MEDICARE

## 2024-10-18 VITALS
WEIGHT: 120.13 LBS | SYSTOLIC BLOOD PRESSURE: 142 MMHG | DIASTOLIC BLOOD PRESSURE: 80 MMHG | HEART RATE: 64 BPM | OXYGEN SATURATION: 98 % | BODY MASS INDEX: 21.98 KG/M2

## 2024-10-18 DIAGNOSIS — I50.32 CHRONIC DIASTOLIC HEART FAILURE: ICD-10-CM

## 2024-10-18 DIAGNOSIS — I48.0 PAROXYSMAL ATRIAL FIBRILLATION: Primary | ICD-10-CM

## 2024-10-18 PROCEDURE — 99999 PR PBB SHADOW E&M-EST. PATIENT-LVL III: CPT | Mod: PBBFAC,,, | Performed by: INTERNAL MEDICINE

## 2024-10-18 NOTE — PROGRESS NOTES
[facility]    Chief Complaint  Chief Complaint   Patient presents with    Follow-up       HPI:    For at least the past, she feels that atrial fibrillation is becoming more problematic.  May be occurring twice per week.  Lasting hours at a time.  Other than feeling poorly with atrial fibrillation, no other associated symptoms.    Medications  Current Outpatient Medications   Medication Sig Dispense Refill    Ca comb no.1/vit D3/B6/FA/B12 (VITAMIN D3, CALCIUM CIT-PHOS, ORAL) Take by mouth.      ELIQUIS 5 mg Tab TAKE 1 TABLET TWICE DAILY 180 tablet 3    flecainide (TAMBOCOR) 100 MG Tab TAKE 1 TABLET EVERY 12 HOURS 180 tablet 3    FLUoxetine 20 MG capsule Take 20 mg by mouth Daily.      hydroCHLOROthiazide (HYDRODIURIL) 25 MG tablet Take 1 tablet (25 mg total) by mouth once daily. 90 tablet 3    losartan (COZAAR) 100 MG tablet Take 1 tablet (100 mg total) by mouth once daily. 90 tablet 3    metoprolol tartrate (LOPRESSOR) 25 MG tablet Take 1 tablet (25 mg total) by mouth 2 (two) times daily as needed (elevated heart rate). 60 tablet 11    multivitamin (THERAGRAN) per tablet Take 1 tablet by mouth once daily.      pravastatin (PRAVACHOL) 40 MG tablet Take 40 mg by mouth once daily.       topiramate (TOPAMAX) 25 MG tablet Take by mouth once daily.       No current facility-administered medications for this visit.        History  Past Medical History:   Diagnosis Date    Breast cancer 01/19/2017 6/2015: Right lumpectomy. Radiation. No chemotherapy.    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    incidentally noted on CT    Supraventricular tachycardia      Past Surgical History:   Procedure Laterality Date    BREAST LUMPECTOMY Right      Social History     Socioeconomic History    Marital status:    Tobacco Use    Smoking status: Never    Smokeless tobacco: Never   Substance and Sexual Activity    Alcohol use:  Not Currently    Drug use: Never     Social Drivers of Health     Financial Resource Strain: Low Risk  (10/15/2024)    Overall Financial Resource Strain (CARDIA)     Difficulty of Paying Living Expenses: Not hard at all   Food Insecurity: No Food Insecurity (10/15/2024)    Hunger Vital Sign     Worried About Running Out of Food in the Last Year: Never true     Ran Out of Food in the Last Year: Never true   Transportation Needs: No Transportation Needs (4/14/2024)    PRAPARE - Transportation     Lack of Transportation (Medical): No     Lack of Transportation (Non-Medical): No   Physical Activity: Insufficiently Active (10/15/2024)    Exercise Vital Sign     Days of Exercise per Week: 1 day     Minutes of Exercise per Session: 20 min   Stress: Stress Concern Present (10/15/2024)    [employer] [place] [employer] - Occupational Stress Questionnaire     Feeling of Stress : To some extent   Housing Stability: Unknown (10/15/2024)    Housing Stability Vital Sign     Unable to Pay for Housing in the Last Year: No     No family history on file.     Allergies  Review of patient's allergies indicates:  No Known Allergies    Review of Systems   Review of Systems   Constitutional: Negative for malaise/fatigue, weight gain and weight loss.   HENT:  Negative for nosebleeds.    Eyes:  Negative for visual disturbance.   Cardiovascular:  Positive for palpitations. Negative for chest pain, claudication, cyanosis, dyspnea on exertion, irregular heartbeat, leg swelling, near-syncope, orthopnea, paroxysmal nocturnal dyspnea and syncope.   Respiratory:  Negative for cough, hemoptysis, shortness of breath, sleep disturbances due to breathing and wheezing.    Hematologic/Lymphatic: Negative for bleeding problem. Does not bruise/bleed easily.   Skin:  Negative for poor wound healing.   Musculoskeletal:  Negative for muscle cramps and myalgias.   Gastrointestinal:  Negative for abdominal pain, anorexia, diarrhea, heartburn,  hematemesis, hematochezia, melena, nausea and vomiting.   Genitourinary:  Negative for hematuria and nocturia.   Neurological:  Negative for excessive daytime sleepiness, dizziness, focal weakness, light-headedness and weakness.       Physical Exam  Vitals:    10/18/24 1256   BP: (!) 142/80   Pulse: 64     Wt Readings from Last 1 Encounters:   10/18/24 54.5 kg (120 lb 2.4 oz)     Physical Exam  Constitutional:       General: She is not in acute distress.     Appearance: She is not toxic-appearing or diaphoretic.   HENT:      Head: Normocephalic and atraumatic.   Eyes:      General: No scleral icterus.     Conjunctiva/sclera: Conjunctivae normal.   Neck:      Thyroid: No thyromegaly.      Vascular: No carotid bruit, hepatojugular reflux or JVD.      Trachea: Trachea normal.   Cardiovascular:      Rate and Rhythm: Normal rate and regular rhythm. No extrasystoles are present.     Chest Wall: PMI is not displaced.      Pulses:           Carotid pulses are 2+ on the right side and 2+ on the left side.       Radial pulses are 2+ on the right side and 2+ on the left side.        Dorsalis pedis pulses are 2+ on the right side and 2+ on the left side.        Posterior tibial pulses are 2+ on the right side and 2+ on the left side.      Heart sounds: S1 normal and S2 normal. No murmur heard.     No S3 or S4 sounds.   Pulmonary:      Effort: No accessory muscle usage or respiratory distress.      Breath sounds: No decreased breath sounds, wheezing, rhonchi or rales.   Abdominal:      General: Bowel sounds are normal. There is no abdominal bruit.      Palpations: Abdomen is soft. There is no hepatomegaly, splenomegaly or pulsatile mass.      Tenderness: There is no abdominal tenderness.   Musculoskeletal:         General: No tenderness or deformity.      Right lower leg: No edema.      Left lower leg: No edema.   Skin:     General: Skin is warm and dry.      Coloration: Skin is not cyanotic or pale.      Nails: There is no  clubbing.   Neurological:      General: No focal deficit present.      Mental Status: She is alert and oriented to person, place, and time.      Motor: Tremor present.   Psychiatric:         Speech: Speech normal.         Behavior: Behavior normal. Behavior is cooperative.         Labs  No visits with results within 6 Month(s) from this visit.   Latest known visit with results is:   Hospital Outpatient Visit on 04/15/2024   Component Date Value Ref Range Status    BSA 04/15/2024 1.52  m2 Final    85% Max Predicted HR 04/15/2024 128   Final    Max Predicted HR 04/15/2024 151   Final    [facility] CV CPX PATIENT IS MALE 04/15/2024 0.0   Final    [facility] CV CPX PATIENT IS FEMALE 04/15/2024 1.0   Final    HR at rest 04/15/2024 62  bpm Final    Systolic blood pressure 04/15/2024 175  mmHg Final    Diastolic blood pressure 04/15/2024 84  mmHg Final    RPP 04/15/2024 10,850   Final    Exercise duration (min) 04/15/2024 5  minutes Final    Exercise duration (sec) 04/15/2024 1  seconds Final    Peak HR 04/15/2024 117  bpm Final    Peak Systolic BP 04/15/2024 174  mmHg Final    Peak Diatolic BP 04/15/2024 82  mmHg Final    Peak RPP 04/15/2024 20,358   Final    Estimated METs 04/15/2024 7   Final    % Max HR Achieved 04/15/2024 81   Final    1 Minute Recovery HR 04/15/2024 100  bpm Final       Imaging  No results found.    Assessment  1. Paroxysmal atrial fibrillation   Seems to be more bothersome and not as well controlled  - Cardiac event monitor; Future    2. Chronic diastolic heart failure  Compensated      Plan and Discussion    Event monitor to determine if her symptoms are truly atrial fibrillation.  If so, may need to alter her antiarrhythmic therapy or consider ablation.    The 10-year ASCVD risk score (Ange ROBLES, et al., 2019) is: 13.9%    Values used to calculate the score:      Age: 69 years      Sex: Female      Is Non- : No      Diabetic: No      Tobacco smoker: No      Systolic Blood Pressure: 142  mmHg      Is BP treated: Yes      HDL Cholesterol: 48 mg/dL      Total Cholesterol: 130 mg/dL     Follow Up  Follow up in about 3 months (around 1/18/2025).      Vargas Norman MD

## 2024-10-22 ENCOUNTER — CLINICAL SUPPORT (OUTPATIENT)
Dept: CARDIOLOGY | Facility: HOSPITAL | Age: 69
End: 2024-10-22
Attending: INTERNAL MEDICINE
Payer: MEDICARE

## 2024-10-22 DIAGNOSIS — I48.0 PAROXYSMAL ATRIAL FIBRILLATION: ICD-10-CM

## 2024-10-30 ENCOUNTER — PATIENT MESSAGE (OUTPATIENT)
Dept: CARDIOLOGY | Facility: CLINIC | Age: 69
End: 2024-10-30
Payer: MEDICARE

## 2024-11-10 DIAGNOSIS — I10 PRIMARY HYPERTENSION: ICD-10-CM

## 2024-11-11 RX ORDER — LOSARTAN POTASSIUM 100 MG/1
100 TABLET ORAL
Qty: 90 TABLET | Refills: 3 | Status: SHIPPED | OUTPATIENT
Start: 2024-11-11

## 2024-12-09 ENCOUNTER — TELEPHONE (OUTPATIENT)
Dept: CARDIOLOGY | Facility: HOSPITAL | Age: 69
End: 2024-12-09
Payer: MEDICARE

## 2024-12-09 NOTE — TELEPHONE ENCOUNTER
Patient wearing 30 day event monitor for diagnosis atrial fibrillation     Received auto-triggered alert notification for  Intermittent AF & pauses up to 3.7 secs  on December 8, 2024 at 3: 16 AM     Nocturnal event, non-actionable.    Will continue to monitor until 1/1/25

## 2024-12-18 ENCOUNTER — TELEPHONE (OUTPATIENT)
Dept: CARDIOLOGY | Facility: HOSPITAL | Age: 69
End: 2024-12-18
Payer: MEDICARE

## 2024-12-18 DIAGNOSIS — I48.0 PAROXYSMAL ATRIAL FIBRILLATION: Primary | ICD-10-CM

## 2024-12-18 NOTE — TELEPHONE ENCOUNTER
Cardiac event monitor report received demonstrating a patient activated event + auto triggered event for AF w/ conversion pauses  Pt with hx of AF, currently on OAC and flecainide; diurnal pause and c/w conversion pause  Max pause duration is 4.3 secs    Attempted to reach patient by phone, LVM  Spoke to her  who is not currently with her, he will ask her to contact the clinic when he returns in 1.5-2 hrs    Will forward information to ordering provider

## 2024-12-19 ENCOUNTER — TELEPHONE (OUTPATIENT)
Dept: ADMINISTRATIVE | Facility: OTHER | Age: 69
End: 2024-12-19
Payer: MEDICARE

## 2024-12-19 ENCOUNTER — TELEPHONE (OUTPATIENT)
Dept: ELECTROPHYSIOLOGY | Facility: CLINIC | Age: 69
End: 2024-12-19
Payer: MEDICARE

## 2024-12-19 DIAGNOSIS — I47.10 SUPRAVENTRICULAR TACHYCARDIA: Primary | ICD-10-CM

## 2024-12-19 NOTE — TELEPHONE ENCOUNTER
REGIS with scheduled Cardiology appointment of 1-6-2025, and contact number provided for any questions. My Chart message to be sent.

## 2024-12-26 ENCOUNTER — TELEPHONE (OUTPATIENT)
Dept: ELECTROPHYSIOLOGY | Facility: CLINIC | Age: 69
End: 2024-12-26
Payer: MEDICARE

## 2024-12-26 PROBLEM — N20.0 NEPHROLITHIASIS: Status: ACTIVE | Noted: 2024-12-26

## 2024-12-26 NOTE — PROGRESS NOTES
Subjective   Patient ID:  Stefany Dobbins is a 69 y.o. female who presents for evaluation of Atrial Fibrillation      HPI 70 yo female with atrial fibrillation, Htn, HFpEF, Nephrolithiasis, Breast cancer (rt-sided lumpectomy + XRT), essential tremors  Primary cardiologist is Dr. Norman.  She is dear friends with Rachel Norman.  Has had paroxysmal symptomatic atrial fibrillation, for roughly 5 years, on Flecainide for roughly 3 years.  Having breakthrough on Flecainide 100 mg BID.    Symptoms are palpitations and feeling poorly. Denies chest pain, shortness of breath.  Notes occasional fatigue, denies syncope or dizziness.    Event monitor recently obtained. Reveals paroxysmal atrial fibrillation with post conversion pauses of up to 3.4 seconds.    Exercise echo 4/15/24    Left Ventricle: There is normal systolic function.    Stress Protocol: The patient exercised for 5 minutes 1 seconds on a Don protocol, corresponding to a functional capacity of 7METS, achieving a peak heart rate of 117 bpm, which is 81% of the age predicted maximum heart rate. Their exercise capacity was average. The patient reported shortness of breath during the stress test. The test was stopped because the patient requested it and they experienced shortness of breath. Patient having a hard time keeping up with treadmill.    Baseline ECG: The Baseline ECG reveals sinus rhythm. The axis is normal. The ST segments are normal.    Stress ECG: There are no ST segment deviation identified during the protocol. There are no arrhythmias during stress. There is normal blood pressure response with stress.    ECG Conclusion: The ECG portion of the study is negative for ischemia.    Post-stress Echo: The left ventricle systolic function is hyperdynamic.    Post-stress Impression: The study is normal and negative with no echocardiographic evidence of stress induced ischemia.    I have independently review today's ECG and it revealed nsr with normal  baseline intervals.        Review of Systems   Constitutional: Negative. Negative for fever and malaise/fatigue.   HENT:  Negative for congestion and sore throat.    Cardiovascular:  Positive for palpitations. Negative for chest pain, dyspnea on exertion, irregular heartbeat, leg swelling, near-syncope, orthopnea, paroxysmal nocturnal dyspnea and syncope.   Respiratory:  Negative for cough and shortness of breath.    Gastrointestinal:  Negative for abdominal pain, constipation and diarrhea.   Neurological:  Negative for dizziness, light-headedness and weakness.   Psychiatric/Behavioral:  Negative for depression. The patient is not nervous/anxious.    All other systems reviewed and are negative.         Objective     Physical Exam  Constitutional:       Appearance: She is well-developed.   Eyes:      General: No scleral icterus.     Conjunctiva/sclera: Conjunctivae normal.   Neck:      Vascular: No JVD.      Trachea: No tracheal deviation.   Cardiovascular:      Rate and Rhythm: Normal rate and regular rhythm.      Chest Wall: PMI is not displaced.   Pulmonary:      Effort: Pulmonary effort is normal. No respiratory distress.      Breath sounds: Normal breath sounds.   Abdominal:      Palpations: Abdomen is soft.      Tenderness: There is no abdominal tenderness.   Musculoskeletal:         General: No tenderness.   Skin:     General: Skin is warm and dry.      Findings: No rash.   Neurological:      Mental Status: She is alert and oriented to person, place, and time.   Psychiatric:         Behavior: Behavior normal.            Assessment and Plan     1. Paroxysmal atrial fibrillation    2. Essential hypertension    3. Chronic diastolic heart failure    4. History of breast cancer    5. Nephrolithiasis    6. Tachy-char syndrome        Plan:     Paroxysmal atrial fibrillation, with breakthrough on Flecainide. Also having post conversion pauses of up to 3.4 seconds.  Recommend PVI.  Risks and benefits of PVI discussed,  she would like to proceed.  PFA.  KRISHNA day of procedure, cancel if in nsr.  Hold Eliquis morning of procedures.    Given the pauses, in interim, switch from Flecainide to Multaq. Hold Multaq 3 days prior.

## 2024-12-27 ENCOUNTER — HOSPITAL ENCOUNTER (OUTPATIENT)
Dept: CARDIOLOGY | Facility: CLINIC | Age: 69
Discharge: HOME | End: 2024-12-27
Payer: MEDICARE

## 2024-12-27 ENCOUNTER — OFFICE VISIT (OUTPATIENT)
Dept: ELECTROPHYSIOLOGY | Facility: CLINIC | Age: 69
End: 2024-12-27
Payer: MEDICARE

## 2024-12-27 VITALS
BODY MASS INDEX: 23 KG/M2 | HEIGHT: 62 IN | HEART RATE: 59 BPM | WEIGHT: 125 LBS | SYSTOLIC BLOOD PRESSURE: 136 MMHG | DIASTOLIC BLOOD PRESSURE: 72 MMHG

## 2024-12-27 DIAGNOSIS — Z85.3 HISTORY OF BREAST CANCER: ICD-10-CM

## 2024-12-27 DIAGNOSIS — I48.0 PAROXYSMAL ATRIAL FIBRILLATION: Primary | ICD-10-CM

## 2024-12-27 DIAGNOSIS — I47.10 SUPRAVENTRICULAR TACHYCARDIA: ICD-10-CM

## 2024-12-27 DIAGNOSIS — N20.0 NEPHROLITHIASIS: ICD-10-CM

## 2024-12-27 DIAGNOSIS — I49.5 TACHY-BRADY SYNDROME: ICD-10-CM

## 2024-12-27 DIAGNOSIS — I50.32 CHRONIC DIASTOLIC HEART FAILURE: ICD-10-CM

## 2024-12-27 DIAGNOSIS — I10 ESSENTIAL HYPERTENSION: ICD-10-CM

## 2024-12-27 LAB
OHS QRS DURATION: 94 MS
OHS QTC CALCULATION: 461 MS

## 2024-12-27 PROCEDURE — 93010 ELECTROCARDIOGRAM REPORT: CPT | Mod: S$GLB,,, | Performed by: INTERNAL MEDICINE

## 2024-12-27 PROCEDURE — 99999 PR PBB SHADOW E&M-EST. PATIENT-LVL III: CPT | Mod: PBBFAC,,, | Performed by: INTERNAL MEDICINE

## 2024-12-27 PROCEDURE — 93005 ELECTROCARDIOGRAM TRACING: CPT | Mod: S$GLB,,, | Performed by: INTERNAL MEDICINE

## 2024-12-30 ENCOUNTER — TELEPHONE (OUTPATIENT)
Dept: CARDIOLOGY | Facility: HOSPITAL | Age: 69
End: 2024-12-30
Payer: MEDICARE

## 2024-12-30 NOTE — TELEPHONE ENCOUNTER
"-- Message is from the [facility]--    Reason for Call: Patient is calling for Dr to send order to [address] [place] [address] for a ultrasound or breast biopsy for right breast, Please call patient once order is placed        Alternative phone number: [phone]    Turnaround time given to caller: ""This message will be sent to [facility] Provider's name]. The clinical team will fulfill your request as soon as they review your message. \""    " "Patient wearing 30 day event monitor for diagnosis atrial fibrillation     Received auto-triggered alert notification for Atrial Fibrillation/Flutter offset into Sinus Bradycardia with 4.2 second pause  on December 27, 2024 at 6: 05 PM     Per Dr. Antonio's previous note on 12/27/24, "Event monitor recently obtained. Reveals paroxysmal atrial fibrillation with post conversion pauses of up to 3.4 seconds."  Plan is for PVI and pt wishes to proceed.     Reviewed most recent conversion pause up to 4.2 secs with Dr. Guillen (consults). Hold the course and current plan of treatment. Pending PVI.    Strips placed under this encounter for review. Will continue to monitor until 1/1/25.      " LAKIA w/ Dr. Hui Petite suggestions and asked the pt to call back if she could to speak more in depth on the matter. Task done. Yes

## 2025-01-05 DIAGNOSIS — I48.0 PAROXYSMAL ATRIAL FIBRILLATION: ICD-10-CM

## 2025-01-06 RX ORDER — APIXABAN 5 MG/1
TABLET, FILM COATED ORAL
Qty: 180 TABLET | Refills: 3 | Status: SHIPPED | OUTPATIENT
Start: 2025-01-06

## 2025-01-07 ENCOUNTER — TELEPHONE (OUTPATIENT)
Dept: ELECTROPHYSIOLOGY | Facility: CLINIC | Age: 70
End: 2025-01-07
Payer: MEDICARE

## 2025-01-07 NOTE — TELEPHONE ENCOUNTER
Spoke with [facility] Pharmacist and advised that the patient is NOT on flecainide and Multaq. She is only on Multaq.

## 2025-01-07 NOTE — TELEPHONE ENCOUNTER
----- Message from Med Assistant Debbie sent at 1/7/2025 11:47 AM CST -----    ----- Message -----  From: Mare Osborn  Sent: 1/7/2025  11:37 AM CST  To: [facility] Staff    Amarilis with [facility] is calling regarding prescription dronedarone (MULTAQ) 400 mg Tab that received they need verification, stated that in interacts with another medication. She can be reached at [phone].    Thank you

## 2025-01-10 ENCOUNTER — TELEPHONE (OUTPATIENT)
Dept: ELECTROPHYSIOLOGY | Facility: CLINIC | Age: 70
End: 2025-01-10
Payer: MEDICARE

## 2025-01-10 NOTE — TELEPHONE ENCOUNTER
----- Message from Med Assistant Debbie sent at 1/10/2025 10:20 AM CST -----  Regarding: FW: Meds    ----- Message -----  From: Twila Nelson  Sent: 1/10/2025  10:17 AM CST  To: [facility] Staff  Subject: Meds                                             Ada calling to speak with staff regarding patient dronedarone (MULTAQ) 400 mg because she have severe heart failure. Please call back @ [phone]/ fax [phone]. Thank you Twila

## 2025-01-10 NOTE — TELEPHONE ENCOUNTER
Spoke with [facility] pharmacist and advised that patient does not have severe heart failure:  Echocardiography Findings    Left Ventricle The left ventricle is normal in size. Normal wall motion. There is normal systolic function.

## 2025-01-14 DIAGNOSIS — I49.5 TACHY-BRADY SYNDROME: ICD-10-CM

## 2025-01-14 DIAGNOSIS — I48.0 PAROXYSMAL ATRIAL FIBRILLATION: Primary | ICD-10-CM

## 2025-02-06 ENCOUNTER — PATIENT MESSAGE (OUTPATIENT)
Dept: ELECTROPHYSIOLOGY | Facility: CLINIC | Age: 70
End: 2025-02-06
Payer: MEDICARE

## 2025-02-14 ENCOUNTER — OFFICE VISIT (OUTPATIENT)
Dept: CARDIOLOGY | Facility: CLINIC | Age: 70
End: 2025-02-14
Payer: MEDICARE

## 2025-02-14 VITALS
OXYGEN SATURATION: 99 % | WEIGHT: 119.5 LBS | HEART RATE: 71 BPM | SYSTOLIC BLOOD PRESSURE: 114 MMHG | BODY MASS INDEX: 21.85 KG/M2 | DIASTOLIC BLOOD PRESSURE: 72 MMHG

## 2025-02-14 DIAGNOSIS — I48.0 PAROXYSMAL ATRIAL FIBRILLATION: Primary | ICD-10-CM

## 2025-02-14 DIAGNOSIS — I50.32 CHRONIC DIASTOLIC HEART FAILURE: ICD-10-CM

## 2025-02-14 PROCEDURE — 99999 PR PBB SHADOW E&M-EST. PATIENT-LVL III: CPT | Mod: PBBFAC,,, | Performed by: INTERNAL MEDICINE

## 2025-02-14 NOTE — PROGRESS NOTES
[facility]    Chief Complaint  Chief Complaint   Patient presents with    Atrial Fibrillation       HPI:    Saw Dr. Antonio.  Decided to pursue PVI.  Switch to Multaq in the interim.  Still symptomatic.  Also some GI upset.  No syncope.  Tolerating anticoagulation.    Medications  Current Outpatient Medications   Medication Sig Dispense Refill    Ca comb no.1/vit D3/B6/FA/B12 (VITAMIN D3, CALCIUM CIT-PHOS, ORAL) Take by mouth.      dronedarone (MULTAQ) 400 mg Tab Take 1 tablet (400 mg total) by mouth 2 (two) times daily with meals. 60 tablet 11    ELIQUIS 5 mg Tab TAKE 1 TABLET TWICE DAILY 180 tablet 3    FLUoxetine 20 MG capsule Take 20 mg by mouth Daily.      hydroCHLOROthiazide (HYDRODIURIL) 25 MG tablet Take 1 tablet (25 mg total) by mouth once daily. 90 tablet 3    losartan (COZAAR) 100 MG tablet TAKE 1 TABLET EVERY DAY 90 tablet 3    multivitamin (THERAGRAN) per tablet Take 1 tablet by mouth once daily.      pravastatin (PRAVACHOL) 40 MG tablet Take 40 mg by mouth once daily.        No current facility-administered medications for this visit.        History  Past Medical History:   Diagnosis Date    Breast cancer 01/19/2017 6/2015: Right lumpectomy. Radiation. No chemotherapy.    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    incidentally noted on CT    Supraventricular tachycardia      Past Surgical History:   Procedure Laterality Date    BREAST LUMPECTOMY Right      Social History     Socioeconomic History    Marital status:    Tobacco Use    Smoking status: Never    Smokeless tobacco: Never   Substance and Sexual Activity    Alcohol use: Not Currently    Drug use: Never     Social Drivers of Health     Financial Resource Strain: Low Risk  (10/15/2024)    Overall Financial Resource Strain (CARDIA)     Difficulty of Paying Living Expenses: Not hard at all   Food Insecurity: No Food Insecurity  (10/15/2024)    Hunger Vital Sign     Worried About Running Out of Food in the Last Year: Never true     Ran Out of Food in the Last Year: Never true   Transportation Needs: No Transportation Needs (4/14/2024)    PRAPARE - Transportation     Lack of Transportation (Medical): No     Lack of Transportation (Non-Medical): No   Physical Activity: Insufficiently Active (10/15/2024)    Exercise Vital Sign     Days of Exercise per Week: 1 day     Minutes of Exercise per Session: 20 min   Stress: Stress Concern Present (10/15/2024)    [employer] [place] [employer] - Occupational Stress Questionnaire     Feeling of Stress : To some extent   Housing Stability: Unknown (10/15/2024)    Housing Stability Vital Sign     Unable to Pay for Housing in the Last Year: No     No family history on file.     Allergies  Review of patient's allergies indicates:   Allergen Reactions    Aspirin Other (See Comments)     Heart races    Caffeine Other (See Comments)     Heart races       Review of Systems   Review of Systems   Constitutional: Negative for malaise/fatigue, weight gain and weight loss.   HENT:  Negative for nosebleeds.    Eyes:  Negative for visual disturbance.   Cardiovascular:  Positive for palpitations. Negative for chest pain, claudication, cyanosis, dyspnea on exertion, irregular heartbeat, leg swelling, near-syncope, orthopnea, paroxysmal nocturnal dyspnea and syncope.   Respiratory:  Negative for cough, hemoptysis, shortness of breath, sleep disturbances due to breathing and wheezing.    Hematologic/Lymphatic: Negative for bleeding problem. Does not bruise/bleed easily.   Skin:  Negative for poor wound healing.   Musculoskeletal:  Negative for muscle cramps and myalgias.   Gastrointestinal:  Negative for abdominal pain, anorexia, diarrhea, heartburn, hematemesis, hematochezia, melena, nausea and vomiting.   Genitourinary:  Negative for hematuria and nocturia.   Neurological:  Negative for excessive daytime  sleepiness, dizziness, focal weakness, light-headedness and weakness.       Physical Exam  Vitals:    02/14/25 1249   BP: 114/72   Pulse: 71     Wt Readings from Last 1 Encounters:   02/14/25 54.2 kg (119 lb 7.8 oz)     Physical Exam  Constitutional:       General: She is not in acute distress.     Appearance: She is not toxic-appearing or diaphoretic.   HENT:      Head: Normocephalic and atraumatic.   Eyes:      General: No scleral icterus.     Conjunctiva/sclera: Conjunctivae normal.   Neck:      Thyroid: No thyromegaly.      Vascular: No carotid bruit, hepatojugular reflux or JVD.      Trachea: Trachea normal.   Cardiovascular:      Rate and Rhythm: Normal rate and regular rhythm. No extrasystoles are present.     Chest Wall: PMI is not displaced.      Pulses:           Carotid pulses are 2+ on the right side and 2+ on the left side.       Radial pulses are 2+ on the right side and 2+ on the left side.        Dorsalis pedis pulses are 2+ on the right side and 2+ on the left side.        Posterior tibial pulses are 2+ on the right side and 2+ on the left side.      Heart sounds: S1 normal and S2 normal. No murmur heard.     No S3 or S4 sounds.   Pulmonary:      Effort: No accessory muscle usage or respiratory distress.      Breath sounds: No decreased breath sounds, wheezing, rhonchi or rales.   Abdominal:      General: Bowel sounds are normal. There is no abdominal bruit.      Palpations: Abdomen is soft. There is no hepatomegaly, splenomegaly or pulsatile mass.      Tenderness: There is no abdominal tenderness.   Musculoskeletal:         General: No tenderness or deformity.      Right lower leg: No edema.      Left lower leg: No edema.   Skin:     General: Skin is warm and dry.      Coloration: Skin is not cyanotic or pale.      Nails: There is no clubbing.   Neurological:      General: No focal deficit present.      Mental Status: She is alert and oriented to person, place, and time.      Motor: Tremor present.    Psychiatric:         Speech: Speech normal.         Behavior: Behavior normal. Behavior is cooperative.         Labs  Hospital Outpatient Visit on 12/27/2024   Component Date Value Ref Range Status    QRS Duration 12/27/2024 94  ms Final    [facility] QTC Calculation 12/27/2024 461  ms Final       Imaging  No results found.    Assessment  1. Paroxysmal atrial fibrillation  Scheduled for PVI next month    2. Chronic diastolic heart failure  Compensated      Plan and Discussion    No change to present management.    The 10-year ASCVD risk score (Ange DK, et al., 2019) is: 9.1%    Values used to calculate the score:      Age: 69 years      Sex: Female      Is Non- : No      Diabetic: No      Tobacco smoker: No      Systolic Blood Pressure: 114 mmHg      Is BP treated: Yes      HDL Cholesterol: 48 mg/dL      Total Cholesterol: 130 mg/dL     Follow Up  Follow up in about 4 months (around 6/14/2025).      Vargas Norman MD

## 2025-03-03 ENCOUNTER — TELEPHONE (OUTPATIENT)
Dept: ELECTROPHYSIOLOGY | Facility: CLINIC | Age: 70
End: 2025-03-03
Payer: MEDICARE

## 2025-03-03 NOTE — TELEPHONE ENCOUNTER
Gurpreet Antonio MD [place], [place]  yes          Previous Messages       ----- Message -----  From: Vita Boone  Sent: 3/3/2025   1:44 PM CST  To: Gurpreet Antonio MD    Pt scheduled for PVI PFA on 3/6. Multaq was supposed to be held 3 days prior to procedure (last dose on 3/3/25 in AM; advised pt not to take anymore doses). Ok to proceed?   LR

## 2025-03-03 NOTE — TELEPHONE ENCOUNTER
Spoke to patient    CONFIRMED procedure arrival time of 11 am    Reiterated instructions including:    -Directions to check in desk    -NPO after midnight night prior to procedure. Fasting upon arrival to the hospital the day of the procedure. Patient allowed to drink water up until 2 hours prior to arrival due to IV fluid shortage.     -High importance of HOLDING Multaq 3 days prior to procedure (last dose on 3/3/25 in AM; advised pt not to take anymore doses); holding Eliquis on day of procedure (last dose on 3/5/25)    - Confirms no new meds prescribed or med changes since scheduling -     -Pre-procedure LABS Reviewed, no alerts noted    -Confirmed absence or presence of implanted device/stimulator n/a    -Confirmed no recent or current fever, cough, or shortness of breath .    -Confirmed no redness, rash, irritation, or yeast infection to skin/ chest / groin area.     Patient verbalized understanding of above, denies any further questions and appreciated the call.

## 2025-03-05 NOTE — HPI
Stefany Dobbins is a 69 y.o. female with atrial fibrillation here for PVI with PFA.     History of atrial fibrillation, Htn, HFpEF, Nephrolithiasis, Breast cancer (rt-sided lumpectomy + XRT), essential tremors  Has had paroxysmal symptomatic atrial fibrillation, for roughly 5 years, on Flecainide for roughly 3 years. Now having breakthrough on Flecainide 100 mg BID.     Event monitor recently obtained. Reveals paroxysmal atrial fibrillation with post conversion pauses of up to 3.4 seconds.     Exercise echo 4/15/24: functional capacity of 7METS. Study is negative for ischemia. EF normal    On Multaq and Eliquis    ECG: AF, HR 120s

## 2025-03-06 ENCOUNTER — ANESTHESIA EVENT (OUTPATIENT)
Dept: MEDSURG UNIT | Facility: HOSPITAL | Age: 70
End: 2025-03-06
Payer: MEDICARE

## 2025-03-06 ENCOUNTER — ANESTHESIA (OUTPATIENT)
Dept: MEDSURG UNIT | Facility: HOSPITAL | Age: 70
End: 2025-03-06
Payer: MEDICARE

## 2025-03-06 ENCOUNTER — HOSPITAL ENCOUNTER (OUTPATIENT)
Dept: CARDIOLOGY | Facility: HOSPITAL | Age: 70
Discharge: HOME | End: 2025-03-06
Attending: INTERNAL MEDICINE | Admitting: INTERNAL MEDICINE
Payer: MEDICARE

## 2025-03-06 ENCOUNTER — HOSPITAL ENCOUNTER (OUTPATIENT)
Facility: HOSPITAL | Age: 70
Discharge: HOME | End: 2025-03-06
Attending: INTERNAL MEDICINE | Admitting: INTERNAL MEDICINE
Payer: MEDICARE

## 2025-03-06 VITALS
TEMPERATURE: 98 F | RESPIRATION RATE: 20 BRPM | WEIGHT: 120 LBS | HEIGHT: 60 IN | DIASTOLIC BLOOD PRESSURE: 84 MMHG | BODY MASS INDEX: 23.56 KG/M2 | HEART RATE: 65 BPM | OXYGEN SATURATION: 97 % | SYSTOLIC BLOOD PRESSURE: 148 MMHG

## 2025-03-06 VITALS
WEIGHT: 120 LBS | HEIGHT: 60 IN | SYSTOLIC BLOOD PRESSURE: 159 MMHG | DIASTOLIC BLOOD PRESSURE: 86 MMHG | BODY MASS INDEX: 23.56 KG/M2 | HEART RATE: 73 BPM

## 2025-03-06 DIAGNOSIS — I49.9 ARRHYTHMIA: ICD-10-CM

## 2025-03-06 DIAGNOSIS — I48.0 PAROXYSMAL ATRIAL FIBRILLATION: Primary | ICD-10-CM

## 2025-03-06 DIAGNOSIS — I49.5 TACHY-BRADY SYNDROME: ICD-10-CM

## 2025-03-06 DIAGNOSIS — I48.91 ATRIAL FIBRILLATION: ICD-10-CM

## 2025-03-06 DIAGNOSIS — I48.0 PAROXYSMAL ATRIAL FIBRILLATION: ICD-10-CM

## 2025-03-06 DIAGNOSIS — I48.91 A-FIB: ICD-10-CM

## 2025-03-06 LAB
ASCENDING AORTA: 3.4 CM
BSA FOR ECHO PROCEDURE: 1.52 M2
LAA PV: 40 CM/S
OHS QRS DURATION: 80 MS
OHS QRS DURATION: 82 MS
OHS QTC CALCULATION: 463 MS
OHS QTC CALCULATION: 497 MS
SINUS: 3.4 CM
STJ: 2.8 CM

## 2025-03-06 PROCEDURE — 93005 ELECTROCARDIOGRAM TRACING: CPT

## 2025-03-06 PROCEDURE — 93657 TX L/R ATRIAL FIB ADDL: CPT | Mod: ,,, | Performed by: INTERNAL MEDICINE

## 2025-03-06 PROCEDURE — 93656 COMPRE EP EVAL ABLTJ ATR FIB: CPT | Mod: ,,, | Performed by: INTERNAL MEDICINE

## 2025-03-06 PROCEDURE — C1733 CATH, EP, OTHR THAN COOL-TIP: HCPCS | Performed by: INTERNAL MEDICINE

## 2025-03-06 PROCEDURE — C1730 CATH, EP, 19 OR FEW ELECT: HCPCS | Performed by: INTERNAL MEDICINE

## 2025-03-06 PROCEDURE — 93312 ECHO TRANSESOPHAGEAL: CPT | Mod: 26,,, | Performed by: INTERNAL MEDICINE

## 2025-03-06 PROCEDURE — C1753 CATH, INTRAVAS ULTRASOUND: HCPCS | Performed by: INTERNAL MEDICINE

## 2025-03-06 PROCEDURE — 37000009 HC ANESTHESIA EA ADD 15 MINS: Performed by: INTERNAL MEDICINE

## 2025-03-06 PROCEDURE — 93656 COMPRE EP EVAL ABLTJ ATR FIB: CPT | Performed by: INTERNAL MEDICINE

## 2025-03-06 PROCEDURE — 93657 TX L/R ATRIAL FIB ADDL: CPT | Performed by: INTERNAL MEDICINE

## 2025-03-06 PROCEDURE — 25000003 PHARM REV CODE 250: Performed by: NURSE ANESTHETIST, CERTIFIED REGISTERED

## 2025-03-06 PROCEDURE — 93325 DOPPLER ECHO COLOR FLOW MAPG: CPT | Mod: 26,,, | Performed by: INTERNAL MEDICINE

## 2025-03-06 PROCEDURE — 63600175 PHARM REV CODE 636 W HCPCS: Performed by: NURSE ANESTHETIST, CERTIFIED REGISTERED

## 2025-03-06 PROCEDURE — C1766 INTRO/SHEATH,STRBLE,NON-PEEL: HCPCS | Performed by: INTERNAL MEDICINE

## 2025-03-06 PROCEDURE — 93320 DOPPLER ECHO COMPLETE: CPT | Mod: 26,,, | Performed by: INTERNAL MEDICINE

## 2025-03-06 PROCEDURE — 37000008 HC ANESTHESIA 1ST 15 MINUTES: Performed by: INTERNAL MEDICINE

## 2025-03-06 PROCEDURE — C1894 INTRO/SHEATH, NON-LASER: HCPCS | Performed by: INTERNAL MEDICINE

## 2025-03-06 PROCEDURE — 93312 ECHO TRANSESOPHAGEAL: CPT

## 2025-03-06 PROCEDURE — 63600175 PHARM REV CODE 636 W HCPCS: Performed by: INTERNAL MEDICINE

## 2025-03-06 PROCEDURE — C1769 GUIDE WIRE: HCPCS | Performed by: INTERNAL MEDICINE

## 2025-03-06 PROCEDURE — 27201423 OPTIME MED/SURG SUP & DEVICES STERILE SUPPLY: Performed by: INTERNAL MEDICINE

## 2025-03-06 PROCEDURE — 93010 ELECTROCARDIOGRAM REPORT: CPT | Mod: ,,, | Performed by: INTERNAL MEDICINE

## 2025-03-06 RX ORDER — PROCHLORPERAZINE EDISYLATE 5 MG/ML
5 INJECTION INTRAMUSCULAR; INTRAVENOUS EVERY 30 MIN PRN
Status: DISCONTINUED | OUTPATIENT
Start: 2025-03-06 | End: 2025-03-06 | Stop reason: HOSPADM

## 2025-03-06 RX ORDER — SODIUM CHLORIDE 0.9 % (FLUSH) 0.9 %
3 SYRINGE (ML) INJECTION
Status: DISCONTINUED | OUTPATIENT
Start: 2025-03-06 | End: 2025-03-06 | Stop reason: HOSPADM

## 2025-03-06 RX ORDER — FENTANYL CITRATE 50 UG/ML
INJECTION, SOLUTION INTRAMUSCULAR; INTRAVENOUS
Status: DISCONTINUED | OUTPATIENT
Start: 2025-03-06 | End: 2025-03-06

## 2025-03-06 RX ORDER — HALOPERIDOL 5 MG/ML
0.5 INJECTION INTRAMUSCULAR EVERY 10 MIN PRN
Status: DISCONTINUED | OUTPATIENT
Start: 2025-03-06 | End: 2025-03-06 | Stop reason: HOSPADM

## 2025-03-06 RX ORDER — ACETAMINOPHEN 325 MG/1
650 TABLET ORAL EVERY 4 HOURS PRN
Status: DISCONTINUED | OUTPATIENT
Start: 2025-03-06 | End: 2025-03-06 | Stop reason: HOSPADM

## 2025-03-06 RX ORDER — ONDANSETRON HYDROCHLORIDE 2 MG/ML
INJECTION, SOLUTION INTRAVENOUS
Status: DISCONTINUED | OUTPATIENT
Start: 2025-03-06 | End: 2025-03-06

## 2025-03-06 RX ORDER — PHENYLEPHRINE HYDROCHLORIDE 10 MG/ML
INJECTION INTRAVENOUS
Status: DISCONTINUED | OUTPATIENT
Start: 2025-03-06 | End: 2025-03-06

## 2025-03-06 RX ORDER — HEPARIN SOD,PORCINE/0.9 % NACL 1000/500ML
INTRAVENOUS SOLUTION INTRAVENOUS
Status: DISCONTINUED | OUTPATIENT
Start: 2025-03-06 | End: 2025-03-06 | Stop reason: HOSPADM

## 2025-03-06 RX ORDER — LIDOCAINE HYDROCHLORIDE 20 MG/ML
INJECTION, SOLUTION EPIDURAL; INFILTRATION; INTRACAUDAL; PERINEURAL
Status: DISCONTINUED | OUTPATIENT
Start: 2025-03-06 | End: 2025-03-06

## 2025-03-06 RX ORDER — PROTAMINE SULFATE 10 MG/ML
INJECTION, SOLUTION INTRAVENOUS
Status: DISCONTINUED | OUTPATIENT
Start: 2025-03-06 | End: 2025-03-06

## 2025-03-06 RX ORDER — ATROPINE SULFATE 0.4 MG/ML
INJECTION, SOLUTION ENDOTRACHEAL; INTRAMEDULLARY; INTRAMUSCULAR; INTRAVENOUS; SUBCUTANEOUS
Status: DISCONTINUED | OUTPATIENT
Start: 2025-03-06 | End: 2025-03-06

## 2025-03-06 RX ORDER — EPHEDRINE SULFATE 50 MG/ML
INJECTION, SOLUTION INTRAVENOUS
Status: DISCONTINUED | OUTPATIENT
Start: 2025-03-06 | End: 2025-03-06

## 2025-03-06 RX ORDER — LIDOCAINE HYDROCHLORIDE 20 MG/ML
INJECTION, SOLUTION INFILTRATION; PERINEURAL
Status: DISCONTINUED | OUTPATIENT
Start: 2025-03-06 | End: 2025-03-06 | Stop reason: HOSPADM

## 2025-03-06 RX ORDER — GLUCAGON 1 MG
1 KIT INJECTION
Status: DISCONTINUED | OUTPATIENT
Start: 2025-03-06 | End: 2025-03-06 | Stop reason: HOSPADM

## 2025-03-06 RX ORDER — HEPARIN SODIUM 1000 [USP'U]/ML
INJECTION, SOLUTION INTRAVENOUS; SUBCUTANEOUS
Status: DISCONTINUED | OUTPATIENT
Start: 2025-03-06 | End: 2025-03-06

## 2025-03-06 RX ORDER — ROCURONIUM BROMIDE 10 MG/ML
INJECTION, SOLUTION INTRAVENOUS
Status: DISCONTINUED | OUTPATIENT
Start: 2025-03-06 | End: 2025-03-06

## 2025-03-06 RX ORDER — HYDROMORPHONE HYDROCHLORIDE 1 MG/ML
0.2 INJECTION, SOLUTION INTRAMUSCULAR; INTRAVENOUS; SUBCUTANEOUS EVERY 5 MIN PRN
Status: DISCONTINUED | OUTPATIENT
Start: 2025-03-06 | End: 2025-03-06 | Stop reason: HOSPADM

## 2025-03-06 RX ORDER — PROPOFOL 10 MG/ML
VIAL (ML) INTRAVENOUS
Status: DISCONTINUED | OUTPATIENT
Start: 2025-03-06 | End: 2025-03-06

## 2025-03-06 RX ADMIN — PROTAMINE SULFATE 10 MG: 10 INJECTION, SOLUTION INTRAVENOUS at 03:03

## 2025-03-06 RX ADMIN — SODIUM CHLORIDE: 0.9 INJECTION, SOLUTION INTRAVENOUS at 01:03

## 2025-03-06 RX ADMIN — ROCURONIUM BROMIDE 20 MG: 10 INJECTION INTRAVENOUS at 02:03

## 2025-03-06 RX ADMIN — ATROPINE SULFATE 0.5 MG: 0.4 INJECTION, SOLUTION INTRAVENOUS at 02:03

## 2025-03-06 RX ADMIN — ROCURONIUM BROMIDE 50 MG: 10 INJECTION INTRAVENOUS at 01:03

## 2025-03-06 RX ADMIN — LIDOCAINE HYDROCHLORIDE 50 MG: 20 INJECTION, SOLUTION EPIDURAL; INFILTRATION; INTRACAUDAL; PERINEURAL at 01:03

## 2025-03-06 RX ADMIN — HEPARIN SODIUM 1500 UNITS: 1000 INJECTION, SOLUTION INTRAVENOUS; SUBCUTANEOUS at 02:03

## 2025-03-06 RX ADMIN — SUGAMMADEX 200 MG: 100 INJECTION, SOLUTION INTRAVENOUS at 03:03

## 2025-03-06 RX ADMIN — HEPARIN SODIUM 12 UNITS/KG/HR: 1000 INJECTION, SOLUTION INTRAVENOUS; SUBCUTANEOUS at 01:03

## 2025-03-06 RX ADMIN — EPHEDRINE SULFATE 10 MG: 50 INJECTION INTRAVENOUS at 01:03

## 2025-03-06 RX ADMIN — FENTANYL CITRATE 50 MCG: 50 INJECTION, SOLUTION INTRAMUSCULAR; INTRAVENOUS at 01:03

## 2025-03-06 RX ADMIN — FENTANYL CITRATE 50 MCG: 50 INJECTION, SOLUTION INTRAMUSCULAR; INTRAVENOUS at 02:03

## 2025-03-06 RX ADMIN — ONDANSETRON 4 MG: 2 INJECTION INTRAMUSCULAR; INTRAVENOUS at 02:03

## 2025-03-06 RX ADMIN — HEPARIN SODIUM 11000 UNITS: 1000 INJECTION, SOLUTION INTRAVENOUS; SUBCUTANEOUS at 01:03

## 2025-03-06 RX ADMIN — PROPOFOL 150 MG: 10 INJECTION, EMULSION INTRAVENOUS at 01:03

## 2025-03-06 RX ADMIN — PHENYLEPHRINE HYDROCHLORIDE 200 MCG: 10 INJECTION INTRAVENOUS at 02:03

## 2025-03-06 RX ADMIN — PROTAMINE SULFATE 15 MG: 10 INJECTION, SOLUTION INTRAVENOUS at 03:03

## 2025-03-06 NOTE — DISCHARGE SUMMARY
[facility]  Cardiac Electrophysiology  Discharge Summary      Patient Name: Stefany Dobbins  MRN: [id number]  Admission Date: 3/6/2025  Hospital Length of Stay: 0 days  Discharge Date and Time:  03/06/2025 4:11 PM  Attending Physician: Gurpreet Antonio MD    Discharging Provider: Wilda Duncan MD  Primary Care Physician: Kasey Burgos MD    HPI:   Stefany Dobbins is a 69 y.o. female with atrial fibrillation here for PVI with PFA.     History of atrial fibrillation, Htn, HFpEF, Nephrolithiasis, Breast cancer (rt-sided lumpectomy + XRT), essential tremors  Has had paroxysmal symptomatic atrial fibrillation, for roughly 5 years, on Flecainide for roughly 3 years. Now having breakthrough on Flecainide 100 mg BID.     Event monitor recently obtained. Reveals paroxysmal atrial fibrillation with post conversion pauses of up to 3.4 seconds.     Exercise echo 4/15/24: functional capacity of 7METS. Study is negative for ischemia. EF normal    On Multaq and Eliquis    ECG: AF, HR 120s       Procedure(s) (LRB):  Ablation atrial fibrillation (N/A)  Transesophageal echo (KRISHNA) intra-procedure log documentation (N/A)     Indwelling Lines/Drains at time of discharge:  Lines/Drains/Airways       None                   Hospital Course:  Patient underwent successful PFA-PVI with posterior wall isolation for treatment of atrial fibrillation. No evidence of intra- or post-procedure complications. Post-ablation ECG shows NSR, and no acute abnormalities.      EP medications at discharge:   Antiarrhythmics and/or AVN agents: unchanged.    Patient was instructed to continue their oral anticoagulation as previously prescribed   Patient was instructed to take ibuprofen 800 mg TID x 3 days for pericarditis.      Groin access sites without hematoma or bleeding. Activity restrictions given to patient. Instructed to seek medical attention for shortness of breath, chest discomfort not alleviated with NSAIDs,  bleeding/hematoma formation at the access sites, acute onset of neurologic symptoms, N/V, or hematemesis. At discharge the patient denied CP, SOB, access site bleeding/hematoma, or any other complaints or evidence of complications.         Goals of Care Treatment Preferences:         Consults:     Significant Diagnostic Studies: N/A    Pending Diagnostic Studies:       None            Final Active Diagnoses:    Diagnosis Date Noted POA    PRINCIPAL PROBLEM:  Paroxysmal atrial fibrillation [I48.0] 09/25/2020 Yes      Problems Resolved During this Admission:     No new Assessment & Plan notes have been filed under this hospital service since the last note was generated.  Service: Arrhythmia      Discharged Condition: stable    Disposition:     Follow Up:   Follow-up Information       Gurpreet Antonio MD Follow up in 3 month(s).    Specialties: Electrophysiology, Cardiology  Contact information:  [address] [place] [id number] [phone]                           Patient Instructions:   No discharge procedures on file.  Medications:  Reconciled Home Medications:      Medication List        CONTINUE taking these medications      dronedarone 400 mg Tab  Commonly known as: MULTAQ  Take 1 tablet (400 mg total) by mouth 2 (two) times daily with meals.     ELIQUIS 5 mg Tab  Generic drug: apixaban  TAKE 1 TABLET TWICE DAILY     FLUoxetine 20 MG capsule  Take 20 mg by mouth Daily.     hydroCHLOROthiazide 25 MG tablet  Commonly known as: HYDRODIURIL  Take 1 tablet (25 mg total) by mouth once daily.     losartan 100 MG tablet  Commonly known as: COZAAR  TAKE 1 TABLET EVERY DAY     multivitamin per tablet  Commonly known as: THERAGRAN  Take 1 tablet by mouth once daily.     pravastatin 40 MG tablet  Commonly known as: PRAVACHOL  Take 40 mg by mouth once daily.     VITAMIN D3 (CALCIUM CIT-PHOS) ORAL  Take by mouth.              Time spent on the discharge of patient: 45 minutes    Wilda Duncan MD  Cardiac  Electrophysiology  [facility]

## 2025-03-06 NOTE — SUBJECTIVE & OBJECTIVE
Past Medical History:   Diagnosis Date    Breast cancer 01/19/2017 6/2015: Right lumpectomy. Radiation. No chemotherapy.    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     Current Outpatient Medications on File Prior to Encounter   Medication Sig    Ca comb no.1/vit D3/B6/FA/B12 (VITAMIN D3, CALCIUM CIT-PHOS, ORAL) Take by mouth.    dronedarone (MULTAQ) 400 mg Tab Take 1 tablet (400 mg total) by mouth 2 (two) times daily with meals.    ELIQUIS 5 mg Tab TAKE 1 TABLET TWICE DAILY    FLUoxetine 20 MG capsule Take 20 mg by mouth Daily.    hydroCHLOROthiazide (HYDRODIURIL) 25 MG tablet Take 1 tablet (25 mg total) by mouth once daily.    losartan (COZAAR) 100 MG tablet TAKE 1 TABLET EVERY DAY    multivitamin (THERAGRAN) per tablet Take 1 tablet by mouth once daily.    pravastatin (PRAVACHOL) 40 MG tablet Take 40 mg by mouth once daily.      Family History    None       Tobacco Use    Smoking status: Never    Smokeless tobacco: Never   Substance and Sexual Activity    Alcohol use: Not Currently    Drug use: Never    Sexual activity: Not on file     Review of Systems   All other systems reviewed and are negative.    Objective:     Vital Signs (Most Recent):  Temp: 98.6 °F (37 °C) (03/06/25 1200)  Pulse: (!) 127 (03/06/25 1200)  Resp: 19 (03/06/25 1200)  BP: (!) 159/86 (03/06/25 1205)  SpO2: 96 % (03/06/25 1200) Vital Signs (24h Range):  Temp:  [98.6 °F (37 °C)] 98.6 °F (37 °C)  Pulse:  [127] 127  Resp:  [19] 19  SpO2:  [96 %] 96 %  BP: (150-159)/(86-91)  159/86       Weight: 54.4 kg (120 lb)  Body mass index is 23.44 kg/m².    SpO2: 96 %        Physical Exam  Vitals and nursing note reviewed.   Constitutional:       Appearance: Normal appearance.   Cardiovascular:      Rate and Rhythm: Tachycardia present. Rhythm irregular.      Pulses: Normal pulses.      Heart sounds: Normal heart sounds.   Pulmonary:      Effort: Pulmonary effort is normal.      Breath sounds: Normal breath sounds.   Musculoskeletal:      Right lower leg: No edema.      Left lower leg: No edema.   Skin:     General: Skin is warm.   Neurological:      General: No focal deficit present.      Mental Status: She is alert and oriented to person, place, and time.            Significant Labs: All pertinent lab results from the last 24 hours have been reviewed.

## 2025-03-06 NOTE — ANESTHESIA PREPROCEDURE EVALUATION
"                                                                                                             03/06/2025  [facility]  Anesthesia Pre-Operative Evaluation         Patient Name: Stefany Dobbins  YOB: 1955  MRN: [id number]    SUBJECTIVE:     Pre-operative evaluation for Procedure(s) (LRB):  Ablation atrial fibrillation (N/A)  Transesophageal echo (KRISHNA) intra-procedure log documentation (N/A)     03/06/2025    Stefany Dobbins is a 69 y.o. female w/ a pertinent PMHx of pAF here for PFA.      Full medical history listed below      LDA: None documented.    Prev airway: None documented.     GTTs: None documented.        Problem List[1]    Review of patient's allergies indicates:   Allergen Reactions    Aspirin Other (See Comments)     Heart races    Caffeine Other (See Comments)     Heart races       Current Inpatient Medications:      Medications Ordered Prior to Encounter[2]    Past Surgical History:   Procedure Laterality Date    BREAST LUMPECTOMY Right        Social History[3]    OBJECTIVE:     Vital Signs Range (Last 24H):  Temp:  [37 °C (98.6 °F)]   Pulse:  [127]   Resp:  [19]   BP: (150-159)/(86-91)   SpO2:  [96 %]       CBC:   No results for input(s): "WBC", "RBC", "HGB", "HCT", "PLT", "MCV", "MCH", "MCHC" in the last 72 hours.    CMP: No results for input(s): "NA", "K", "CL", "CO2", "BUN", "CREATININE", "GLU", "MG", "PHOS", "CALCIUM", "ALBUMIN", "PROT", "ALKPHOS", "ALT", "AST", "BILITOT" in the last 72 hours.    INR:  No results for input(s): "PT", "INR", "PROTIME", "APTT" in the last 72 hours.    Diagnostic Studies: No relevant studies.    EKG:   Results for orders placed or performed in visit on 04/12/24   EKG 12-lead    Collection Time: 04/12/24 10:18 AM   Result Value Ref Range    QRS Duration 100 ms    [facility] QTC Calculation 457 ms    Narrative    Test Reason : I50.32,I48.0,    Vent. Rate : 055 BPM     Atrial Rate : 055 BPM     P-R Int : 200 ms          " QRS Dur : 100 ms      QT Int : 478 ms       P-R-T Axes : 071 -22 060 degrees     QTc Int : 457 ms    Sinus bradycardia  When compared with ECG of 06-JAN-2023 10:52,  No significant change was found  Confirmed by Ester NIEVES, Vargas ZIEGLER ([id number]) on 4/15/2024 2:44:28 PM    Referred By:  ESTER           Confirmed By:Vargas Norman MD        2D ECHO:   Results for orders placed during the hospital encounter of 08/10/20    Echo Color Flow Doppler? Yes    Interpretation Summary  · Normal left ventricular systolic function.  · Grade II (moderate) left ventricular diastolic dysfunction consistent with pseudonormalization.  · Moderate left atrial enlargement.  · Normal right ventricular systolic function.  · Mild mitral regurgitation.         ASSESSMENT/PLAN:           Pre-op Assessment    I have reviewed the Patient Summary Reports.     I have reviewed the Nursing Notes. I have reviewed the NPO Status.   I have reviewed the Medications.     Review of Systems  Anesthesia Hx:   History of prior surgery of interest to airway management or planning:          Denies Family Hx of Anesthesia complications.    Denies Personal Hx of Anesthesia complications.                        Physical Exam  General: Well nourished, Cooperative, Alert and Oriented    Airway:  Mallampati: II   Mouth Opening: Normal  TM Distance: Normal  Tongue: Normal  Neck ROM: Normal ROM    Dental:  Intact    Chest/Lungs:  Clear to auscultation, Normal Respiratory Rate    Heart:  Rate: Normal  Rhythm: Regular Rhythm        Anesthesia Plan  Type of Anesthesia, risks & benefits discussed:    Anesthesia Type: Gen ETT  Intra-op Monitoring Plan: Standard ASA Monitors and Art Line  Post Op Pain Control Plan: multimodal analgesia and IV/PO Opioids PRN  Induction:  IV  Airway Plan: Video  Informed Consent: Informed consent signed with the Patient and all parties understand the risks and agree with anesthesia plan.  All questions answered.   ASA Score: 3  Day of Surgery Review  of History & Physical: H&P Update referred to the surgeon/provider.    Ready For Surgery From Anesthesia Perspective.     .           [1]   Patient Active Problem List  Diagnosis    Essential hypertension    History of breast cancer    Diastolic heart failure    Supraventricular tachycardia    Paroxysmal atrial fibrillation    Superior mesenteric artery aneurysm    Nephrolithiasis    Tachy-char syndrome   [2]   No current facility-administered medications on file prior to encounter.     Current Outpatient Medications on File Prior to Encounter   Medication Sig Dispense Refill    Ca comb no.1/vit D3/B6/FA/B12 (VITAMIN D3, CALCIUM CIT-PHOS, ORAL) Take by mouth.      dronedarone (MULTAQ) 400 mg Tab Take 1 tablet (400 mg total) by mouth 2 (two) times daily with meals. 60 tablet 11    ELIQUIS 5 mg Tab TAKE 1 TABLET TWICE DAILY 180 tablet 3    FLUoxetine 20 MG capsule Take 20 mg by mouth Daily.      hydroCHLOROthiazide (HYDRODIURIL) 25 MG tablet Take 1 tablet (25 mg total) by mouth once daily. 90 tablet 3    losartan (COZAAR) 100 MG tablet TAKE 1 TABLET EVERY DAY 90 tablet 3    multivitamin (THERAGRAN) per tablet Take 1 tablet by mouth once daily.      pravastatin (PRAVACHOL) 40 MG tablet Take 40 mg by mouth once daily.      [3]   Social History  Socioeconomic History    Marital status:    Tobacco Use    Smoking status: Never    Smokeless tobacco: Never   Substance and Sexual Activity    Alcohol use: Not Currently    Drug use: Never     Social Drivers of Health     Financial Resource Strain: Low Risk  (10/15/2024)    Overall Financial Resource Strain (CARDIA)     Difficulty of Paying Living Expenses: Not hard at all   Food Insecurity: No Food Insecurity (10/15/2024)    Hunger Vital Sign     Worried About Running Out of Food in the Last Year: Never true     Ran Out of Food in the Last Year: Never true   Transportation Needs: No Transportation Needs (4/14/2024)    PRAPARE - Transportation     Lack of Transportation  (Medical): No     Lack of Transportation (Non-Medical): No   Physical Activity: Insufficiently Active (10/15/2024)    Exercise Vital Sign     Days of Exercise per Week: 1 day     Minutes of Exercise per Session: 20 min   Stress: Stress Concern Present (10/15/2024)    [employer] - Occupational Stress Questionnaire     Feeling of Stress : To some extent   Housing Stability: Unknown (10/15/2024)    Housing Stability Vital Sign     Unable to Pay for Housing in the Last Year: No

## 2025-03-06 NOTE — H&P
[facility] Cardiac [facility]  Cardiac Electrophysiology  History and Physical     Admission Date: 3/6/2025  Code Status: No Order   Attending Provider: Gurpreet Antonio MD   Principal Problem:Paroxysmal atrial fibrillation    Subjective:     Chief Complaint:  AF     HPI:  Stefany Dobbins is a 69 y.o. female with atrial fibrillation here for PVI with PFA.     History of atrial fibrillation, Htn, HFpEF, Nephrolithiasis, Breast cancer (rt-sided lumpectomy + XRT), essential tremors  Has had paroxysmal symptomatic atrial fibrillation, for roughly 5 years, on Flecainide for roughly 3 years. Now having breakthrough on Flecainide 100 mg BID.     Event monitor recently obtained. Reveals paroxysmal atrial fibrillation with post conversion pauses of up to 3.4 seconds.     Exercise echo 4/15/24: functional capacity of 7METS. Study is negative for ischemia. EF normal    On Multaq and Eliquis    ECG: AF, HR 120s       Past Medical History:   Diagnosis Date    Breast cancer 01/19/2017 6/2015: Right lumpectomy. Radiation. No chemotherapy.    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     Current Outpatient Medications on File Prior to Encounter   Medication Sig    Ca comb no.1/vit D3/B6/FA/B12 (VITAMIN D3, CALCIUM CIT-PHOS, ORAL) Take by mouth.    dronedarone (MULTAQ) 400 mg Tab Take 1 tablet (400 mg total) by mouth 2 (two) times daily with meals.    ELIQUIS 5 mg Tab TAKE 1 TABLET TWICE DAILY    FLUoxetine 20 MG capsule Take 20 mg by mouth Daily.     hydroCHLOROthiazide (HYDRODIURIL) 25 MG tablet Take 1 tablet (25 mg total) by mouth once daily.    losartan (COZAAR) 100 MG tablet TAKE 1 TABLET EVERY DAY    multivitamin (THERAGRAN) per tablet Take 1 tablet by mouth once daily.    pravastatin (PRAVACHOL) 40 MG tablet Take 40 mg by mouth once daily.      Family History    None       Tobacco Use    Smoking status: Never    Smokeless tobacco: Never   Substance and Sexual Activity    Alcohol use: Not Currently    Drug use: Never    Sexual activity: Not on file     Review of Systems   All other systems reviewed and are negative.    Objective:     Vital Signs (Most Recent):  Temp: 98.6 °F (37 °C) (03/06/25 1200)  Pulse: (!) 127 (03/06/25 1200)  Resp: 19 (03/06/25 1200)  BP: (!) 159/86 (03/06/25 1205)  SpO2: 96 % (03/06/25 1200) Vital Signs (24h Range):  Temp:  [98.6 °F (37 °C)] 98.6 °F (37 °C)  Pulse:  [127] 127  Resp:  [19] 19  SpO2:  [96 %] 96 %  BP: (150-159)/(86-91) 159/86       Weight: 54.4 kg (120 lb)  Body mass index is 23.44 kg/m².    SpO2: 96 %        Physical Exam  Vitals and nursing note reviewed.   Constitutional:       Appearance: Normal appearance.   Cardiovascular:      Rate and Rhythm: Tachycardia present. Rhythm irregular.      Pulses: Normal pulses.      Heart sounds: Normal heart sounds.   Pulmonary:      Effort: Pulmonary effort is normal.      Breath sounds: Normal breath sounds.   Musculoskeletal:      Right lower leg: No edema.      Left lower leg: No edema.   Skin:     General: Skin is warm.   Neurological:      General: No focal deficit present.      Mental Status: She is alert and oriented to person, place, and time.            Significant Labs: All pertinent lab results from the last 24 hours have been reviewed.      Assessment and Plan:     * Paroxysmal atrial fibrillation  In summary, Stefany Dobbins is a 69 y.o. female with atrial fibrillation here for PVI with PFA. She has a history of atrial fibrillation with breakthrough episodes  despite AAD (currently on Multaq), Htn, HFpEF, Nephrolithiasis, Breast cancer (rt-sided lumpectomy + XRT), essential tremors.    KRISHNA    Our discussion included, but was not limited to the risk of death, infection, bleeding, stroke, MI, cardiac perforation, embolism, cardiac tamponade, vascular injury, valvular injury, AE fistula (RFA), injury to phrenic nerve (RFA), pulmonary vein stenosis (RFA), rhabdomyolysis (PFA), hemolysis (PFA) and other organic injury including the possibility for need for surgery or pacemaker implantation. Patient verbalized understanding and wishes to proceed. All questions and concerns were answered. Consents signed.           Wilda Duncan MD  Cardiac Electrophysiology  [facility]

## 2025-03-06 NOTE — ANESTHESIA POSTPROCEDURE EVALUATION
Anesthesia Post Evaluation    Patient: Stefany Dobbins    Procedure(s) Performed: Procedure(s) (LRB):  Ablation atrial fibrillation (N/A)  Transesophageal echo (KRISHNA) intra-procedure log documentation (N/A)    Final Anesthesia Type: general      Patient location during evaluation: PACU  Patient participation: Yes- Able to Participate  Level of consciousness: awake and alert and oriented  Post-procedure vital signs: reviewed and stable  Pain management: adequate  Airway patency: patent  ANDREW mitigation strategies: Intraoperative administration of CPAP, nasopharyngeal airway, or oral appliance during sedation, Multimodal analgesia, Extubation while patient is awake, Verification of full reversal of neuromuscular block and Extubation and recovery carried out in lateral, semiupright, or other nonsupine position  PONV status at discharge: No PONV  Anesthetic complications: no      Cardiovascular status: hemodynamically stable  Respiratory status: unassisted  Hydration status: euvolemic  Follow-up not needed.              Vitals Value Taken Time   /72 03/06/25 16:30   Temp 36.4 °C (97.5 °F) 03/06/25 16:00   Pulse 68 03/06/25 16:31   Resp 20 03/06/25 16:30   SpO2 96 % 03/06/25 16:31   Vitals shown include unfiled device data.      No case tracking events are documented in the log.      Pain/Farrah Score: Pain Rating Prior to Med Admin: 0 (3/6/2025  4:15 PM)  Farrah Score: 9 (3/6/2025  3:54 PM)

## 2025-03-06 NOTE — PLAN OF CARE
Patient received to SSCU room 9 accompanied by spouse. Patient aaox4. Vss. No complaints. Pre procedure admission completed. Orders on chart carried out. PIV started. Plan of care reviewed with patient and family. All questions answered. Call light placed within reach.

## 2025-03-06 NOTE — TRANSFER OF CARE
Anesthesia Transfer of Care Note    Patient: Stefany Dobbins    Procedure(s) Performed: Procedure(s) (LRB):  Ablation atrial fibrillation (N/A)  Transesophageal echo (KRISHNA) intra-procedure log documentation (N/A)    Patient location: Cath Lab    Anesthesia Type: general    Transport from OR: Transported from OR on 6-10 L/min O2 by face mask with adequate spontaneous ventilation    Post pain: adequate analgesia    Post assessment: no apparent anesthetic complications    Post vital signs: stable    Level of consciousness: sedated    Nausea/Vomiting: no nausea/vomiting    Complications: none    Transfer of care protocol was followed      Last vitals: Visit Vitals  BP (!) 159/86 (BP Location: Left arm, Patient Position: Lying)   Pulse (!) 127   Temp 37 °C (98.6 °F) (Temporal)   Resp 19   Ht 5' (1.524 m)   Wt 54.4 kg (120 lb)   SpO2 96%   Breastfeeding No   BMI 23.44 kg/m²

## 2025-03-06 NOTE — ASSESSMENT & PLAN NOTE
Here today for KRISHNA to evaluate for intracardiac thrombus prior to PVI  -No absolute contraindications of esophageal stricture, tumor, perforation, laceration,or diverticulum and/or active GI bleed  -The risks, benefits & alternatives of the procedure were explained to the patient.   -The risks of transesophageal echo include but are not limited to:  Dental trauma, esophageal trauma/perforation, bleeding, laryngospasm/brochospasm, aspiration, sore throat/hoarseness, & dislodgement of the endotracheal tube/nasogastric tube (where applicable).    -The risks of ANES monitored sedation include hypotension, respiratory depression, arrhythmias, bronchospasm, & death.    -Informed consent was obtained. The patient is agreeable to proceed with the procedure and all questions and concerns addressed.    Case discussed with an attending in echocardiography lab.    Further recommendations per attending addendum

## 2025-03-06 NOTE — SUBJECTIVE & OBJECTIVE
Past Medical History:   Diagnosis Date    Breast cancer 01/19/2017 6/2015: Right lumpectomy. Radiation. No chemotherapy.    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     Current Outpatient Medications on File Prior to Encounter   Medication Sig    Ca comb no.1/vit D3/B6/FA/B12 (VITAMIN D3, CALCIUM CIT-PHOS, ORAL) Take by mouth.    dronedarone (MULTAQ) 400 mg Tab Take 1 tablet (400 mg total) by mouth 2 (two) times daily with meals.    ELIQUIS 5 mg Tab TAKE 1 TABLET TWICE DAILY    FLUoxetine 20 MG capsule Take 20 mg by mouth Daily.    hydroCHLOROthiazide (HYDRODIURIL) 25 MG tablet Take 1 tablet (25 mg total) by mouth once daily.    losartan (COZAAR) 100 MG tablet TAKE 1 TABLET EVERY DAY    multivitamin (THERAGRAN) per tablet Take 1 tablet by mouth once daily.    pravastatin (PRAVACHOL) 40 MG tablet Take 40 mg by mouth once daily.      Family History    None       Tobacco Use    Smoking status: Never    Smokeless tobacco: Never   Substance and Sexual Activity    Alcohol use: Not Currently    Drug use: Never    Sexual activity: Not on file     Review of Systems   Constitutional: Negative for diaphoresis, malaise/fatigue, weight gain and weight loss.   HENT:  Negative for nosebleeds.    Eyes:  Negative for vision loss in left eye, vision loss in right eye and visual disturbance.   Cardiovascular:  Negative for chest pain, claudication, dyspnea on exertion, irregular heartbeat, leg swelling, near-syncope, orthopnea, palpitations, paroxysmal nocturnal  dyspnea and syncope.   Respiratory:  Negative for cough, shortness of breath, sleep disturbances due to breathing, snoring and wheezing.    Hematologic/Lymphatic: Negative for bleeding problem. Does not bruise/bleed easily.   Skin:  Negative for poor wound healing and rash.   Musculoskeletal:  Negative for muscle cramps and myalgias.   Gastrointestinal:  Negative for bloating, abdominal pain, diarrhea, heartburn, melena, nausea and vomiting.   Genitourinary:  Negative for hematuria and nocturia.   Neurological:  Negative for brief paralysis, dizziness, headaches, light-headedness, numbness and weakness.   Psychiatric/Behavioral:  Negative for depression.    Allergic/Immunologic: Negative for hives.     Objective:     Vital Signs (Most Recent):    Vital Signs (24h Range):           There is no height or weight on file to calculate BMI.            No intake or output data in the 24 hours ending 03/06/25 1158    Lines/Drains/Airways       None                    Physical Exam  Vitals and nursing note reviewed.   Constitutional:       Appearance: She is well-developed. She is not diaphoretic.   HENT:      Head: Normocephalic and atraumatic.   Eyes:      Conjunctiva/sclera: Conjunctivae normal.   Neck:      Vascular: No carotid bruit or JVD.   Cardiovascular:      Rate and Rhythm: Tachycardia present. Rhythm irregular.      Pulses: Normal pulses and intact distal pulses.      Heart sounds: Normal heart sounds. No murmur heard.     No friction rub. No gallop.   Pulmonary:      Effort: Pulmonary effort is normal.      Breath sounds: Normal breath sounds. No rales.   Chest:      Chest wall: No tenderness.   Abdominal:      General: There is no distension.      Palpations: Abdomen is soft. There is no mass.      Tenderness: There is no abdominal tenderness.   Skin:     General: Skin is warm and dry.      Coloration: Skin is not pale.   Neurological:      Mental Status: She is alert and oriented to person, place, and time.           Significant Labs: All pertinent lab results from the last 24 hours have been reviewed.

## 2025-03-06 NOTE — CONSULTS
[facility]  Cardiology  KRISHNA Consult Note    Patient Name: Stefany Dobbins  MRN: [id number]  Admission Date: 3/6/2025  Hospital Length of Stay: 0 days  Code Status: No Order   Attending Provider: Gurpreet Antonio MD   Consulting Provider: Taniya Rabago PA-C  Primary Care Physician: Kasey Burgos MD  Principal Problem:<principal problem not specified>    Patient information was obtained from patient and past medical records.     Consults  Subjective:     Chief Complaint:  Atrial fibrillation      HPI:   Ms Dobbins is a 70 yo female with a PMHx significant for atrial fibrillation, HTN, HFpEF, and breast cancer (rt-sided lumpectomy + XRT). She has had paroxysmal symptomatic atrial fibrillation, for roughly 5 years, on Flecainide for roughly 3 years. She has been having breakthrough episodes on Flecainide 100 mg BID. Recent event monitor reveals paroxysmal atrial fibrillation with post conversion pauses of up to 3.4 seconds. She was switched from Flecainide to Multaq. She presents today for KRISHNA and PVI.     Exercise stress echo 4/15/24    Left Ventricle: There is normal systolic function.    Stress Protocol: The patient exercised for 5 minutes 1 seconds on a Don protocol, corresponding to a functional capacity of 7METS, achieving a peak heart rate of 117 bpm, which is 81% of the age predicted maximum heart rate. Their exercise capacity was average. The patient reported shortness of breath during the stress test. The test was stopped because the patient requested it and they experienced shortness of breath. Patient having a hard time keeping up with treadmill.    Baseline ECG: The Baseline ECG reveals sinus rhythm. The axis is normal. The ST segments are normal.    Stress ECG: There are no ST segment deviation identified during the protocol. There are no arrhythmias during stress. There is normal blood pressure response with stress.    ECG Conclusion: The ECG portion of the study  is negative for ischemia.    Post-stress Echo: The left ventricle systolic function is hyperdynamic.    Post-stress Impression: The study is normal and negative with no echocardiographic evidence of stress induced ischemia.    Anticoagulant/antiplatelets: Eliquis 5 mg bid   ECG: AF with RVR    Dysphagia or odynophagia:  No  Liver Disease, esophageal disease, or known varices:  No  Upper GI Bleeding: No  Snoring:  No  Sleep Apnea:  No  Prior neck surgery or radiation:  No  Able to move neck in all directions:  Yes  History of anesthetic difficulties:  No  Family history of anesthetic difficulties:  No  Last oral intake: yesterday before midnight  GLP-1 use: None    Platelet count: 261k  INR: 1.0      Past Medical History:   Diagnosis Date    Breast cancer 01/19/2017 6/2015: Right lumpectomy. Radiation. No chemotherapy.    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     Current Outpatient Medications on File Prior to Encounter   Medication Sig    Ca comb no.1/vit D3/B6/FA/B12 (VITAMIN D3, CALCIUM CIT-PHOS, ORAL) Take by mouth.    dronedarone (MULTAQ) 400 mg Tab Take 1 tablet (400 mg total) by mouth 2 (two) times daily with meals.    ELIQUIS 5 mg Tab TAKE 1 TABLET TWICE DAILY    FLUoxetine 20 MG capsule Take 20 mg by mouth Daily.    hydroCHLOROthiazide (HYDRODIURIL) 25 MG tablet Take 1 tablet (25 mg total) by mouth once daily.    losartan (COZAAR) 100 MG tablet TAKE 1 TABLET EVERY DAY    multivitamin (THERAGRAN) per tablet Take 1 tablet by mouth once  daily.    pravastatin (PRAVACHOL) 40 MG tablet Take 40 mg by mouth once daily.      Family History    None       Tobacco Use    Smoking status: Never    Smokeless tobacco: Never   Substance and Sexual Activity    Alcohol use: Not Currently    Drug use: Never    Sexual activity: Not on file     Review of Systems   Constitutional: Negative for diaphoresis, malaise/fatigue, weight gain and weight loss.   HENT:  Negative for nosebleeds.    Eyes:  Negative for vision loss in left eye, vision loss in right eye and visual disturbance.   Cardiovascular:  Negative for chest pain, claudication, dyspnea on exertion, irregular heartbeat, leg swelling, near-syncope, orthopnea, palpitations, paroxysmal nocturnal dyspnea and syncope.   Respiratory:  Negative for cough, shortness of breath, sleep disturbances due to breathing, snoring and wheezing.    Hematologic/Lymphatic: Negative for bleeding problem. Does not bruise/bleed easily.   Skin:  Negative for poor wound healing and rash.   Musculoskeletal:  Negative for muscle cramps and myalgias.   Gastrointestinal:  Negative for bloating, abdominal pain, diarrhea, heartburn, melena, nausea and vomiting.   Genitourinary:  Negative for hematuria and nocturia.   Neurological:  Negative for brief paralysis, dizziness, headaches, light-headedness, numbness and weakness.   Psychiatric/Behavioral:  Negative for depression.    Allergic/Immunologic: Negative for hives.     Objective:     Vital Signs (Most Recent):    Vital Signs (24h Range):           There is no height or weight on file to calculate BMI.            No intake or output data in the 24 hours ending 03/06/25 1158    Lines/Drains/Airways       None                    Physical Exam  Vitals and nursing note reviewed.   Constitutional:       Appearance: She is well-developed. She is not diaphoretic.   HENT:      Head: Normocephalic and atraumatic.   Eyes:      Conjunctiva/sclera: Conjunctivae normal.   Neck:      Vascular: No  carotid bruit or JVD.   Cardiovascular:      Rate and Rhythm: Tachycardia present. Rhythm irregular.      Pulses: Normal pulses and intact distal pulses.      Heart sounds: Normal heart sounds. No murmur heard.     No friction rub. No gallop.   Pulmonary:      Effort: Pulmonary effort is normal.      Breath sounds: Normal breath sounds. No rales.   Chest:      Chest wall: No tenderness.   Abdominal:      General: There is no distension.      Palpations: Abdomen is soft. There is no mass.      Tenderness: There is no abdominal tenderness.   Skin:     General: Skin is warm and dry.      Coloration: Skin is not pale.   Neurological:      Mental Status: She is alert and oriented to person, place, and time.          Significant Labs: All pertinent lab results from the last 24 hours have been reviewed.    Assessment and Plan:     Paroxysmal atrial fibrillation  Here today for KRISHNA to evaluate for intracardiac thrombus prior to PVI  -No absolute contraindications of esophageal stricture, tumor, perforation, laceration,or diverticulum and/or active GI bleed  -The risks, benefits & alternatives of the procedure were explained to the patient.   -The risks of transesophageal echo include but are not limited to:  Dental trauma, esophageal trauma/perforation, bleeding, laryngospasm/brochospasm, aspiration, sore throat/hoarseness, & dislodgement of the endotracheal tube/nasogastric tube (where applicable).    -The risks of ANES monitored sedation include hypotension, respiratory depression, arrhythmias, bronchospasm, & death.    -Informed consent was obtained. The patient is agreeable to proceed with the procedure and all questions and concerns addressed.    Case discussed with an attending in echocardiography lab.    Further recommendations per attending addendum         VTE Risk Mitigation (From admission, onward)      None            Thank you for your consult. I will sign off. Please contact us if you have any additional  questions.    Taniya Rabago PA-C  Cardiology   [facility]

## 2025-03-06 NOTE — BRIEF OP NOTE
"Atropine and heparin were administered after access was obtained and short sheaths were placed in femoral veins bilaterally.   ICE survey at the beginning of the case showed no significant pericardial effusion    Transseptal puncture performed with BRK needle and SL-1 sheath. Left atrial mapping performed through SL-1 sheath. After mapping was completed, the SL-1 sheath was removed over an Amplatz wire, and the Farapulse sheath was placed into the left atrium over the Amplatz wire. The dilator and wire were removed. The Farapulse catheter was placed through the sheath into the left atrium.  PVI was performed using a [facility] PFA catheter, with each vein receiving 4 applications in the basket configuration and 4 applications in the flow configuration. This resulted in acute isolation of all 4 pulmonary veins.  During PVI, we delivered "anchor" lesions at the posterior aspect of each vein, and then in the flower configuration we performed ablation of the entire posterior wall, resulting in posterior wall isolation. This was performed for CFAE ablation.  Following ablation, we repeated 3D electroanatomic mapping of the left atrium.  All 4 pulmonary veins and the posterior wall displayed entrance and exit block    Post-ablation rhythm was normal sinus rhythm  Post-ablation ICE assessment was then performed which did not show a pericardial effusion.  All sheaths removed. Figure of 8 suture placed in each groin to maintain hemostasis.    Wilda Duncan MD PGY 8  Electrophysiology    "

## 2025-03-06 NOTE — NURSING TRANSFER
Nursing Transfer Note      3/6/2025   4:44 PM    Nurse giving handoff:kongrn   Nurse receiving handoff:cuco sscu rn    Reason patient is being transferred: ep pacu 2 to sscu 3/home    Transfer To: ep pacu 2 to sscu 3/home    Transfer via stretcher    Transfer with cardiac monitoring, tele box on confirmed by tele tech    Transported by renzo pacu pct    Transfer Vital Signs:  Blood Pressure:159/72  Heart Rate:68  O2:95% room air  Temperature:97.5  Respirations:20    Telemetry: Box Number [id number], Rate 68, Rhythm sr, and Telemetry  hannah  Order for Tele Monitor? Yes    Additional Lines: none    Medicines sent: none    Any special needs or follow-up needed: bedrest x4hrs. Sutures/stopcock removal at 1830. Bedrest done at 1930    Patient belongings transferred with patient: sscu locker    Chart send with patient: Yes    Notified: spouse    Patient reassessed at: 3/6/25 1630. Next due 1700  Upon arrival to floor: cardiac monitor applied, patient oriented to room, call bell in reach, and bed in lowest position, groin checks done with sscu rn upon arrival

## 2025-03-06 NOTE — ASSESSMENT & PLAN NOTE
In summary, Stefany Dobbins is a 69 y.o. female with atrial fibrillation here for PVI with PFA. She has a history of atrial fibrillation with breakthrough episodes despite AAD (currently on Multaq), Htn, HFpEF, Nephrolithiasis, Breast cancer (rt-sided lumpectomy + XRT), essential tremors.    KRISHNA    Our discussion included, but was not limited to the risk of death, infection, bleeding, stroke, MI, cardiac perforation, embolism, cardiac tamponade, vascular injury, valvular injury, AE fistula (RFA), injury to phrenic nerve (RFA), pulmonary vein stenosis (RFA), rhabdomyolysis (PFA), hemolysis (PFA) and other organic injury including the possibility for need for surgery or pacemaker implantation. Patient verbalized understanding and wishes to proceed. All questions and concerns were answered. Consents signed.

## 2025-03-06 NOTE — PLAN OF CARE
S/p afib ablation. Pt arrived to ep pacu 2 with nomi groins sutures/stopcock x1 each groin. Monique, well approx. Sutures placed at 1530. Removal time at 1830. Bedrest done at 1930. Palpable pulses noted. 12 lead EKG done and in chart per md order. Pt denies pain, sob, or bladder discomfort. Right arm limb alert in place. Pt did not want anything to drink. Nomi blanket rolls placed under nomi knees. Pt's  updated over text messaging system and reupdated by ep pacu rn. Dr michele and dr ayala ep fellow both updated pt in ep pacu 2. Verbalizes understanding. See flowsheet for full assessment. Due to void later. Pt aaox4 follows commands.

## 2025-03-06 NOTE — HPI
Ms Dobbins is a 70 yo female with a PMHx significant for atrial fibrillation, HTN, HFpEF, and breast cancer (rt-sided lumpectomy + XRT). She has had paroxysmal symptomatic atrial fibrillation, for roughly 5 years, on Flecainide for roughly 3 years. She has been having breakthrough episodes on Flecainide 100 mg BID. Recent event monitor reveals paroxysmal atrial fibrillation with post conversion pauses of up to 3.4 seconds. She was switched from Flecainide to Multaq. She presents today for KRISHNA and PVI.     Exercise stress echo 4/15/24    Left Ventricle: There is normal systolic function.    Stress Protocol: The patient exercised for 5 minutes 1 seconds on a Don protocol, corresponding to a functional capacity of 7METS, achieving a peak heart rate of 117 bpm, which is 81% of the age predicted maximum heart rate. Their exercise capacity was average. The patient reported shortness of breath during the stress test. The test was stopped because the patient requested it and they experienced shortness of breath. Patient having a hard time keeping up with treadmill.    Baseline ECG: The Baseline ECG reveals sinus rhythm. The axis is normal. The ST segments are normal.    Stress ECG: There are no ST segment deviation identified during the protocol. There are no arrhythmias during stress. There is normal blood pressure response with stress.    ECG Conclusion: The ECG portion of the study is negative for ischemia.    Post-stress Echo: The left ventricle systolic function is hyperdynamic.    Post-stress Impression: The study is normal and negative with no echocardiographic evidence of stress induced ischemia.    Anticoagulant/antiplatelets: Eliquis 5 mg bid   ECG: AF with RVR    Dysphagia or odynophagia:  No  Liver Disease, esophageal disease, or known varices:  No  Upper GI Bleeding: No  Snoring:  No  Sleep Apnea:  No  Prior neck surgery or radiation:  No  Able to move neck in all directions:  Yes  History of anesthetic  difficulties:  No  Family history of anesthetic difficulties:  No  Last oral intake: yesterday before midnight  GLP-1 use: None    Platelet count: 261k  INR: 1.0

## 2025-03-07 NOTE — PLAN OF CARE
Patient received back to SSCU room 3 post procedure. Report received from Columba Wheat RN. Patient is AAOx3. VSS. Tele monitor in place. Bedrest instructions reviewed with patient. All questions answered. Bilateral groin with suture  and stopcock in place. No bleeding noted. No hematoma. + pedal pulses palpable. Purwik placed during bedrest. Spouse notified and to bedside. Call light placed within reach.

## 2025-03-07 NOTE — PLAN OF CARE
Patient with bleeding and hematoma to right groin. Manual pressure applied for 15 minutes. Vss. Dr. Gillies aware. Will ambulate patient upon bedrest completion.

## 2025-05-29 NOTE — PROGRESS NOTES
Ms. Dobbins is a patient of Dr. Antonio and was last seen in clinic 12/27/2024.      Subjective:   Patient ID:  Stefany Dobbins is a 69 y.o. female who presents for follow up of Atrial Fibrillation  .     HPI:    Ms. Dobbins is a 69 y.o. female with atrial fibrillation, Htn, HFpEF, Nephrolithiasis, Breast cancer (rt-sided lumpectomy + XRT), essential tremor here for follow up after ablation.    Background:    Primary cardiologist is Dr. Norman.  She is dear friends with Rachel Norman.  Has had paroxysmal symptomatic atrial fibrillation, for roughly 5 years, on Flecainide for roughly 3 years.  Having breakthrough on Flecainide 100 mg BID.    Symptoms are palpitations and feeling poorly. Denies chest pain, shortness of breath.  Notes occasional fatigue, denies syncope or dizziness.  Event monitor recently obtained. Reveals paroxysmal atrial fibrillation with post conversion pauses of up to 3.4 seconds.    12/27/2024: Paroxysmal atrial fibrillation, with breakthrough on Flecainide. Also having post conversion pauses of up to 3.4 seconds.  Recommend PVI.  Risks and benefits of PVI discussed, she would like to proceed.  PFA.  KRISHNA day of procedure, cancel if in nsr.  Hold Eliquis morning of procedures.  Given the pauses, in interim, switch from Flecainide to Multaq. Hold Multaq 3 days prior.       Update (06/09/2025):    3/6/2025:    Successful pulmonary vein RF ablation.    Posterior wall isolation.    Today she says she continues to feel palpitations since her procedure. Unsure if % has changed. Vertigo 2 days ago that has improved somewhat. Also episodes of LH and loss of balance. No syncope. No CP. No worsening FLORES.    She is currently taking eliquis 5mg BID for stroke prophylaxis and denies significant bleeding episodes. She is currently being treated with multaq 400mg BID for rhythm control.  Kidney function is stable, with a creatinine of 0.7 on 2/26/2025.    I have personally reviewed the patient's EKG today,  which shows SR with PAC at 51bpm. OR interval is 138. QRS is 86. QT is 484.    Relevant Cardiac Test Results:    KRISHNA (3/6/2025):    KRISHNA performed prior to ablation.    Left Ventricle: The left ventricle is normal in size. Normal wall thickness. There is normal systolic function with a visually estimated ejection fraction of 55 - 60%.    Right Ventricle: The right ventricle is normal in size. Wall thickness is normal. Systolic function is normal.    Left Atrium: The left atrium is dilated. The left atrial appendage appears normal. Appendage velocity is normal at greater than 40 cm/sec. There is no thrombus in the cavity.    Mitral Valve: There is mild regurgitation.    Aorta: Grade 2 atherosclerosis of the descending aorta.    Current Outpatient Medications   Medication Sig    Ca comb no.1/vit D3/B6/FA/B12 (VITAMIN D3, CALCIUM CIT-PHOS, ORAL) Take by mouth.    dronedarone (MULTAQ) 400 mg Tab Take 1 tablet (400 mg total) by mouth 2 (two) times daily with meals.    ELIQUIS 5 mg Tab TAKE 1 TABLET TWICE DAILY    FLUoxetine 20 MG capsule Take 20 mg by mouth Daily.    hydroCHLOROthiazide (HYDRODIURIL) 25 MG tablet Take 1 tablet (25 mg total) by mouth once daily.    losartan (COZAAR) 100 MG tablet TAKE 1 TABLET EVERY DAY    multivitamin (THERAGRAN) per tablet Take 1 tablet by mouth once daily.    pravastatin (PRAVACHOL) 40 MG tablet Take 40 mg by mouth once daily.      No current facility-administered medications for this visit.       Review of Systems   Constitutional: Negative for malaise/fatigue.   Cardiovascular:  Positive for irregular heartbeat and palpitations. Negative for chest pain, dyspnea on exertion and leg swelling.   Respiratory:  Negative for shortness of breath.    Hematologic/Lymphatic: Negative for bleeding problem.   Skin:  Negative for rash.   Musculoskeletal:  Negative for myalgias.   Gastrointestinal:  Negative for hematemesis, hematochezia and nausea.   Genitourinary:  Negative for hematuria.    Neurological:  Positive for light-headedness, loss of balance and vertigo.   Psychiatric/Behavioral:  Negative for altered mental status.    Allergic/Immunologic: Negative for persistent infections.       Objective:          BP (!) 146/72 (Patient Position: Sitting)   Pulse (!) 51   Wt 55.6 kg (122 lb 9.2 oz)   BMI 23.94 kg/m²     Physical Exam  Vitals and nursing note reviewed.   Constitutional:       Appearance: Normal appearance. She is well-developed.   HENT:      Head: Normocephalic.      Nose: Nose normal.   Eyes:      Pupils: Pupils are equal, round, and reactive to light.   Cardiovascular:      Rate and Rhythm: Regular rhythm. Bradycardia present.   Pulmonary:      Effort: No respiratory distress.   Musculoskeletal:         General: Normal range of motion.   Skin:     General: Skin is warm and dry.      Findings: No erythema.   Neurological:      Mental Status: She is alert and oriented to person, place, and time.   Psychiatric:         Speech: Speech normal.         Behavior: Behavior normal.           Lab Results   Component Value Date     02/26/2025    K 4.1 02/26/2025    MG 1.9 11/18/2022    BUN 25 02/26/2025    CREATININE 0.70 02/26/2025    ALT 11 11/18/2022    AST 19 11/18/2022    HGB 13.2 02/26/2025    HCT 39.1 02/26/2025    TSH 1.42 09/27/2022    LDLCALC 68 09/27/2022       Recent Labs   Lab 02/26/25  1311   INR 1.0       Assessment:     1. Paroxysmal atrial fibrillation    2. Essential hypertension    3. Tachy-char syndrome    4. On Multaq therapy    5. On anticoagulant therapy        Plan:     In summary, Ms. Dobbins is a 69 y.o. female with atrial fibrillation, Htn, HFpEF, Nephrolithiasis, Breast cancer (rt-sided lumpectomy + XRT), essential tremor here for follow up after ablation.  She is 3 mo s/p PVI and PWI. Unfortunately continues to experience symptoms, including palpitations and some LH. Hx of conversion pauses. No syncope. Will update monitor now that she is out of the blanking  period. Continue multaq for now. Discussed possibility of redo ablation in the future. Continue eliquis for CVA prophylaxis.      14 day Holter  Continue current meds for now  RTC after testing    *A copy of this note has been sent to Dr. Antonio*    Follow up after testing.      ------------------------------------------------------------------    FREDDY Sesay, NP-C  Cardiac Electrophysiology

## 2025-06-06 ENCOUNTER — OFFICE VISIT (OUTPATIENT)
Dept: CARDIOLOGY | Facility: CLINIC | Age: 70
End: 2025-06-06
Payer: MEDICARE

## 2025-06-06 VITALS
OXYGEN SATURATION: 98 % | SYSTOLIC BLOOD PRESSURE: 136 MMHG | DIASTOLIC BLOOD PRESSURE: 72 MMHG | WEIGHT: 123.5 LBS | BODY MASS INDEX: 24.25 KG/M2 | HEIGHT: 60 IN | HEART RATE: 60 BPM

## 2025-06-06 DIAGNOSIS — I48.0 PAROXYSMAL ATRIAL FIBRILLATION: Primary | ICD-10-CM

## 2025-06-06 DIAGNOSIS — I50.32 CHRONIC DIASTOLIC HEART FAILURE: ICD-10-CM

## 2025-06-06 PROCEDURE — 99999 PR PBB SHADOW E&M-EST. PATIENT-LVL III: CPT | Mod: PBBFAC,,, | Performed by: INTERNAL MEDICINE

## 2025-06-09 ENCOUNTER — OFFICE VISIT (OUTPATIENT)
Dept: ELECTROPHYSIOLOGY | Facility: CLINIC | Age: 70
End: 2025-06-09
Payer: MEDICARE

## 2025-06-09 ENCOUNTER — HOSPITAL ENCOUNTER (OUTPATIENT)
Dept: CARDIOLOGY | Facility: CLINIC | Age: 70
Discharge: HOME | End: 2025-06-09
Payer: MEDICARE

## 2025-06-09 VITALS
SYSTOLIC BLOOD PRESSURE: 146 MMHG | WEIGHT: 122.56 LBS | HEART RATE: 51 BPM | DIASTOLIC BLOOD PRESSURE: 72 MMHG | BODY MASS INDEX: 23.94 KG/M2

## 2025-06-09 DIAGNOSIS — I48.0 PAROXYSMAL ATRIAL FIBRILLATION: Primary | ICD-10-CM

## 2025-06-09 DIAGNOSIS — Z79.01 ON ANTICOAGULANT THERAPY: ICD-10-CM

## 2025-06-09 DIAGNOSIS — I10 ESSENTIAL HYPERTENSION: ICD-10-CM

## 2025-06-09 DIAGNOSIS — Z79.899 ON MULTAQ THERAPY: ICD-10-CM

## 2025-06-09 DIAGNOSIS — I49.5 TACHY-BRADY SYNDROME: ICD-10-CM

## 2025-06-09 DIAGNOSIS — I47.10 SUPRAVENTRICULAR TACHYCARDIA: ICD-10-CM

## 2025-06-09 LAB
OHS QRS DURATION: 86 MS
OHS QTC CALCULATION: 446 MS

## 2025-06-09 PROCEDURE — 1101F PT FALLS ASSESS-DOCD LE1/YR: CPT | Mod: CPTII,S$GLB,, | Performed by: NURSE PRACTITIONER

## 2025-06-09 PROCEDURE — 93010 ELECTROCARDIOGRAM REPORT: CPT | Mod: S$GLB,,, | Performed by: STUDENT IN AN ORGANIZED HEALTH CARE EDUCATION/TRAINING PROGRAM

## 2025-06-09 PROCEDURE — 1126F AMNT PAIN NOTED NONE PRSNT: CPT | Mod: CPTII,S$GLB,, | Performed by: NURSE PRACTITIONER

## 2025-06-09 PROCEDURE — 3288F FALL RISK ASSESSMENT DOCD: CPT | Mod: CPTII,S$GLB,, | Performed by: NURSE PRACTITIONER

## 2025-06-09 PROCEDURE — 3077F SYST BP >= 140 MM HG: CPT | Mod: CPTII,S$GLB,, | Performed by: NURSE PRACTITIONER

## 2025-06-09 PROCEDURE — 4010F ACE/ARB THERAPY RXD/TAKEN: CPT | Mod: CPTII,S$GLB,, | Performed by: NURSE PRACTITIONER

## 2025-06-09 PROCEDURE — 3078F DIAST BP <80 MM HG: CPT | Mod: CPTII,S$GLB,, | Performed by: NURSE PRACTITIONER

## 2025-06-09 PROCEDURE — 1160F RVW MEDS BY RX/DR IN RCRD: CPT | Mod: CPTII,S$GLB,, | Performed by: NURSE PRACTITIONER

## 2025-06-09 PROCEDURE — 1159F MED LIST DOCD IN RCRD: CPT | Mod: CPTII,S$GLB,, | Performed by: NURSE PRACTITIONER

## 2025-06-09 PROCEDURE — 99214 OFFICE O/P EST MOD 30 MIN: CPT | Mod: S$GLB,,, | Performed by: NURSE PRACTITIONER

## 2025-06-09 PROCEDURE — 99999 PR PBB SHADOW E&M-EST. PATIENT-LVL III: CPT | Mod: PBBFAC,,, | Performed by: NURSE PRACTITIONER

## 2025-06-09 PROCEDURE — 3008F BODY MASS INDEX DOCD: CPT | Mod: CPTII,S$GLB,, | Performed by: NURSE PRACTITIONER

## 2025-06-11 ENCOUNTER — CLINICAL SUPPORT (OUTPATIENT)
Dept: CARDIOLOGY | Facility: HOSPITAL | Age: 70
End: 2025-06-11
Attending: NURSE PRACTITIONER
Payer: MEDICARE

## 2025-06-11 DIAGNOSIS — I48.0 PAROXYSMAL ATRIAL FIBRILLATION: ICD-10-CM

## 2025-07-10 ENCOUNTER — TELEPHONE (OUTPATIENT)
Dept: CARDIOLOGY | Facility: HOSPITAL | Age: 70
End: 2025-07-10
Payer: MEDICARE

## 2025-07-15 ENCOUNTER — RESULTS FOLLOW-UP (OUTPATIENT)
Dept: ELECTROPHYSIOLOGY | Facility: CLINIC | Age: 70
End: 2025-07-15
Payer: MEDICARE

## 2025-07-16 ENCOUNTER — TELEPHONE (OUTPATIENT)
Dept: ELECTROPHYSIOLOGY | Facility: CLINIC | Age: 70
End: 2025-07-16
Payer: MEDICARE

## 2025-07-29 NOTE — PROGRESS NOTES
Ms. Dobbins is a patient of Dr. Antonio and was last seen in clinic 6/9/2025.      Subjective:   Patient ID:  Stefany Dobbins is a 69 y.o. female who presents for follow up of Atrial Fibrillation  .     The patient location is: [place]  The chief complaint leading to consultation is: Atrial fibrillation  Visit type: audio only - technical difficulties in the middle of the visit switched to audio only after 6 min  Face to Face time with patient: 20min total (AV and then AO)  30 minutes of total time spent on the encounter, which includes face to face time and non-face to face time preparing to see the patient (eg, review of tests), Obtaining and/or reviewing separately obtained history, Documenting clinical information in the electronic or other health record, Independently interpreting results (not separately reported) and communicating results to the patient/family/caregiver, or Care coordination (not separately reported).   Each patient to whom he or she provides medical services by telemedicine is:  (1) informed of the relationship between the physician and patient and the respective role of any other health care provider with respect to management of the patient; and (2) notified that he or she may decline to receive medical services by telemedicine and may withdraw from such care at any time.    HPI:    Ms. Dobbins is a 69 y.o. female with atrial fibrillation (PVI/PWI 3/2025), Htn, HFpEF, Nephrolithiasis, Breast cancer (rt-sided lumpectomy + XRT), essential tremor with a telemedicine visit for follow up.    Background:    Primary cardiologist is Dr. Norman.  She is dear friends with Rachel Norman.  Has had paroxysmal symptomatic atrial fibrillation, for roughly 5 years, on Flecainide for roughly 3 years.  Having breakthrough on Flecainide 100 mg BID.    Symptoms are palpitations and feeling poorly. Denies chest pain, shortness of breath.  Notes occasional fatigue, denies syncope or dizziness.  Event  monitor recently obtained. Reveals paroxysmal atrial fibrillation with post conversion pauses of up to 3.4 seconds.    12/27/2024: Paroxysmal atrial fibrillation, with breakthrough on Flecainide. Also having post conversion pauses of up to 3.4 seconds.  Recommend PVI.  Risks and benefits of PVI discussed, she would like to proceed.  PFA.  KRISHNA day of procedure, cancel if in nsr.  Hold Eliquis morning of procedures.  Given the pauses, in interim, switch from Flecainide to Multaq. Hold Multaq 3 days prior.     3/6/2025: Successful pulmonary vein RF ablation. Posterior wall isolation.    6/9/2025: She is 3 mo s/p PVI and PWI. Unfortunately continues to experience symptoms, including palpitations and some LH. Hx of conversion pauses. No syncope. Will update monitor now that she is out of the blanking period. Continue multaq for now. Discussed possibility of redo ablation in the future. Continue eliquis for CVA prophylaxis.      Update (08/06/2025):    Holter 6/11/2025:    The predominant rhythm is sinus rhythm    Paroxysmal atrial fibrillation 9%    Tachy-char syndrome    Today she says she continues to experience palpitations associated with atrial fibrillation. Most recently last Sunday, lasting several hours. When not in AF she feels well. No CP, FLORES. No LH, syncope reported.     She is currently taking eliquis 5mg BID for stroke prophylaxis and denies significant bleeding episodes. She is currently being treated with multaq 400mg BID for rhythm control.  Kidney function is stable, with a creatinine of 0.7 on 2/26/2025.    Relevant Cardiac Test Results:    KRISHNA (3/6/2025):    KRISHNA performed prior to ablation.    Left Ventricle: The left ventricle is normal in size. Normal wall thickness. There is normal systolic function with a visually estimated ejection fraction of 55 - 60%.    Right Ventricle: The right ventricle is normal in size. Wall thickness is normal. Systolic function is normal.    Left Atrium: The left  atrium is dilated. The left atrial appendage appears normal. Appendage velocity is normal at greater than 40 cm/sec. There is no thrombus in the cavity.    Mitral Valve: There is mild regurgitation.    Aorta: Grade 2 atherosclerosis of the descending aorta.    Current Outpatient Medications   Medication Sig    Ca comb no.1/vit D3/B6/FA/B12 (VITAMIN D3, CALCIUM CIT-PHOS, ORAL) Take by mouth.    dronedarone (MULTAQ) 400 mg Tab Take 1 tablet (400 mg total) by mouth 2 (two) times daily with meals.    ELIQUIS 5 mg Tab TAKE 1 TABLET TWICE DAILY    FLUoxetine 20 MG capsule Take 20 mg by mouth Daily.    hydroCHLOROthiazide (HYDRODIURIL) 25 MG tablet Take 1 tablet (25 mg total) by mouth once daily.    losartan (COZAAR) 100 MG tablet TAKE 1 TABLET EVERY DAY    multivitamin (THERAGRAN) per tablet Take 1 tablet by mouth once daily.    pravastatin (PRAVACHOL) 40 MG tablet Take 40 mg by mouth once daily.     acetaminophen (TYLENOL) 325 MG tablet 1 tablet as needed Orally every 4 hrs     No current facility-administered medications for this visit.     Review of Systems   Constitutional: Negative for malaise/fatigue.   Cardiovascular:  Positive for irregular heartbeat and palpitations. Negative for chest pain, dyspnea on exertion and leg swelling.   Respiratory:  Negative for shortness of breath.    Hematologic/Lymphatic: Negative for bleeding problem.   Skin:  Negative for rash.   Musculoskeletal:  Negative for myalgias.   Gastrointestinal:  Negative for hematemesis, hematochezia and nausea.   Genitourinary:  Negative for hematuria.   Neurological:  Negative for light-headedness.   Psychiatric/Behavioral:  Negative for altered mental status.    Allergic/Immunologic: Negative for persistent infections.       Objective:        Ht 5' (1.524 m)   Wt 55.3 kg (122 lb)   BMI 23.83 kg/m²   - N/A telemedicine visit    Physical Exam  - N/A telemedicine visit    Lab Results   Component Value Date     02/26/2025    K 4.1 02/26/2025     MG 1.9 11/18/2022    BUN 25 02/26/2025    CREATININE 0.70 02/26/2025    ALT 11 11/18/2022    AST 19 11/18/2022    HGB 13.2 02/26/2025    HCT 39.1 02/26/2025    TSH 1.42 09/27/2022    LDLCALC 68 09/27/2022       Recent Labs   Lab 02/26/25  1311   INR 1.0       Assessment:     1. Paroxysmal atrial fibrillation    2. Essential hypertension    3. Supraventricular tachycardia    4. Tachy-char syndrome        Plan:     In summary, Ms. Dobbins is a 69 y.o. female with atrial fibrillation (PVI/PWI 3/2025), Htn, HFpEF, Nephrolithiasis, Breast cancer (rt-sided lumpectomy + XRT), essential tremor with a telemedicine follow up.  Patient with paroxysmal AF after ablation despite multaq. Tachy-char syndrome on monitor. She is symptomatic in AF. Case previously reviewed with Dr. Antonio. Recommend redo ablation. Risks, benefits, and alternatives discussed. She agrees to proceed.    Redo PVI  KRISHNA day of procedure, cancel if in nsr.  Hold Multaq 3 days prior.  Hold eliquis AM of procedure  RTC as scheduled    *A copy of this note has been sent to Dr. Antonio*    Follow up as scheduled.      ------------------------------------------------------------------    FREDDY Sesay, NP-C  Cardiac Electrophysiology

## 2025-08-06 ENCOUNTER — OFFICE VISIT (OUTPATIENT)
Dept: ELECTROPHYSIOLOGY | Facility: CLINIC | Age: 70
End: 2025-08-06
Payer: MEDICARE

## 2025-08-06 VITALS — HEIGHT: 60 IN | WEIGHT: 122 LBS | BODY MASS INDEX: 23.95 KG/M2

## 2025-08-06 DIAGNOSIS — I47.10 SUPRAVENTRICULAR TACHYCARDIA: ICD-10-CM

## 2025-08-06 DIAGNOSIS — I48.0 PAROXYSMAL ATRIAL FIBRILLATION: Primary | ICD-10-CM

## 2025-08-06 DIAGNOSIS — I10 ESSENTIAL HYPERTENSION: ICD-10-CM

## 2025-08-06 DIAGNOSIS — I49.5 TACHY-BRADY SYNDROME: ICD-10-CM

## 2025-08-06 RX ORDER — ACETAMINOPHEN 325 MG/1
TABLET ORAL
COMMUNITY

## 2025-08-15 ENCOUNTER — TELEPHONE (OUTPATIENT)
Dept: ELECTROPHYSIOLOGY | Facility: CLINIC | Age: 70
End: 2025-08-15
Payer: MEDICARE

## 2025-08-30 DIAGNOSIS — I10 PRIMARY HYPERTENSION: ICD-10-CM

## 2025-09-02 RX ORDER — LOSARTAN POTASSIUM 100 MG/1
100 TABLET ORAL
Qty: 90 TABLET | Refills: 3 | Status: SHIPPED | OUTPATIENT
Start: 2025-09-02

## (undated) DEVICE — R CATH ACUSON ACUNAV 8FR

## (undated) DEVICE — INTRO FAST-CATH SL1 8.5FR 63CM

## (undated) DEVICE — SHEATH HEMOSTASIS 8.5FR

## (undated) DEVICE — PAD RADI FEMORAL

## (undated) DEVICE — CABLE CONNECT CATH PFA RX HRD

## (undated) DEVICE — NDL TRNSSPTL BRK-1 18GA 71CM

## (undated) DEVICE — CATH ADVISOR VL CIRC MAP BI-D

## (undated) DEVICE — GUIDEWIRE AMPLATZ XSTIFF 180CM

## (undated) DEVICE — GUIDEWIRE ROSEN VAS JTIP 180CM

## (undated) DEVICE — LINE PRESSURE MONITORING 96IN

## (undated) DEVICE — PAD DEFIB CADENCE ADULT R2

## (undated) DEVICE — SHEATH STEERABLE CLEAR

## (undated) DEVICE — COVER SITE-RITE PROBE 96IN

## (undated) DEVICE — INTRODUCER HEMOSTASIS 7.5F

## (undated) DEVICE — R CATH BIDIRECTIONL DF CRV 7FR

## (undated) DEVICE — COVER PRB TRNSDUC 7.6X183CM

## (undated) DEVICE — KIT ENSITE ELECTRODE SURFACE

## (undated) DEVICE — CATH ABLATION PULS FIELD 31MM

## (undated) DEVICE — DILATOR COONS TAPER 14FR